# Patient Record
Sex: FEMALE
[De-identification: names, ages, dates, MRNs, and addresses within clinical notes are randomized per-mention and may not be internally consistent; named-entity substitution may affect disease eponyms.]

---

## 2017-07-21 ENCOUNTER — HOSPITAL (OUTPATIENT)
Dept: OTHER | Age: 52
End: 2017-07-21

## 2018-01-31 ENCOUNTER — HOSPITAL (OUTPATIENT)
Dept: OTHER | Age: 53
End: 2018-01-31
Attending: ANESTHESIOLOGY

## 2018-03-07 ENCOUNTER — HOSPITAL (OUTPATIENT)
Dept: OTHER | Age: 53
End: 2018-03-07
Attending: FAMILY MEDICINE

## 2018-04-03 ENCOUNTER — HOSPITAL (OUTPATIENT)
Dept: OTHER | Age: 53
End: 2018-04-03
Attending: EMERGENCY MEDICINE

## 2018-04-03 LAB
ALBUMIN SERPL-MCNC: 3.3 GM/DL (ref 3.6–5.1)
ALBUMIN/GLOB SERPL: 0.9 {RATIO} (ref 1–2.4)
ALP SERPL-CCNC: 119 UNIT/L (ref 45–117)
ALT SERPL-CCNC: 38 UNIT/L
ANALYZER ANC (IANC): NORMAL
ANION GAP SERPL CALC-SCNC: 8 MMOL/L (ref 10–20)
AST SERPL-CCNC: 29 UNIT/L
BASOPHILS # BLD: 0.1 THOUSAND/MCL (ref 0–0.3)
BASOPHILS NFR BLD: 1 %
BILIRUB SERPL-MCNC: 0.2 MG/DL (ref 0.2–1)
BUN SERPL-MCNC: 11 MG/DL (ref 6–20)
BUN/CREAT SERPL: 12 (ref 7–25)
CALCIUM SERPL-MCNC: 8.8 MG/DL (ref 8.4–10.2)
CHLORIDE: 103 MMOL/L (ref 98–107)
CO2 SERPL-SCNC: 33 MMOL/L (ref 21–32)
CREAT SERPL-MCNC: 0.91 MG/DL (ref 0.51–0.95)
DIFFERENTIAL METHOD BLD: NORMAL
EOSINOPHIL # BLD: 0.2 THOUSAND/MCL (ref 0.1–0.5)
EOSINOPHIL NFR BLD: 3 %
ERYTHROCYTE [DISTWIDTH] IN BLOOD: 12.4 % (ref 11–15)
GLOBULIN SER-MCNC: 3.8 GM/DL (ref 2–4)
GLUCOSE SERPL-MCNC: 103 MG/DL (ref 65–99)
HEMATOCRIT: 39.9 % (ref 36–46.5)
HGB BLD-MCNC: 13.7 GM/DL (ref 12–15.5)
LIPASE SERPL-CCNC: 141 UNIT/L (ref 73–393)
LYMPHOCYTES # BLD: 2.6 THOUSAND/MCL (ref 1–4)
LYMPHOCYTES NFR BLD: 40 %
MAGNESIUM SERPL-MCNC: 2.4 MG/DL (ref 1.7–2.4)
MCH RBC QN AUTO: 30.9 PG (ref 26–34)
MCHC RBC AUTO-ENTMCNC: 34.3 GM/DL (ref 32–36.5)
MCV RBC AUTO: 90.1 FL (ref 78–100)
MONOCYTES # BLD: 0.3 THOUSAND/MCL (ref 0.3–0.9)
MONOCYTES NFR BLD: 4 %
NEUTROPHILS # BLD: 3.5 THOUSAND/MCL (ref 1.8–7.7)
NEUTROPHILS NFR BLD: 52 %
NEUTS SEG NFR BLD: NORMAL %
PERCENT NRBC: NORMAL
PLATELET # BLD: 267 THOUSAND/MCL (ref 140–450)
POTASSIUM SERPL-SCNC: 3.1 MMOL/L (ref 3.4–5.1)
PROT SERPL-MCNC: 7.1 GM/DL (ref 6.4–8.2)
RBC # BLD: 4.43 MILLION/MCL (ref 4–5.2)
SODIUM SERPL-SCNC: 141 MMOL/L (ref 135–145)
WBC # BLD: 6.6 THOUSAND/MCL (ref 4.2–11)

## 2018-05-29 PROCEDURE — 81001 URINALYSIS AUTO W/SCOPE: CPT | Performed by: OBSTETRICS & GYNECOLOGY

## 2018-05-30 ENCOUNTER — HOSPITAL (OUTPATIENT)
Dept: OTHER | Age: 53
End: 2018-05-30
Attending: ANESTHESIOLOGY

## 2018-07-25 PROBLEM — E87.6 HYPOKALEMIA: Status: ACTIVE | Noted: 2018-07-25

## 2018-07-25 PROBLEM — N39.41 URGE INCONTINENCE: Status: ACTIVE | Noted: 2018-07-25

## 2018-07-25 PROBLEM — F33.40 RECURRENT MAJOR DEPRESSIVE DISORDER, IN REMISSION (HCC): Status: ACTIVE | Noted: 2018-07-25

## 2018-07-25 PROBLEM — G47.09 OTHER INSOMNIA: Status: ACTIVE | Noted: 2018-07-25

## 2018-07-25 PROBLEM — M79.7 FIBROMYALGIA: Status: ACTIVE | Noted: 2018-07-25

## 2018-08-13 PROBLEM — Z14.8 CARRIER OF GENE MUTATION FOR HIGH RISK OF CANCER: Status: ACTIVE | Noted: 2018-08-13

## 2018-08-20 PROCEDURE — 88305 TISSUE EXAM BY PATHOLOGIST: CPT | Performed by: RADIOLOGY

## 2018-10-01 ENCOUNTER — TELEPHONE (OUTPATIENT)
Dept: SURGERY | Facility: CLINIC | Age: 53
End: 2018-10-01

## 2018-10-01 NOTE — TELEPHONE ENCOUNTER
I spoke with the patient who called with concerns that she has a joint case scheduled on Monday, 10/15 with Dr. Marce Simons, and that she will be leaving the practice in December-  She would very much like to meet with Dr. Paula Villegas to see if he will be able

## 2018-10-05 ENCOUNTER — OFFICE VISIT (OUTPATIENT)
Dept: SURGERY | Facility: CLINIC | Age: 53
End: 2018-10-05
Payer: MEDICARE

## 2018-10-05 VITALS — HEIGHT: 62 IN | WEIGHT: 205.63 LBS | BODY MASS INDEX: 37.84 KG/M2

## 2018-10-05 DIAGNOSIS — Z14.8 CARRIER OF GENE MUTATION FOR HIGH RISK OF CANCER: Primary | ICD-10-CM

## 2018-10-05 PROCEDURE — 99203 OFFICE O/P NEW LOW 30 MIN: CPT | Performed by: SURGERY

## 2018-10-05 NOTE — CONSULTS
New Patient Consultation    This is the first visit for this 47year old female who presents to discuss reconstructive options following bilateral mastectomy. History of Present Illness:    The patient is a 47year old female who presents with a history Oral Tab TK 1 T PO Q 8 H   PRN FOR PAIN Disp:  Rfl: 2   Zolpidem Tartrate 10 MG Oral Tab TK 1 T PO QD HS Disp:  Rfl: 1         Allergies:      Succinylcholine         OTHER (SEE COMMENTS)    Comment:Malignant hypothermia  Anesthetics, Amide      UNKNOWN  C swelling. Breasts: The patient denies +breast masses, pain, change in the breast skin, skin dimpling, nipple discharge, or rash.     Gastrointestinal:  There is no history of difficulty or pain with swallowing, reflux symptoms, nausea, vomiting, dark/bl without palpable masses. The trachea is in the midline. Conjunctiva are clear, non-icteric. Moderate shoulder grooving is noted.     Breasts: General inspection of the breast shows the left breast to be somewhat larger than the right breast.    Right kortney Given the patient's redundant skin envelope, we discussed performing the mastectomy via a stage Wise pattern or full Wise pattern approach dependent on operative findings.   Representative illustrations and photographs this technique were demonstrated to th on flap-based reconstruction (should it be deemed necessary based on the final pathology results) including flap fibrosis, shrinkage, and fat necrosis.       Finally, we discussed the possible need for revisions as well as the process of nipple areolar sharath

## 2018-10-05 NOTE — PROGRESS NOTES
Surgery and wash instructions verbally reviewed with the patient and written instructions were also provided. The patient understands the need to obtain medical clearance for this procedure and plans to see her PCP for this.  Informed consent for the proced

## 2018-10-05 NOTE — PATIENT INSTRUCTIONS
Surgeon: Dr. Briceño Failing      Tel:  [phone]    Fax: [phone]     Surgery/Procedure: Immediate bilateral breast reconstruction with tissue expander placement and acellular dermal matrix, 2.5 hours, general anesthesia       Hospital:  [facility]

## 2018-10-09 ENCOUNTER — TELEPHONE (OUTPATIENT)
Dept: SURGERY | Facility: CLINIC | Age: 53
End: 2018-10-09

## 2018-10-09 DIAGNOSIS — Z14.8 CARRIER OF GENE MUTATION FOR HIGH RISK OF CANCER: Primary | ICD-10-CM

## 2018-10-09 PROBLEM — Z15.01 BARD1 GENE MUTATION POSITIVE: Status: ACTIVE | Noted: 2018-10-09

## 2018-10-09 PROBLEM — R94.31 ABNORMAL EKG: Status: ACTIVE | Noted: 2018-10-09

## 2018-10-09 PROBLEM — R94.31 QT PROLONGATION: Status: ACTIVE | Noted: 2018-10-09

## 2018-10-09 PROBLEM — I10 ESSENTIAL HYPERTENSION: Status: ACTIVE | Noted: 2018-10-09

## 2018-10-09 RX ORDER — OMEPRAZOLE 10 MG/1
15 CAPSULE, DELAYED RELEASE ORAL 2 TIMES DAILY
Status: ON HOLD | COMMUNITY
End: 2018-10-15

## 2018-10-09 NOTE — TELEPHONE ENCOUNTER
I called Susan Braxton at Dr. Merino Minium office to let her know that I added Dr. Edgardo Blakely reconstruction portion to this case on Monday, 10/15-  She confirmed that the case will indeed begin at 200 as originally requested and not 1110 as currently indicated in

## 2018-10-12 ENCOUNTER — TELEPHONE (OUTPATIENT)
Dept: SURGERY | Facility: CLINIC | Age: 53
End: 2018-10-12

## 2018-10-12 ENCOUNTER — ANESTHESIA EVENT (OUTPATIENT)
Dept: SURGERY | Facility: HOSPITAL | Age: 53
End: 2018-10-12
Payer: MEDICARE

## 2018-10-12 NOTE — TELEPHONE ENCOUNTER
I called Dr. Dunia Jolley office and was only able to leave a voicemail for Richar Bryson-  I would like to discuss this patient's joint procedure on Monday of next week and also that the patient is scheduled for a cardiac cath procedure today at 1500-  I

## 2018-10-12 NOTE — TELEPHONE ENCOUNTER
I spoke with Re Rocha at Dr. Danny Gray office regarding this patient having a cardiac cath procedure this afternoon with subsequent breast surgery scheduled for Monday-  She reports that the cardiologist is not currently making any recommendations to change the cuevas

## 2018-10-12 NOTE — PAT NURSING NOTE
Telephoned Dr. Llanes Saliva office and spoke with Kendall Cahcon and informed her that the patient is having a cardiac cath procedure today at 1500. Unsure if results will be available prior to PAT closing. Requested that their office follow up with patient.

## 2018-10-15 ENCOUNTER — ANESTHESIA (OUTPATIENT)
Dept: SURGERY | Facility: HOSPITAL | Age: 53
End: 2018-10-15
Payer: MEDICARE

## 2018-10-15 ENCOUNTER — HOSPITAL ENCOUNTER (OUTPATIENT)
Facility: HOSPITAL | Age: 53
Discharge: HOME | End: 2018-10-16
Attending: SURGERY | Admitting: SURGERY
Payer: MEDICARE

## 2018-10-15 DIAGNOSIS — Z80.3 FAMILY HISTORY OF BREAST CANCER: ICD-10-CM

## 2018-10-15 DIAGNOSIS — Z15.01 BRCA GENE POSITIVE: ICD-10-CM

## 2018-10-15 DIAGNOSIS — Z15.09 BRCA GENE POSITIVE: ICD-10-CM

## 2018-10-15 PROCEDURE — 0HTV0ZZ RESECTION OF BILATERAL BREAST, OPEN APPROACH: ICD-10-PCS | Performed by: SURGERY

## 2018-10-15 PROCEDURE — 80051 ELECTROLYTE PANEL: CPT

## 2018-10-15 PROCEDURE — 88305 TISSUE EXAM BY PATHOLOGIST: CPT | Performed by: SURGERY

## 2018-10-15 PROCEDURE — 88307 TISSUE EXAM BY PATHOLOGIST: CPT | Performed by: SURGERY

## 2018-10-15 PROCEDURE — 0HHV0NZ INSERTION OF TISSUE EXPANDER INTO BILATERAL BREAST, OPEN APPROACH: ICD-10-PCS | Performed by: SURGERY

## 2018-10-15 DEVICE — IMPLANT TISSUE EXP 133MX-15-T: Type: IMPLANTABLE DEVICE | Site: BREAST | Status: NON-FUNCTIONAL

## 2018-10-15 DEVICE — GRAFT ALLODERM CONTOUR LG PERF: Type: IMPLANTABLE DEVICE | Site: BREAST | Status: FUNCTIONAL

## 2018-10-15 RX ORDER — METOCLOPRAMIDE 10 MG/1
10 TABLET ORAL EVERY 6 HOURS PRN
Status: DISCONTINUED | OUTPATIENT
Start: 2018-10-15 | End: 2018-10-16

## 2018-10-15 RX ORDER — OMEPRAZOLE 20 MG/1
20 CAPSULE, DELAYED RELEASE ORAL NIGHTLY
Status: ON HOLD | COMMUNITY
End: 2020-08-30

## 2018-10-15 RX ORDER — MORPHINE SULFATE 4 MG/ML
2 INJECTION, SOLUTION INTRAMUSCULAR; INTRAVENOUS EVERY 5 MIN PRN
Status: DISCONTINUED | OUTPATIENT
Start: 2018-10-15 | End: 2018-10-15 | Stop reason: HOSPADM

## 2018-10-15 RX ORDER — NALOXONE HYDROCHLORIDE 0.4 MG/ML
80 INJECTION, SOLUTION INTRAMUSCULAR; INTRAVENOUS; SUBCUTANEOUS AS NEEDED
Status: DISCONTINUED | OUTPATIENT
Start: 2018-10-15 | End: 2018-10-15 | Stop reason: HOSPADM

## 2018-10-15 RX ORDER — OXYBUTYNIN CHLORIDE 5 MG/1
5 TABLET ORAL 2 TIMES DAILY
Status: DISCONTINUED | OUTPATIENT
Start: 2018-10-15 | End: 2018-10-16

## 2018-10-15 RX ORDER — TRAMADOL HYDROCHLORIDE 50 MG/1
50 TABLET ORAL EVERY 8 HOURS PRN
Status: ON HOLD | COMMUNITY
End: 2018-10-16

## 2018-10-15 RX ORDER — MIDAZOLAM HYDROCHLORIDE 1 MG/ML
1 INJECTION INTRAMUSCULAR; INTRAVENOUS EVERY 5 MIN PRN
Status: DISCONTINUED | OUTPATIENT
Start: 2018-10-15 | End: 2018-10-15 | Stop reason: HOSPADM

## 2018-10-15 RX ORDER — ONDANSETRON 2 MG/ML
4 INJECTION INTRAMUSCULAR; INTRAVENOUS AS NEEDED
Status: DISCONTINUED | OUTPATIENT
Start: 2018-10-15 | End: 2018-10-15 | Stop reason: HOSPADM

## 2018-10-15 RX ORDER — LOSARTAN POTASSIUM AND HYDROCHLOROTHIAZIDE 12.5; 5 MG/1; MG/1
1 TABLET ORAL DAILY
COMMUNITY
End: 2019-04-30

## 2018-10-15 RX ORDER — PANTOPRAZOLE SODIUM 20 MG/1
20 TABLET, DELAYED RELEASE ORAL
Status: DISCONTINUED | OUTPATIENT
Start: 2018-10-16 | End: 2018-10-16

## 2018-10-15 RX ORDER — SODIUM CHLORIDE, SODIUM LACTATE, POTASSIUM CHLORIDE, CALCIUM CHLORIDE 600; 310; 30; 20 MG/100ML; MG/100ML; MG/100ML; MG/100ML
INJECTION, SOLUTION INTRAVENOUS CONTINUOUS
Status: DISCONTINUED | OUTPATIENT
Start: 2018-10-15 | End: 2018-10-16

## 2018-10-15 RX ORDER — HYDROCODONE BITARTRATE AND ACETAMINOPHEN 5; 325 MG/1; MG/1
2 TABLET ORAL AS NEEDED
Status: DISCONTINUED | OUTPATIENT
Start: 2018-10-15 | End: 2018-10-15 | Stop reason: HOSPADM

## 2018-10-15 RX ORDER — DIPHENHYDRAMINE HYDROCHLORIDE 50 MG/ML
12.5 INJECTION INTRAMUSCULAR; INTRAVENOUS AS NEEDED
Status: DISCONTINUED | OUTPATIENT
Start: 2018-10-15 | End: 2018-10-15 | Stop reason: HOSPADM

## 2018-10-15 RX ORDER — POTASSIUM CHLORIDE 750 MG/1
10 TABLET, EXTENDED RELEASE ORAL NIGHTLY
COMMUNITY
End: 2019-10-15

## 2018-10-15 RX ORDER — ESCITALOPRAM OXALATE 10 MG/1
10 TABLET ORAL DAILY
Status: DISCONTINUED | OUTPATIENT
Start: 2018-10-15 | End: 2018-10-16

## 2018-10-15 RX ORDER — ONDANSETRON 2 MG/ML
4 INJECTION INTRAMUSCULAR; INTRAVENOUS EVERY 6 HOURS PRN
Status: DISCONTINUED | OUTPATIENT
Start: 2018-10-15 | End: 2018-10-16

## 2018-10-15 RX ORDER — CEFAZOLIN SODIUM 1 G/3ML
INJECTION, POWDER, FOR SOLUTION INTRAMUSCULAR; INTRAVENOUS
Status: DISCONTINUED | OUTPATIENT
Start: 2018-10-15 | End: 2018-10-15 | Stop reason: HOSPADM

## 2018-10-15 RX ORDER — HYDROCODONE BITARTRATE AND ACETAMINOPHEN 5; 325 MG/1; MG/1
1-2 TABLET ORAL EVERY 6 HOURS PRN
Status: DISCONTINUED | OUTPATIENT
Start: 2018-10-15 | End: 2018-10-16

## 2018-10-15 RX ORDER — CEFAZOLIN SODIUM/WATER 2 G/20 ML
2 SYRINGE (ML) INTRAVENOUS EVERY 8 HOURS
Status: DISCONTINUED | OUTPATIENT
Start: 2018-10-16 | End: 2018-10-16

## 2018-10-15 RX ORDER — SODIUM CHLORIDE, SODIUM LACTATE, POTASSIUM CHLORIDE, CALCIUM CHLORIDE 600; 310; 30; 20 MG/100ML; MG/100ML; MG/100ML; MG/100ML
INJECTION, SOLUTION INTRAVENOUS CONTINUOUS
Status: DISCONTINUED | OUTPATIENT
Start: 2018-10-15 | End: 2018-10-15 | Stop reason: HOSPADM

## 2018-10-15 RX ORDER — IBUPROFEN 600 MG/1
600 TABLET ORAL EVERY 6 HOURS PRN
Status: DISCONTINUED | OUTPATIENT
Start: 2018-10-15 | End: 2018-10-16

## 2018-10-15 RX ORDER — ONDANSETRON 4 MG/1
4 TABLET, ORALLY DISINTEGRATING ORAL EVERY 6 HOURS PRN
Status: DISCONTINUED | OUTPATIENT
Start: 2018-10-15 | End: 2018-10-16

## 2018-10-15 RX ORDER — ENOXAPARIN SODIUM 100 MG/ML
40 INJECTION SUBCUTANEOUS DAILY
Status: DISCONTINUED | OUTPATIENT
Start: 2018-10-16 | End: 2018-10-16

## 2018-10-15 RX ORDER — HYDROCODONE BITARTRATE AND ACETAMINOPHEN 5; 325 MG/1; MG/1
1 TABLET ORAL AS NEEDED
Status: DISCONTINUED | OUTPATIENT
Start: 2018-10-15 | End: 2018-10-15 | Stop reason: HOSPADM

## 2018-10-15 RX ORDER — ZOLPIDEM TARTRATE 10 MG/1
12.5 TABLET ORAL NIGHTLY
COMMUNITY
End: 2021-08-23 | Stop reason: ALTCHOICE

## 2018-10-15 RX ORDER — DOCUSATE SODIUM 100 MG/1
100 CAPSULE, LIQUID FILLED ORAL 2 TIMES DAILY
Status: DISCONTINUED | OUTPATIENT
Start: 2018-10-16 | End: 2018-10-16

## 2018-10-15 RX ORDER — METOCLOPRAMIDE HYDROCHLORIDE 5 MG/ML
10 INJECTION INTRAMUSCULAR; INTRAVENOUS AS NEEDED
Status: DISCONTINUED | OUTPATIENT
Start: 2018-10-15 | End: 2018-10-15 | Stop reason: HOSPADM

## 2018-10-15 RX ORDER — METOCLOPRAMIDE HYDROCHLORIDE 5 MG/ML
10 INJECTION INTRAMUSCULAR; INTRAVENOUS EVERY 6 HOURS PRN
Status: DISCONTINUED | OUTPATIENT
Start: 2018-10-15 | End: 2018-10-16

## 2018-10-15 RX ORDER — GABAPENTIN 300 MG/1
600 CAPSULE ORAL 2 TIMES DAILY
Status: DISCONTINUED | OUTPATIENT
Start: 2018-10-15 | End: 2018-10-16

## 2018-10-15 RX ORDER — CEFAZOLIN SODIUM/WATER 2 G/20 ML
2 SYRINGE (ML) INTRAVENOUS ONCE
Status: COMPLETED | OUTPATIENT
Start: 2018-10-15 | End: 2018-10-15

## 2018-10-15 RX ORDER — DIPHENHYDRAMINE HYDROCHLORIDE 50 MG/ML
25 INJECTION INTRAMUSCULAR; INTRAVENOUS EVERY 4 HOURS PRN
Status: DISCONTINUED | OUTPATIENT
Start: 2018-10-15 | End: 2018-10-16

## 2018-10-15 RX ORDER — ACETAMINOPHEN 500 MG
1000 TABLET ORAL ONCE
Status: DISCONTINUED | OUTPATIENT
Start: 2018-10-15 | End: 2018-10-15 | Stop reason: HOSPADM

## 2018-10-15 NOTE — PROGRESS NOTES
Plan for bilateral skin-sparing mastectomy by Dr. Carmen Marrero and immediate reconstruction with tissue expander and acellular dermal matrix reviewed with patient and family.  The risks of surgery including but not limited to bleeding, infection, scarring, delayed w

## 2018-10-15 NOTE — ANESTHESIA POSTPROCEDURE EVALUATION
[facility]    [facility] Patient Status:  Outpatient in a Bed   Age/Gender 47year old female MRN [id number]   [facility] SURGERY Attending Cam Hough MD   Hosp Day # 0 PCP Dora Fair MD       Anesthesia Post-op Note    Proc

## 2018-10-15 NOTE — BRIEF OP NOTE
Pre-Operative Diagnosis: BRCA gene positive [Z15.01, Z15.09]  Family history of breast cancer [Z80.3]     Post-Operative Diagnosis: BRCA gene positive [Z15.01, Z15.09]Family history of breast cancer [Z80.3]      Procedure Performed:   BILATERAL SIMPLE MAST

## 2018-10-15 NOTE — ANESTHESIA PREPROCEDURE EVALUATION
PRE-OP EVALUATION    Patient Name: Kenzie Pinedo    Pre-op Diagnosis: BRCA gene positive [Z15.01, Z15.09]  Family history of breast cancer [Z80.3]    Procedure(s):  BILATERAL SIMPLE MASTECTOMY WITH NIPPLE SPARING (PATRICIA MARIN)  Immediate bilateral breast reconstr Pulmonary               (-) shortness of breath     (+) sleep apnea       Neuro/Psych  Comment: Fibromyalgia     (+) depression                         Anxiety    • Arthritis    • Back problem    • Depression    • Esophageal reflux Other findings            ASA: 2   Plan: general  NPO status verified and patient meets guidelines. Post-procedure pain management plan discussed with surgeon and patient.       Plan/risks discussed with: patient, mother and sibling            Pt with

## 2018-10-15 NOTE — HISTORICAL OFFICE NOTE
The above referenced H&P in the office on 9/28/2018 was reviewed by Araceli Hernandez MD on 10/15/2018, the patient was examined and no significant changes have occurred in the patient's condition since the H&P was performed.   Risks and benefits were discusse

## 2018-10-16 VITALS
TEMPERATURE: 98 F | HEART RATE: 87 BPM | HEIGHT: 62 IN | BODY MASS INDEX: 36.6 KG/M2 | RESPIRATION RATE: 18 BRPM | OXYGEN SATURATION: 94 % | WEIGHT: 198.88 LBS | DIASTOLIC BLOOD PRESSURE: 54 MMHG | SYSTOLIC BLOOD PRESSURE: 91 MMHG

## 2018-10-16 PROCEDURE — 85025 COMPLETE CBC W/AUTO DIFF WBC: CPT | Performed by: HOSPITALIST

## 2018-10-16 RX ORDER — DOCUSATE SODIUM 100 MG/1
100 CAPSULE, LIQUID FILLED ORAL 2 TIMES DAILY
Qty: 40 CAPSULE | Refills: 0 | Status: SHIPPED | OUTPATIENT
Start: 2018-10-16 | End: 2018-12-21 | Stop reason: ALTCHOICE

## 2018-10-16 RX ORDER — METOCLOPRAMIDE 10 MG/1
10 TABLET ORAL 3 TIMES DAILY PRN
Qty: 20 TABLET | Refills: 1 | Status: SHIPPED | OUTPATIENT
Start: 2018-10-16 | End: 2019-04-30

## 2018-10-16 RX ORDER — CEPHALEXIN 500 MG/1
500 CAPSULE ORAL 4 TIMES DAILY
Qty: 28 CAPSULE | Refills: 2 | Status: SHIPPED | OUTPATIENT
Start: 2018-10-16 | End: 2018-12-21 | Stop reason: ALTCHOICE

## 2018-10-16 RX ORDER — CEFADROXIL 500 MG/1
500 CAPSULE ORAL 2 TIMES DAILY
Qty: 28 CAPSULE | Refills: 0 | Status: SHIPPED | OUTPATIENT
Start: 2018-10-16 | End: 2018-10-30

## 2018-10-16 RX ORDER — HYDROCODONE BITARTRATE AND ACETAMINOPHEN 5; 325 MG/1; MG/1
1-2 TABLET ORAL EVERY 6 HOURS PRN
Qty: 30 TABLET | Refills: 0 | Status: SHIPPED | OUTPATIENT
Start: 2018-10-16 | End: 2018-12-21 | Stop reason: ALTCHOICE

## 2018-10-16 NOTE — PLAN OF CARE
PAIN - ADULT    • Verbalizes/displays adequate comfort level or patient's stated pain goal Progressing        Patient/Family Goals    • Patient/Family Long Term Goal Progressing    • Patient/Family Short Term Goal Progressing            Pt is A&O, slightly

## 2018-10-16 NOTE — OPERATIVE REPORT
[facility]    PATIENT'S NAME: Lazarus Odonnell   ATTENDING PHYSICIAN: Jose Gonzales M.D. OPERATING PHYSICIAN: Jose Gonzales M.D.    PATIENT ACCOUNT#:   [de-identified]    LOCATION:  [address] [place] [address]  MEDICAL RECORD #:   [id number]       YOB: 1965 subcutaneous tissue, and then the flap was created all the way up to the clavicle superiorly, medially to the sternum, laterally to the latissimus dorsi muscle, and inferiorly to the inframammary ridge, and then the breast was removed.   The fatty tissue wi

## 2018-10-16 NOTE — PROGRESS NOTES
[facility]  Progress Note    Wanna Bloch Patient Status:  Outpatient in a Bed    10/18/1965 MRN [id number]   [facility] 3NW-A Attending Va Curran MD   Hosp Day # 0 PCP Giovanny Patel MD     Subjective:    Patient tolerating P

## 2018-10-16 NOTE — OPERATIVE REPORT
[address]    PATIENT'S NAME: Scott Elder   ATTENDING PHYSICIAN: Hines Lefort, M.D. OPERATING PHYSICIAN: Reg Mcguire M.D.    PATIENT ACCOUNT#:   [de-identified]    LOCATION:  [address] [place] [address]  MEDICAL RECORD #:   [id number]       DATE OF BIRTH:  [birth year]/ periareolar incisions. The patient was then taken to the operating room by Dr. Robyn Ramon team where she underwent bilateral skin-sparing mastectomies.   Once bilateral mastectomy was completed, I entered the room and the plastic surgery portion of the procedur Vicryl sutures. The remaining two in the AlloDerm were secured to the pectoralis muscle medially and superiorly. Laterally, it was secured to the serratus fascia.   Two 15-Ghanaian Fahad drains were exited through lateral stab incisions and sutured to the s hence, the expander was accessed and 120 mL of air were evacuated to a total of 480 mL. Biopatch and Tegaderm were placed on the drain sites. Bacitracin, Xeroform, fluff gauze, surgical bra were applied.   The patient was awakened, extubated, taken to the

## 2018-10-16 NOTE — CONSULTS
[facility] Hospitalist Consult Note     CC:  Medical management sp mastectomy    PCP: Tona Deshpande MD      History of Present Illness: Patient is a 47year old female with PMH sig for HTN, depression/anxiety/fibtromylagia, presents sp mastectomy.        SHe (100 mg total) by mouth 2 (two) times daily.        Social History    Tobacco Use      Smoking status: Never Smoker      Smokeless tobacco: Never Used    Alcohol use: No      Alcohol/week: 0.0 oz      Frequency: Never       Fam Hx  Family History   Problem turgor normal. No rashes or lesions.     Neurologic: Normal strength, no focal deficit appreciated     Data Review:    LABS:   Lab Results   Component Value Date    WBC 11.7 10/16/2018    HGB 11.0 10/16/2018    HCT 33.2 10/16/2018    .0 10/16/2018

## 2018-10-19 ENCOUNTER — OFFICE VISIT (OUTPATIENT)
Dept: SURGERY | Facility: CLINIC | Age: 53
End: 2018-10-19
Payer: MEDICARE

## 2018-10-19 VITALS
SYSTOLIC BLOOD PRESSURE: 99 MMHG | TEMPERATURE: 100 F | DIASTOLIC BLOOD PRESSURE: 63 MMHG | OXYGEN SATURATION: 99 % | HEART RATE: 88 BPM

## 2018-10-19 DIAGNOSIS — Z90.13 ABSENCE OF BREAST, ACQUIRED, BILATERAL: Primary | ICD-10-CM

## 2018-10-19 PROCEDURE — 99024 POSTOP FOLLOW-UP VISIT: CPT | Performed by: SURGERY

## 2018-10-19 NOTE — PROGRESS NOTES
Jaquan Matos is a 46year old female who presents today for a follow-up. She denies fever and chills. She denies nausea, vomiting, diarrhea or constipation.        Physical Examination:   10/19/18  1412   BP: 99/63   Pulse: 88   Temp: 99.6 °F (37.6 °C)   T

## 2018-10-26 ENCOUNTER — OFFICE VISIT (OUTPATIENT)
Dept: SURGERY | Facility: CLINIC | Age: 53
End: 2018-10-26
Payer: MEDICARE

## 2018-10-26 DIAGNOSIS — Z90.13 ABSENCE OF BREAST, ACQUIRED, BILATERAL: ICD-10-CM

## 2018-10-26 DIAGNOSIS — Z90.13 ACQUIRED ABSENCE OF BILATERAL BREASTS AND NIPPLES: Primary | ICD-10-CM

## 2018-10-26 PROCEDURE — 99024 POSTOP FOLLOW-UP VISIT: CPT | Performed by: PHYSICIAN ASSISTANT

## 2018-10-26 RX ORDER — HYDROCODONE BITARTRATE AND ACETAMINOPHEN 5; 325 MG/1; MG/1
1-2 TABLET ORAL EVERY 4 HOURS PRN
Qty: 30 TABLET | Refills: 0 | Status: SHIPPED | OUTPATIENT
Start: 2018-10-26 | End: 2019-06-27

## 2018-10-26 NOTE — PROGRESS NOTES
His is a 47year old female that is POD #11 S/P her bilateral mastectomy (Dr. Vita Leon) and immediate reconstruction with tissue expanders, pre-pec placement, with ADM and 480cc intra-op air fill.  She had a genetic predisposition for breast cancer and chose t will see us back next week for drain evaluation and mastectomy skin evaluation. I did educate her on the signs of hematoma and she understands to call immediately if she were to experience these.  I also encouraged her to contact Dr. Lidya Klein office for a pos

## 2018-10-29 ENCOUNTER — SOCIAL WORK SERVICES (OUTPATIENT)
Dept: HEMATOLOGY/ONCOLOGY | Facility: HOSPITAL | Age: 53
End: 2018-10-29

## 2018-10-29 ENCOUNTER — MEDICAL CORRESPONDENCE (OUTPATIENT)
Dept: SURGERY | Facility: CLINIC | Age: 53
End: 2018-10-29

## 2018-10-29 NOTE — PROGRESS NOTES
[facility] for patient's son Isadora Bowman and faxed to [address] [place] [address], [phone]; copy emailed to Sai Izquierdo. She will hold for patient to  at Dr. Linden Tejeda office, [address] [address]30 in [facility] today.

## 2018-10-29 NOTE — PROGRESS NOTES
Evaluation of Drain output, amounts reflect 24 hour recording period:                    10/26      10/27      10/28        10/29    Drain #1    50cc        65cc          60cc        30cc-am

## 2018-11-02 ENCOUNTER — OFFICE VISIT (OUTPATIENT)
Dept: SURGERY | Facility: CLINIC | Age: 53
End: 2018-11-02
Payer: MEDICARE

## 2018-11-02 DIAGNOSIS — Z79.2 PROPHYLACTIC ANTIBIOTIC: ICD-10-CM

## 2018-11-02 DIAGNOSIS — L90.5 SCAR OF BREAST: Primary | ICD-10-CM

## 2018-11-02 DIAGNOSIS — Z90.13 ABSENCE OF BREAST, ACQUIRED, BILATERAL: ICD-10-CM

## 2018-11-02 PROCEDURE — 99024 POSTOP FOLLOW-UP VISIT: CPT | Performed by: SURGERY

## 2018-11-02 PROCEDURE — 88305 TISSUE EXAM BY PATHOLOGIST: CPT | Performed by: SURGERY

## 2018-11-02 RX ORDER — CEPHALEXIN 500 MG/1
500 CAPSULE ORAL 4 TIMES DAILY
Qty: 28 CAPSULE | Refills: 1 | Status: SHIPPED | OUTPATIENT
Start: 2018-11-02 | End: 2018-11-06

## 2018-11-02 NOTE — PROCEDURES
Debridement of left mastectomy skin flap necrosis. Informed consent was obtained to the patient. The risk, benefits, and alternatives were reviewed. The patient expressed understanding wish to proceed. The area was prepped and draped sterilely.   The cuevas

## 2018-11-02 NOTE — PROGRESS NOTES
Yue Cervantes is a 46year old female who presents today for a follow-up. She denies fever and chills and remains on oral antibiotic prophylaxis. Physical Examination:  Breasts: Breast incisions are noted to have areas of healed epidermal lysis.   The le

## 2018-11-02 NOTE — PROGRESS NOTES
A prescription for oral Keflex electronically sent to the patient's preferred pharmacy per Dr. Chavez Brooks-

## 2018-11-06 ENCOUNTER — OFFICE VISIT (OUTPATIENT)
Dept: SURGERY | Facility: CLINIC | Age: 53
End: 2018-11-06
Payer: MEDICARE

## 2018-11-06 DIAGNOSIS — Z90.13 ABSENCE OF BREAST, ACQUIRED, BILATERAL: Primary | ICD-10-CM

## 2018-11-06 PROCEDURE — 99024 POSTOP FOLLOW-UP VISIT: CPT | Performed by: SURGERY

## 2018-11-06 NOTE — PROGRESS NOTES
Maranda Daniels is a 46year old female who presents today for a follow-up. She denies fever and chills. She denies nausea, vomiting, diarrhea or constipation. Physical Examination:  Breasts: Bilateral breast incisions are clean dry and intact.   Drain e

## 2018-11-09 ENCOUNTER — OFFICE VISIT (OUTPATIENT)
Dept: SURGERY | Facility: CLINIC | Age: 53
End: 2018-11-09
Payer: MEDICARE

## 2018-11-09 DIAGNOSIS — Z90.13 ABSENCE OF BREAST, ACQUIRED, BILATERAL: Primary | ICD-10-CM

## 2018-11-09 PROCEDURE — 99024 POSTOP FOLLOW-UP VISIT: CPT | Performed by: SURGERY

## 2018-11-09 NOTE — PROGRESS NOTES
Raya Nava is a 46year old female who presents today for a follow-up. She denies fever and chills. She denies nausea, vomiting, diarrhea or constipation. Physical Examination:  Breasts: Bilateral breast incisions are clean dry and intact.   There is

## 2018-11-13 ENCOUNTER — OFFICE VISIT (OUTPATIENT)
Dept: SURGERY | Facility: CLINIC | Age: 53
End: 2018-11-13
Payer: MEDICARE

## 2018-11-13 DIAGNOSIS — Z90.13 ABSENCE OF BREAST, ACQUIRED, BILATERAL: Primary | ICD-10-CM

## 2018-11-13 PROCEDURE — 99024 POSTOP FOLLOW-UP VISIT: CPT | Performed by: SURGERY

## 2018-11-13 NOTE — PROGRESS NOTES
Alana Calix is a 46year old female who presents today for a follow-up. She denies fever and chills. She denies nausea, vomiting, diarrhea or constipation. Physical Examination:  Breasts: Bilateral breast incisions are clean dry and intact.   A palp

## 2018-11-23 ENCOUNTER — OFFICE VISIT (OUTPATIENT)
Dept: SURGERY | Facility: CLINIC | Age: 53
End: 2018-11-23
Payer: MEDICARE

## 2018-11-23 DIAGNOSIS — Z90.13 ABSENCE OF BREAST, ACQUIRED, BILATERAL: Primary | ICD-10-CM

## 2018-11-23 PROCEDURE — 99024 POSTOP FOLLOW-UP VISIT: CPT | Performed by: PHYSICIAN ASSISTANT

## 2018-11-23 NOTE — PROGRESS NOTES
This is a 47year old female that is 5.5 weeks S/P her bilateral mastectomy (Dr. Yung Chi) and immediate reconstruction with tissue expanders, pre-pec placement, with ADM and 480cc intra-op air fill.  She had an in office left mastectomy skin flap necrosis alessandro whether or not she wants to undergo the long flap based procedure and longer recovery time. She is going to continue to think about her options and will follow up with us in 1-2 weeks for an additional fill.

## 2018-12-11 ENCOUNTER — OFFICE VISIT (OUTPATIENT)
Dept: SURGERY | Facility: CLINIC | Age: 53
End: 2018-12-11
Payer: MEDICARE

## 2018-12-11 DIAGNOSIS — Z14.8 GENETIC CARRIER STATUS: Primary | ICD-10-CM

## 2018-12-11 DIAGNOSIS — Z90.13 ABSENCE OF BREAST, ACQUIRED, BILATERAL: ICD-10-CM

## 2018-12-11 DIAGNOSIS — Z90.10 ACQUIRED ABSENCE OF BREAST, UNSPECIFIED LATERALITY: ICD-10-CM

## 2018-12-11 PROCEDURE — 99024 POSTOP FOLLOW-UP VISIT: CPT | Performed by: SURGERY

## 2018-12-11 NOTE — PROGRESS NOTES
Alana Calix is a 46year old female who presents today for a follow-up. She denies fever and chills. She denies nausea, vomiting, diarrhea or constipation. Physical Examination:  Breasts: Bilateral breast incisions are clean dry and intact.   Procedur

## 2018-12-12 ENCOUNTER — TELEPHONE (OUTPATIENT)
Dept: SURGERY | Facility: CLINIC | Age: 53
End: 2018-12-12

## 2018-12-17 DIAGNOSIS — Z90.10 ABSENCE OF BREAST, UNSPECIFIED LATERALITY: Primary | ICD-10-CM

## 2018-12-21 ENCOUNTER — OFFICE VISIT (OUTPATIENT)
Dept: SURGERY | Facility: CLINIC | Age: 53
End: 2018-12-21
Payer: MEDICARE

## 2018-12-21 VITALS — RESPIRATION RATE: 18 BRPM | SYSTOLIC BLOOD PRESSURE: 135 MMHG | DIASTOLIC BLOOD PRESSURE: 93 MMHG

## 2018-12-21 DIAGNOSIS — Z90.13 ABSENCE OF BREAST, ACQUIRED, BILATERAL: Primary | ICD-10-CM

## 2018-12-21 PROCEDURE — 99024 POSTOP FOLLOW-UP VISIT: CPT | Performed by: PHYSICIAN ASSISTANT

## 2018-12-21 RX ORDER — POTASSIUM CHLORIDE 750 MG/1
TABLET, FILM COATED, EXTENDED RELEASE ORAL
Refills: 2 | COMMUNITY
Start: 2018-11-09 | End: 2019-02-07

## 2018-12-21 NOTE — PROGRESS NOTES
This is a 47year old female that is 7 weeks S/P her bilateral mastectomy (Dr. Nadir Jennings) and immediate reconstruction with tissue expanders, pre-pec placement, with ADM and 480cc intra-op air fill.  She had an in office left mastectomy skin flap necrosis debri

## 2019-01-08 ENCOUNTER — OFFICE VISIT (OUTPATIENT)
Dept: SURGERY | Facility: CLINIC | Age: 54
End: 2019-01-08
Payer: MEDICARE

## 2019-01-08 DIAGNOSIS — Z14.8 CARRIER OF GENE MUTATION FOR HIGH RISK OF CANCER: ICD-10-CM

## 2019-01-08 DIAGNOSIS — Z90.13 ABSENCE OF BREAST, ACQUIRED, BILATERAL: Primary | ICD-10-CM

## 2019-01-08 PROCEDURE — 99024 POSTOP FOLLOW-UP VISIT: CPT | Performed by: SURGERY

## 2019-01-08 NOTE — PROGRESS NOTES
Teddy Garay is a 46year old female who presents today for a follow-up. She denies fever and chills. Patient is decided that she would like to proceed with flap based reconstruction ultimately.       Physical Examination:  Breasts: Bilateral breast incis

## 2019-01-11 ENCOUNTER — HOSPITAL ENCOUNTER (OUTPATIENT)
Dept: CT IMAGING | Facility: HOSPITAL | Age: 54
Discharge: HOME | End: 2019-01-11
Attending: SURGERY
Payer: MEDICARE

## 2019-01-11 DIAGNOSIS — Z14.8 CARRIER OF GENE MUTATION FOR HIGH RISK OF CANCER: ICD-10-CM

## 2019-01-11 DIAGNOSIS — Z90.13 ABSENCE OF BREAST, ACQUIRED, BILATERAL: ICD-10-CM

## 2019-01-11 LAB — CREAT SERPL-MCNC: 0.9 MG/DL (ref 0.55–1.02)

## 2019-01-11 PROCEDURE — 74174 CTA ABD&PLVS W/CONTRAST: CPT | Performed by: SURGERY

## 2019-01-11 PROCEDURE — 82565 ASSAY OF CREATININE: CPT

## 2019-01-15 ENCOUNTER — TELEPHONE (OUTPATIENT)
Dept: SURGERY | Facility: CLINIC | Age: 54
End: 2019-01-15

## 2019-01-15 NOTE — TELEPHONE ENCOUNTER
I spoke with the patient who called in regards to the results of her CTA abdomen and pelvis-   She will await for the results to be released to [email or web address].    I explained that the testing is used for blood vessel mapping and she may not necessarily be notified

## 2019-01-29 ENCOUNTER — OFFICE VISIT (OUTPATIENT)
Dept: SURGERY | Facility: CLINIC | Age: 54
End: 2019-01-29
Payer: MEDICARE

## 2019-01-29 DIAGNOSIS — Z90.13 ABSENCE OF BREAST, ACQUIRED, BILATERAL: Primary | ICD-10-CM

## 2019-01-29 PROCEDURE — 99212 OFFICE O/P EST SF 10 MIN: CPT | Performed by: SURGERY

## 2019-01-29 NOTE — PROGRESS NOTES
Leonarda Maynard is a 46year old female who presents today for a follow-up. Since her last visit, the patient underwent CT angiogram which showed suitable abdominal perforators. Physical Examination:  Breasts: BilThere is no erythema or seroma noted.   A

## 2019-01-31 ENCOUNTER — TELEPHONE (OUTPATIENT)
Dept: SURGERY | Facility: CLINIC | Age: 54
End: 2019-01-31

## 2019-01-31 NOTE — TELEPHONE ENCOUNTER
S/w pt to hold a date for a procedure with Juan Diaz. Patient would like to hold a date in May. Holding 5/20. Patient aware that this date is tentative.

## 2019-02-07 PROBLEM — E55.9 VITAMIN D DEFICIENCY: Status: ACTIVE | Noted: 2019-02-07

## 2019-02-07 PROBLEM — M17.10 OA (OSTEOARTHRITIS) OF KNEE: Status: ACTIVE | Noted: 2019-02-07

## 2019-02-07 PROBLEM — M17.9 OA (OSTEOARTHRITIS) OF KNEE: Status: ACTIVE | Noted: 2019-02-07

## 2019-04-30 ENCOUNTER — OFFICE VISIT (OUTPATIENT)
Dept: SURGERY | Facility: CLINIC | Age: 54
End: 2019-04-30
Payer: MEDICARE

## 2019-04-30 VITALS — HEIGHT: 62 IN | WEIGHT: 200 LBS | BODY MASS INDEX: 36.8 KG/M2

## 2019-04-30 DIAGNOSIS — Z90.13 ABSENCE OF BREAST, ACQUIRED, BILATERAL: Primary | ICD-10-CM

## 2019-04-30 DIAGNOSIS — Z01.818 PRE-OP TESTING: ICD-10-CM

## 2019-04-30 PROCEDURE — 99212 OFFICE O/P EST SF 10 MIN: CPT | Performed by: SURGERY

## 2019-04-30 RX ORDER — POTASSIUM CHLORIDE 750 MG/1
TABLET, FILM COATED, EXTENDED RELEASE ORAL
Refills: 1 | COMMUNITY
Start: 2019-04-21 | End: 2019-05-09

## 2019-04-30 NOTE — PROGRESS NOTES
Kayley Lee is a 46year old female who presents today for a follow-up. She is without new complaints and would like to proceed with delayed flap based reconstruction in the upcoming weeks.       Physical Examination:   04/30/19  0956   Weight: 90.7 kg (20

## 2019-04-30 NOTE — PROGRESS NOTES
Surgeon: Dr. Holden Dent      Tel:  [phone]    Fax: [phone]     Surgery/Procedure: Bilateral breast reconstruction with abdominal free flap     12 hours, General anesthesia        Hospital:  [facility]: [address] [place] [address]

## 2019-04-30 NOTE — PROGRESS NOTES
Pt given pre-op instructions for surgery. Surgical scrub wash and instructions provided. Surgery to be scheduled on 5/20 at [facility]. Pt instructed to see PCP for medical clearance, pt agreed and understood. Pt state she has already received cardiac

## 2019-05-01 ENCOUNTER — TELEPHONE (OUTPATIENT)
Dept: SURGERY | Facility: CLINIC | Age: 54
End: 2019-05-01

## 2019-05-01 NOTE — TELEPHONE ENCOUNTER
Left message informing patient that there is no OR available on 5/20 at [facility]. Offered to schedule surgery at [facility] on 5/20. Will anticipate a call back.

## 2019-05-01 NOTE — TELEPHONE ENCOUNTER
Patient calling back in regards to surgery. She is ok with having the procedure at [facility] on 5/20. Will f/up with OR schedulers and call back to confirm.

## 2019-05-07 ENCOUNTER — TELEPHONE (OUTPATIENT)
Dept: SURGERY | Facility: CLINIC | Age: 54
End: 2019-05-07

## 2019-05-07 DIAGNOSIS — Z90.13 ABSENCE OF BREAST, ACQUIRED, BILATERAL: Primary | ICD-10-CM

## 2019-05-07 NOTE — TELEPHONE ENCOUNTER
Confirming surgery at [facility] on 5/20. Patient aware of location and estimated start time of surgery.

## 2019-05-08 ENCOUNTER — MEDICAL CORRESPONDENCE (OUTPATIENT)
Dept: SURGERY | Facility: CLINIC | Age: 54
End: 2019-05-08

## 2019-05-08 NOTE — PROGRESS NOTES
Faxed EPIC request for medical clearance to Dr. Arch Sacks office, fax confirmation page received. I will anticipate the clearance.        Also spoke to patient and confirmed that she will be going in for her pre op clearance appointment 5/9/19 @ 3pm.    We s

## 2019-05-09 ENCOUNTER — LAB ENCOUNTER (OUTPATIENT)
Dept: LAB | Facility: HOSPITAL | Age: 54
End: 2019-05-09
Attending: SURGERY
Payer: MEDICARE

## 2019-05-09 DIAGNOSIS — Z01.818 PREOP TESTING: ICD-10-CM

## 2019-05-09 PROBLEM — R94.31 QT PROLONGATION: Status: RESOLVED | Noted: 2018-10-09 | Resolved: 2019-05-09

## 2019-05-09 PROBLEM — R94.31 ABNORMAL EKG: Status: RESOLVED | Noted: 2018-10-09 | Resolved: 2019-05-09

## 2019-05-09 PROCEDURE — 86901 BLOOD TYPING SEROLOGIC RH(D): CPT

## 2019-05-09 PROCEDURE — 86850 RBC ANTIBODY SCREEN: CPT

## 2019-05-09 PROCEDURE — 87641 MR-STAPH DNA AMP PROBE: CPT

## 2019-05-09 PROCEDURE — 86900 BLOOD TYPING SEROLOGIC ABO: CPT

## 2019-05-20 ENCOUNTER — ANESTHESIA EVENT (OUTPATIENT)
Dept: SURGERY | Facility: HOSPITAL | Age: 54
DRG: 585 | End: 2019-05-20
Payer: MEDICARE

## 2019-05-20 ENCOUNTER — ANESTHESIA (OUTPATIENT)
Dept: SURGERY | Facility: HOSPITAL | Age: 54
DRG: 585 | End: 2019-05-20
Payer: MEDICARE

## 2019-05-20 ENCOUNTER — HOSPITAL ENCOUNTER (INPATIENT)
Facility: HOSPITAL | Age: 54
LOS: 3 days | Discharge: HOME | DRG: 585 | End: 2019-05-23
Attending: SURGERY | Admitting: SURGERY
Payer: MEDICARE

## 2019-05-20 DIAGNOSIS — Z01.818 PREOP TESTING: Primary | ICD-10-CM

## 2019-05-20 DIAGNOSIS — Z90.13 ABSENCE OF BREAST, ACQUIRED, BILATERAL: ICD-10-CM

## 2019-05-20 PROCEDURE — 88300 SURGICAL PATH GROSS: CPT | Performed by: SURGERY

## 2019-05-20 PROCEDURE — 0HPT0NZ REMOVAL OF TISSUE EXPANDER FROM RIGHT BREAST, OPEN APPROACH: ICD-10-PCS | Performed by: SURGERY

## 2019-05-20 PROCEDURE — 0HPU0NZ REMOVAL OF TISSUE EXPANDER FROM LEFT BREAST, OPEN APPROACH: ICD-10-PCS | Performed by: SURGERY

## 2019-05-20 PROCEDURE — 88307 TISSUE EXAM BY PATHOLOGIST: CPT | Performed by: SURGERY

## 2019-05-20 PROCEDURE — C9113 INJ PANTOPRAZOLE SODIUM, VIA: HCPCS | Performed by: PHYSICIAN ASSISTANT

## 2019-05-20 PROCEDURE — 0HRV077 REPLACEMENT OF BILATERAL BREAST USING DEEP INFERIOR EPIGASTRIC ARTERY PERFORATOR FLAP, OPEN APPROACH: ICD-10-PCS | Performed by: SURGERY

## 2019-05-20 PROCEDURE — 88305 TISSUE EXAM BY PATHOLOGIST: CPT | Performed by: SURGERY

## 2019-05-20 PROCEDURE — 0HNV0ZZ RELEASE BILATERAL BREAST, OPEN APPROACH: ICD-10-PCS | Performed by: SURGERY

## 2019-05-20 DEVICE — IMPLANTABLE DEVICE: Type: IMPLANTABLE DEVICE | Site: BREAST | Status: FUNCTIONAL

## 2019-05-20 DEVICE — 2.5MM RED COUPLER: Type: IMPLANTABLE DEVICE | Site: BREAST | Status: FUNCTIONAL

## 2019-05-20 RX ORDER — ESCITALOPRAM OXALATE 10 MG/1
40 TABLET ORAL NIGHTLY
Status: DISCONTINUED | OUTPATIENT
Start: 2019-05-20 | End: 2019-05-23

## 2019-05-20 RX ORDER — HYDROCODONE BITARTRATE AND ACETAMINOPHEN 5; 325 MG/1; MG/1
2 TABLET ORAL AS NEEDED
Status: DISCONTINUED | OUTPATIENT
Start: 2019-05-20 | End: 2019-05-21 | Stop reason: HOSPADM

## 2019-05-20 RX ORDER — ASPIRIN 81 MG/1
81 TABLET, CHEWABLE ORAL DAILY
Status: DISCONTINUED | OUTPATIENT
Start: 2019-05-21 | End: 2019-05-23

## 2019-05-20 RX ORDER — ASPIRIN 300 MG
SUPPOSITORY, RECTAL RECTAL AS NEEDED
Status: DISCONTINUED | OUTPATIENT
Start: 2019-05-20 | End: 2019-05-20 | Stop reason: HOSPADM

## 2019-05-20 RX ORDER — ONDANSETRON 4 MG/1
4 TABLET, ORALLY DISINTEGRATING ORAL EVERY 6 HOURS PRN
Status: DISCONTINUED | OUTPATIENT
Start: 2019-05-20 | End: 2019-05-23

## 2019-05-20 RX ORDER — ONDANSETRON 2 MG/ML
INJECTION INTRAMUSCULAR; INTRAVENOUS AS NEEDED
Status: DISCONTINUED | OUTPATIENT
Start: 2019-05-20 | End: 2019-05-20

## 2019-05-20 RX ORDER — MORPHINE SULFATE 4 MG/ML
4 INJECTION, SOLUTION INTRAMUSCULAR; INTRAVENOUS EVERY 10 MIN PRN
Status: DISCONTINUED | OUTPATIENT
Start: 2019-05-20 | End: 2019-05-21 | Stop reason: HOSPADM

## 2019-05-20 RX ORDER — METOCLOPRAMIDE 10 MG/1
10 TABLET ORAL EVERY 6 HOURS PRN
Status: DISCONTINUED | OUTPATIENT
Start: 2019-05-20 | End: 2019-05-23

## 2019-05-20 RX ORDER — CEFAZOLIN SODIUM/WATER 2 G/20 ML
2 SYRINGE (ML) INTRAVENOUS EVERY 8 HOURS
Status: DISCONTINUED | OUTPATIENT
Start: 2019-05-20 | End: 2019-05-20

## 2019-05-20 RX ORDER — ACETAMINOPHEN 325 MG/1
650 TABLET ORAL EVERY 6 HOURS
Status: DISCONTINUED | OUTPATIENT
Start: 2019-05-21 | End: 2019-05-23

## 2019-05-20 RX ORDER — PANTOPRAZOLE SODIUM 40 MG/1
40 TABLET, DELAYED RELEASE ORAL
Status: DISCONTINUED | OUTPATIENT
Start: 2019-05-21 | End: 2019-05-20

## 2019-05-20 RX ORDER — DIPHENHYDRAMINE HCL 25 MG
25 CAPSULE ORAL EVERY 4 HOURS PRN
Status: DISCONTINUED | OUTPATIENT
Start: 2019-05-20 | End: 2019-05-23

## 2019-05-20 RX ORDER — OXYCODONE HYDROCHLORIDE 5 MG/1
10 TABLET ORAL EVERY 4 HOURS PRN
Status: DISCONTINUED | OUTPATIENT
Start: 2019-05-20 | End: 2019-05-21

## 2019-05-20 RX ORDER — ACETAMINOPHEN 10 MG/ML
650 INJECTION, SOLUTION INTRAVENOUS ONCE
Status: DISCONTINUED | OUTPATIENT
Start: 2019-05-20 | End: 2019-05-20

## 2019-05-20 RX ORDER — PROCHLORPERAZINE EDISYLATE 5 MG/ML
5 INJECTION INTRAMUSCULAR; INTRAVENOUS ONCE AS NEEDED
Status: ACTIVE | OUTPATIENT
Start: 2019-05-20 | End: 2019-05-20

## 2019-05-20 RX ORDER — HYDROMORPHONE HYDROCHLORIDE 1 MG/ML
0.5 INJECTION, SOLUTION INTRAMUSCULAR; INTRAVENOUS; SUBCUTANEOUS EVERY 2 HOUR PRN
Status: DISCONTINUED | OUTPATIENT
Start: 2019-05-20 | End: 2019-05-23

## 2019-05-20 RX ORDER — OXYCODONE HYDROCHLORIDE 5 MG/1
5 TABLET ORAL EVERY 4 HOURS PRN
Status: DISCONTINUED | OUTPATIENT
Start: 2019-05-20 | End: 2019-05-21

## 2019-05-20 RX ORDER — GABAPENTIN 600 MG/1
600 TABLET ORAL
Status: DISCONTINUED | OUTPATIENT
Start: 2019-05-20 | End: 2019-05-23

## 2019-05-20 RX ORDER — HYDROMORPHONE HYDROCHLORIDE 1 MG/ML
1 INJECTION, SOLUTION INTRAMUSCULAR; INTRAVENOUS; SUBCUTANEOUS EVERY 2 HOUR PRN
Status: DISCONTINUED | OUTPATIENT
Start: 2019-05-20 | End: 2019-05-23

## 2019-05-20 RX ORDER — KETOROLAC TROMETHAMINE 30 MG/ML
30 INJECTION, SOLUTION INTRAMUSCULAR; INTRAVENOUS EVERY 6 HOURS PRN
Status: DISPENSED | OUTPATIENT
Start: 2019-05-20 | End: 2019-05-22

## 2019-05-20 RX ORDER — METOCLOPRAMIDE HYDROCHLORIDE 5 MG/ML
10 INJECTION INTRAMUSCULAR; INTRAVENOUS EVERY 6 HOURS PRN
Status: DISCONTINUED | OUTPATIENT
Start: 2019-05-20 | End: 2019-05-23

## 2019-05-20 RX ORDER — DOCUSATE SODIUM 100 MG/1
100 CAPSULE, LIQUID FILLED ORAL 2 TIMES DAILY
Status: DISCONTINUED | OUTPATIENT
Start: 2019-05-21 | End: 2019-05-23

## 2019-05-20 RX ORDER — HYDRALAZINE HYDROCHLORIDE 20 MG/ML
INJECTION INTRAMUSCULAR; INTRAVENOUS AS NEEDED
Status: DISCONTINUED | OUTPATIENT
Start: 2019-05-20 | End: 2019-05-20 | Stop reason: SURG

## 2019-05-20 RX ORDER — SODIUM CHLORIDE 9 MG/ML
INJECTION, SOLUTION INTRAVENOUS CONTINUOUS PRN
Status: DISCONTINUED | OUTPATIENT
Start: 2019-05-20 | End: 2019-05-20 | Stop reason: SURG

## 2019-05-20 RX ORDER — CEFAZOLIN SODIUM/WATER 2 G/20 ML
2 SYRINGE (ML) INTRAVENOUS EVERY 8 HOURS
Status: COMPLETED | OUTPATIENT
Start: 2019-05-20 | End: 2019-05-21

## 2019-05-20 RX ORDER — NALOXONE HYDROCHLORIDE 0.4 MG/ML
80 INJECTION, SOLUTION INTRAMUSCULAR; INTRAVENOUS; SUBCUTANEOUS AS NEEDED
Status: ACTIVE | OUTPATIENT
Start: 2019-05-20 | End: 2019-05-20

## 2019-05-20 RX ORDER — OXYCODONE HYDROCHLORIDE 5 MG/1
15 TABLET ORAL EVERY 4 HOURS PRN
Status: DISCONTINUED | OUTPATIENT
Start: 2019-05-20 | End: 2019-05-21

## 2019-05-20 RX ORDER — DEXAMETHASONE SODIUM PHOSPHATE 4 MG/ML
8 VIAL (ML) INJECTION AS NEEDED
Status: DISCONTINUED | OUTPATIENT
Start: 2019-05-20 | End: 2019-05-23

## 2019-05-20 RX ORDER — GABAPENTIN 300 MG/1
600 CAPSULE ORAL 2 TIMES DAILY
Status: DISCONTINUED | OUTPATIENT
Start: 2019-05-20 | End: 2019-05-20

## 2019-05-20 RX ORDER — HALOPERIDOL 5 MG/ML
0.25 INJECTION INTRAMUSCULAR ONCE AS NEEDED
Status: ACTIVE | OUTPATIENT
Start: 2019-05-20 | End: 2019-05-20

## 2019-05-20 RX ORDER — HEPARIN SODIUM 5000 [USP'U]/ML
5000 INJECTION, SOLUTION INTRAVENOUS; SUBCUTANEOUS ONCE
Status: DISCONTINUED | OUTPATIENT
Start: 2019-05-20 | End: 2019-05-20 | Stop reason: HOSPADM

## 2019-05-20 RX ORDER — ACETAMINOPHEN 500 MG
1000 TABLET ORAL ONCE
Status: DISCONTINUED | OUTPATIENT
Start: 2019-05-20 | End: 2019-05-20 | Stop reason: HOSPADM

## 2019-05-20 RX ORDER — MORPHINE SULFATE 2 MG/ML
2 INJECTION, SOLUTION INTRAMUSCULAR; INTRAVENOUS EVERY 10 MIN PRN
Status: DISCONTINUED | OUTPATIENT
Start: 2019-05-20 | End: 2019-05-21 | Stop reason: HOSPADM

## 2019-05-20 RX ORDER — MORPHINE SULFATE 10 MG/ML
6 INJECTION, SOLUTION INTRAMUSCULAR; INTRAVENOUS EVERY 10 MIN PRN
Status: DISCONTINUED | OUTPATIENT
Start: 2019-05-20 | End: 2019-05-21 | Stop reason: HOSPADM

## 2019-05-20 RX ORDER — HYDROMORPHONE HYDROCHLORIDE 1 MG/ML
0.2 INJECTION, SOLUTION INTRAMUSCULAR; INTRAVENOUS; SUBCUTANEOUS EVERY 5 MIN PRN
Status: DISCONTINUED | OUTPATIENT
Start: 2019-05-20 | End: 2019-05-21 | Stop reason: HOSPADM

## 2019-05-20 RX ORDER — HYDROMORPHONE HYDROCHLORIDE 1 MG/ML
0.4 INJECTION, SOLUTION INTRAMUSCULAR; INTRAVENOUS; SUBCUTANEOUS EVERY 5 MIN PRN
Status: DISCONTINUED | OUTPATIENT
Start: 2019-05-20 | End: 2019-05-21 | Stop reason: HOSPADM

## 2019-05-20 RX ORDER — MORPHINE SULFATE 4 MG/ML
4 INJECTION, SOLUTION INTRAMUSCULAR; INTRAVENOUS EVERY 2 HOUR PRN
Status: DISCONTINUED | OUTPATIENT
Start: 2019-05-20 | End: 2019-05-23

## 2019-05-20 RX ORDER — ONDANSETRON 2 MG/ML
4 INJECTION INTRAMUSCULAR; INTRAVENOUS ONCE AS NEEDED
Status: ACTIVE | OUTPATIENT
Start: 2019-05-20 | End: 2019-05-20

## 2019-05-20 RX ORDER — ACETAMINOPHEN 325 MG/1
650 TABLET ORAL EVERY 6 HOURS
Status: DISCONTINUED | OUTPATIENT
Start: 2019-05-20 | End: 2019-05-20

## 2019-05-20 RX ORDER — HYDROMORPHONE HYDROCHLORIDE 1 MG/ML
0.6 INJECTION, SOLUTION INTRAMUSCULAR; INTRAVENOUS; SUBCUTANEOUS EVERY 5 MIN PRN
Status: DISCONTINUED | OUTPATIENT
Start: 2019-05-20 | End: 2019-05-21 | Stop reason: HOSPADM

## 2019-05-20 RX ORDER — ONDANSETRON 2 MG/ML
INJECTION INTRAMUSCULAR; INTRAVENOUS AS NEEDED
Status: DISCONTINUED | OUTPATIENT
Start: 2019-05-20 | End: 2019-05-20 | Stop reason: SURG

## 2019-05-20 RX ORDER — DIPHENHYDRAMINE HYDROCHLORIDE 50 MG/ML
12.5 INJECTION INTRAMUSCULAR; INTRAVENOUS EVERY 4 HOURS PRN
Status: DISCONTINUED | OUTPATIENT
Start: 2019-05-20 | End: 2019-05-23

## 2019-05-20 RX ORDER — DEXAMETHASONE SODIUM PHOSPHATE 4 MG/ML
VIAL (ML) INJECTION AS NEEDED
Status: DISCONTINUED | OUTPATIENT
Start: 2019-05-20 | End: 2019-05-20 | Stop reason: SURG

## 2019-05-20 RX ORDER — SODIUM CHLORIDE, SODIUM LACTATE, POTASSIUM CHLORIDE, CALCIUM CHLORIDE 600; 310; 30; 20 MG/100ML; MG/100ML; MG/100ML; MG/100ML
INJECTION, SOLUTION INTRAVENOUS CONTINUOUS
Status: DISCONTINUED | OUTPATIENT
Start: 2019-05-20 | End: 2019-05-21 | Stop reason: HOSPADM

## 2019-05-20 RX ORDER — ACETAMINOPHEN 10 MG/ML
650 INJECTION, SOLUTION INTRAVENOUS ONCE
Status: COMPLETED | OUTPATIENT
Start: 2019-05-20 | End: 2019-05-20

## 2019-05-20 RX ORDER — HEPARIN SODIUM 5000 [USP'U]/ML
INJECTION, SOLUTION INTRAVENOUS; SUBCUTANEOUS AS NEEDED
Status: DISCONTINUED | OUTPATIENT
Start: 2019-05-20 | End: 2019-05-20 | Stop reason: HOSPADM

## 2019-05-20 RX ORDER — FAMOTIDINE 20 MG/1
20 TABLET ORAL ONCE
Status: COMPLETED | OUTPATIENT
Start: 2019-05-20 | End: 2019-05-20

## 2019-05-20 RX ORDER — HYDROCODONE BITARTRATE AND ACETAMINOPHEN 5; 325 MG/1; MG/1
1 TABLET ORAL AS NEEDED
Status: DISCONTINUED | OUTPATIENT
Start: 2019-05-20 | End: 2019-05-21 | Stop reason: HOSPADM

## 2019-05-20 RX ORDER — OXYBUTYNIN CHLORIDE 5 MG/1
5 TABLET ORAL 2 TIMES DAILY
Status: DISCONTINUED | OUTPATIENT
Start: 2019-05-20 | End: 2019-05-20

## 2019-05-20 RX ORDER — ENOXAPARIN SODIUM 100 MG/ML
40 INJECTION SUBCUTANEOUS DAILY
Status: DISCONTINUED | OUTPATIENT
Start: 2019-05-21 | End: 2019-05-23

## 2019-05-20 RX ORDER — ONDANSETRON 2 MG/ML
4 INJECTION INTRAMUSCULAR; INTRAVENOUS EVERY 6 HOURS PRN
Status: DISCONTINUED | OUTPATIENT
Start: 2019-05-20 | End: 2019-05-23

## 2019-05-20 RX ORDER — LOSARTAN POTASSIUM 25 MG/1
25 TABLET ORAL NIGHTLY
Status: DISCONTINUED | OUTPATIENT
Start: 2019-05-20 | End: 2019-05-20

## 2019-05-20 RX ORDER — CEFAZOLIN SODIUM/WATER 2 G/20 ML
2 SYRINGE (ML) INTRAVENOUS ONCE
Status: COMPLETED | OUTPATIENT
Start: 2019-05-20 | End: 2019-05-20

## 2019-05-20 RX ORDER — ROCURONIUM BROMIDE 10 MG/ML
INJECTION, SOLUTION INTRAVENOUS AS NEEDED
Status: DISCONTINUED | OUTPATIENT
Start: 2019-05-20 | End: 2019-05-20 | Stop reason: SURG

## 2019-05-20 RX ORDER — HYDROMORPHONE HYDROCHLORIDE 1 MG/ML
1.2 INJECTION, SOLUTION INTRAMUSCULAR; INTRAVENOUS; SUBCUTANEOUS EVERY 2 HOUR PRN
Status: DISCONTINUED | OUTPATIENT
Start: 2019-05-20 | End: 2019-05-23

## 2019-05-20 RX ORDER — SODIUM CHLORIDE, SODIUM LACTATE, POTASSIUM CHLORIDE, CALCIUM CHLORIDE 600; 310; 30; 20 MG/100ML; MG/100ML; MG/100ML; MG/100ML
INJECTION, SOLUTION INTRAVENOUS CONTINUOUS
Status: DISCONTINUED | OUTPATIENT
Start: 2019-05-20 | End: 2019-05-23

## 2019-05-20 RX ADMIN — DEXAMETHASONE SODIUM PHOSPHATE 4 MG: 4 MG/ML VIAL (ML) INJECTION at 07:52:00

## 2019-05-20 RX ADMIN — SODIUM CHLORIDE: 9 INJECTION, SOLUTION INTRAVENOUS at 16:47:00

## 2019-05-20 RX ADMIN — CEFAZOLIN SODIUM/WATER 2 G: 2 G/20 ML SYRINGE (ML) INTRAVENOUS at 11:46:00

## 2019-05-20 RX ADMIN — HYDRALAZINE HYDROCHLORIDE 5 MG: 20 INJECTION INTRAMUSCULAR; INTRAVENOUS at 08:38:00

## 2019-05-20 RX ADMIN — CEFAZOLIN SODIUM/WATER 2 G: 2 G/20 ML SYRINGE (ML) INTRAVENOUS at 15:35:00

## 2019-05-20 RX ADMIN — ROCURONIUM BROMIDE 20 MG: 10 INJECTION, SOLUTION INTRAVENOUS at 12:12:00

## 2019-05-20 RX ADMIN — ROCURONIUM BROMIDE 20 MG: 10 INJECTION, SOLUTION INTRAVENOUS at 13:55:00

## 2019-05-20 RX ADMIN — ONDANSETRON 4 MG: 2 INJECTION INTRAMUSCULAR; INTRAVENOUS at 16:23:00

## 2019-05-20 RX ADMIN — CEFAZOLIN SODIUM/WATER 2 G: 2 G/20 ML SYRINGE (ML) INTRAVENOUS at 07:49:00

## 2019-05-20 RX ADMIN — SODIUM CHLORIDE: 9 INJECTION, SOLUTION INTRAVENOUS at 10:22:00

## 2019-05-20 RX ADMIN — ROCURONIUM BROMIDE 10 MG: 10 INJECTION, SOLUTION INTRAVENOUS at 09:03:00

## 2019-05-20 RX ADMIN — SODIUM CHLORIDE, SODIUM LACTATE, POTASSIUM CHLORIDE, CALCIUM CHLORIDE: 600; 310; 30; 20 INJECTION, SOLUTION INTRAVENOUS at 13:23:00

## 2019-05-20 RX ADMIN — ROCURONIUM BROMIDE 20 MG: 10 INJECTION, SOLUTION INTRAVENOUS at 09:50:00

## 2019-05-20 RX ADMIN — SODIUM CHLORIDE, SODIUM LACTATE, POTASSIUM CHLORIDE, CALCIUM CHLORIDE: 600; 310; 30; 20 INJECTION, SOLUTION INTRAVENOUS at 15:41:00

## 2019-05-20 RX ADMIN — ROCURONIUM BROMIDE 50 MG: 10 INJECTION, SOLUTION INTRAVENOUS at 07:36:00

## 2019-05-20 RX ADMIN — SODIUM CHLORIDE, SODIUM LACTATE, POTASSIUM CHLORIDE, CALCIUM CHLORIDE: 600; 310; 30; 20 INJECTION, SOLUTION INTRAVENOUS at 07:30:00

## 2019-05-20 RX ADMIN — ROCURONIUM BROMIDE 10 MG: 10 INJECTION, SOLUTION INTRAVENOUS at 10:58:00

## 2019-05-20 RX ADMIN — SODIUM CHLORIDE: 9 INJECTION, SOLUTION INTRAVENOUS at 07:38:00

## 2019-05-20 NOTE — ANESTHESIA PROCEDURE NOTES
Peripheral IV  Date/Time: 5/20/2019 7:38 AM  Inserted by: Rai Smith CRNA    Placement  Needle size: 18 G  Laterality: left  Location: wrist  Local anesthetic: none  Site prep: alcohol  Technique: anatomical landmarks  Attempts: 1

## 2019-05-20 NOTE — BRIEF OP NOTE
Pre-Operative Diagnosis: Absence of breast, acquired, bilateral [Z90.13]     Post-Operative Diagnosis: Absence of breast, acquired, bilateral [Z90.13]      Procedure Performed:   Procedure(s):  Bilateral breast reconstruction with abdominal free flap    Tello

## 2019-05-20 NOTE — ANESTHESIA PREPROCEDURE EVALUATION
Anesthesia PreOp Note    HPI:     Alana Calix is a 46year old female who presents for preoperative consultation requested by: Jaimie Hernandez MD    Date of Surgery: 5/20/2019    Procedure(s):   MICRO FLAP  Indication: Absence of breast, acquired, bilateral ARTHROSCOPY, ANKLE, SURGICAL; W/ANKLE ARTHRODESIS     • BREAST RECONSTRUCTION Bilateral 10/15/2018    Breast Reconstruction with Bilateral Tissue Expander Placement   • BREAST RECONSTRUCTION/ IMMEDIATE/EXPANDER Bilateral 10/15/2018    Performed by Bharati Mitchell, Rfl: 0 More than a month at Unknown time       Current Facility-Administered Medications Ordered in Epic:  lactated ringers infusion  Intravenous Continuous Kong Eden MD Last Rate: 20 mL/hr at 05/20/19 0626   acetaminophen (TYLENOL EXTRA STRENGTH) tab scars   • Ovarian Cancer Neg      Social History    Socioeconomic History      Marital status:       Spouse name: Not on file      Number of children: 1      Years of education: Not on file      Highest education level: Not on file    Occupational reviewed.      Lab Results   Component Value Date     05/09/2019    K 3.52 05/09/2019    CL 95 (L) 05/09/2019    CO2 32.5 (H) 05/09/2019    BUN 8.0 05/09/2019    CREATSERUM 0.86 05/09/2019    GLU 95 05/09/2019    CA 8.8 05/09/2019          Vital Signs planned.   LENNY VILLEGAS  5/20/2019 7:03 AM

## 2019-05-20 NOTE — H&P
No change in Dr. Brittney Miles H&P from 5/9. We reviewed the plan for bilateral breast reconstruction with abdominal free flap reviewed.  The risks of surgery including but not limited to bleeding, infection, scarring, seroma, asymmetry, partial/total flap loss,

## 2019-05-20 NOTE — H&P
[facility] Hospitalist H&P     CC: breast reconstruction     PCP: Junior Faulkner MD    Admission Date:     ASSESSMENT / PLAN:   Ms. 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 Disp: 30 tablet Rfl: 0   Zolpidem Tartrate 10 MG Oral Tab Take 10 mg by mouth nightly as needed for Sleep.  Disp:  Rfl: non-tender, non-distended, +BS  Breast: bilateral mastectomy, abdominal JUAN drain in place  MSK: moving extremities  Neuro: unable to assess as pt is not currently following commands  Psych: Affect- normal  SKIN: warm, dry  EXT: no edema    Diagnostic Data:

## 2019-05-20 NOTE — ANESTHESIA POSTPROCEDURE EVALUATION
Patient: Francis Pinto    Procedure Summary     Date:  05/20/19 Room / Location:  [facility] OR 06 / [facility] OR    Anesthesia Start:  0730 Anesthesia Stop:      Procedure:  MICRO FLAP (Bilateral Breast) Diagnosis:       Absence of breast, acquired, bilateral

## 2019-05-20 NOTE — ANESTHESIA PROCEDURE NOTES
Airway  Date/Time: 5/20/2019 7:49 AM  Urgency: elective    Airway not difficult    General Information and Staff    Patient location during procedure: OR  Anesthesiologist: Antony Bradford MD  Resident/CRNA: Antonio Manuel CRNA  Performed: CRNA     I

## 2019-05-21 PROCEDURE — C9113 INJ PANTOPRAZOLE SODIUM, VIA: HCPCS | Performed by: PHYSICIAN ASSISTANT

## 2019-05-21 PROCEDURE — 85025 COMPLETE CBC W/AUTO DIFF WBC: CPT | Performed by: INTERNAL MEDICINE

## 2019-05-21 PROCEDURE — 80048 BASIC METABOLIC PNL TOTAL CA: CPT | Performed by: INTERNAL MEDICINE

## 2019-05-21 RX ORDER — SODIUM CHLORIDE, SODIUM LACTATE, POTASSIUM CHLORIDE, CALCIUM CHLORIDE 600; 310; 30; 20 MG/100ML; MG/100ML; MG/100ML; MG/100ML
INJECTION, SOLUTION INTRAVENOUS ONCE
Status: COMPLETED | OUTPATIENT
Start: 2019-05-21 | End: 2019-05-21

## 2019-05-21 RX ORDER — HYDROCODONE BITARTRATE AND ACETAMINOPHEN 5; 325 MG/1; MG/1
1-2 TABLET ORAL EVERY 6 HOURS PRN
Status: DISCONTINUED | OUTPATIENT
Start: 2019-05-21 | End: 2019-05-23

## 2019-05-21 RX ORDER — POTASSIUM CHLORIDE 20 MEQ/1
40 TABLET, EXTENDED RELEASE ORAL EVERY 4 HOURS
Status: COMPLETED | OUTPATIENT
Start: 2019-05-21 | End: 2019-05-21

## 2019-05-21 RX ORDER — MELATONIN
325
Status: DISCONTINUED | OUTPATIENT
Start: 2019-05-21 | End: 2019-05-23

## 2019-05-21 NOTE — PROGRESS NOTES
Pt transferred to room 203 from OR after bilat breast flap. VSS, bilat breast flap w/good pulse ox and signal, strong doppler, abd and umbilical incisions c/d/i, 4 JUAN's w/bloody drainage to bulb suction, mitchell intact.      Pt confused and constantly yellin

## 2019-05-21 NOTE — OPERATIVE REPORT
[facility]    PATIENT'S NAME: Rajat Gonzalesdeb AHUJA   ATTENDING PHYSICIAN: Reg Charles MD   OPERATING PHYSICIAN: Reg Charles MD   PATIENT ACCOUNT#:   [id number]    LOCATION:  [address] [place] [address] RECORD #:   [id number]       DATE OF BIRTH:  [birth year] the operating room, properly identified, and placed in a supine position. Sequential compression devices were placed on bilateral lower extremities. Intravenous antibiotic prophylaxis was administered.   The patient then underwent successful induction of inferior incision was created with a scalpel. The subcutaneous tissues were divided with electrocautery. The superficial inferior epigastric vein was identified, dissected for several centimeters, clipped, and divided.   The umbilicus was then incised wit arterial anastomosis was performed with interrupted 8-0 Prolene suture. Venous and arterial control were released. The anastomoses were noted to be hemostatic and the flap was noted to have normal perfusion.   The flap was then placed in the chest wall po extubated, and taken to the intensive care unit in stable condition. There were no apparent complications. All needle, sponge, and instrument counts were correct at the end of the procedure. Dictated By Shree Cornell MD  d: 05/20/2019 17:08:23  t:

## 2019-05-21 NOTE — PROGRESS NOTES
[facility] Hospitalist Progress Note     Reason for Admission: post op breast reconstruction  PCP: Maggy Araujo MD     Assessment/Plan:     Active Problems:    Preop testing    Ms. Aki Medina is a 46year old female with PMH of anxiety, depression, HTN, GERD, fib oz (90.5 kg)  04/30/19 0956 : 200 lb (90.7 kg)      Exam   GEN: NAD, drowsy  HEENT: EOMI, PERRLA  Neck: Supple, no JVD  Pulm: CTAB, no crackles or wheezes  CV: RRR, no murmurs, 2+ peripheral pulses  ABD: Soft, non-tender, non-distended, +BS  Breast: bilate

## 2019-05-21 NOTE — PROGRESS NOTES
Pain controlled with Toradol.  Received fluid Bolus for oliguria  AVSS  Hgb 9.9  Sleeping->arousable, answers questions  Flaps warm, soft, +doppler, nl cap refill  Abdominal incisions c/d/i  Umbilicus viable  Drain effluent serosanguinous  A/P:  POD #1  Fla

## 2019-05-21 NOTE — PLAN OF CARE
Pt continues to have AMS with improvement post-op, Mds are aware. Pt now follows commands and is now answering questions when asked. Per family, pt has exhibited similar behavior post-op during other various surgery recoveries.  Breast flaps & abd incision opioid side effects  - Notify MD/LIP if interventions unsuccessful or patient reports new pain  - Anticipate increased pain with activity and pre-medicate as appropriate  Outcome: Progressing     Problem: RISK FOR INFECTION - ADULT  Goal: Absence of fever/ hemorrhage  - Monitor temperature, glucose, and sodium.  Initiate appropriate interventions as ordered  Outcome: Progressing     Problem: Impaired Functional Mobility  Goal: Achieve highest/safest level of mobility/gait  Description  Interventions:  - Asses

## 2019-05-21 NOTE — OPERATIVE REPORT
[facility]    PATIENT'S NAME: Marlin AHUJA   ATTENDING PHYSICIAN: Reg Cornell MD   OPERATING PHYSICIAN: Keshia Smith MD   PATIENT ACCOUNT#:   [id number]    LOCATION:  [address] [place] [address] RECORD #:   [id number]       DATE OF BIRTH:  [birth year] area were prepped and draped in the usual sterile fashion.   The procedure was then started with a 2-team approach, where Dr. Karissa Ewing was assisting me during the flap  dissection, as well as the microsurgical anastomosis, and I assisted him during flap was disconnected with medium clips, flushed with heparinized saline, and then transferred to the chest.  The internal mammary artery and vein were clamped and then clipped distally with the medium clip, flushed with heparinized saline, and then the ve

## 2019-05-22 PROCEDURE — 84132 ASSAY OF SERUM POTASSIUM: CPT | Performed by: INTERNAL MEDICINE

## 2019-05-22 PROCEDURE — C9113 INJ PANTOPRAZOLE SODIUM, VIA: HCPCS | Performed by: PHYSICIAN ASSISTANT

## 2019-05-22 RX ORDER — ZOLPIDEM TARTRATE 5 MG/1
10 TABLET ORAL ONCE
Status: COMPLETED | OUTPATIENT
Start: 2019-05-22 | End: 2019-05-22

## 2019-05-22 NOTE — PROGRESS NOTES
Pain controlled.  OOB to chair  AVSS  Awake and alert  Flaps warm, soft, +doppler, nl cap refill  Abdominal incisions c/d/i  Umbilicus viable  Drain effluent serosanguinous  A/P:  POD #2  Flaps well-perfused  Continue monitoring  Ambulated  Possible d/c Fol

## 2019-05-22 NOTE — PLAN OF CARE
Pt in general is progressing well in the post-op period. Reports pain control and declined PRN pain meds. Motivated to be up in the chair and to ambulate to restroom. Roberts d/c'd today. Making adequate urine.      Problem: PAIN - ADULT  Goal: Verbalizes/dis toileting schedule  Outcome: Progressing    Bed in lowest/locked position, call light within reach and pt uses appropriately when she needs assistance to restroom. Verbalized understanding of safety plan. Does not attempt to get up without assistance.

## 2019-05-22 NOTE — PROGRESS NOTES
[facility] Hospitalist Progress Note     Reason for Admission: post op breast reconstruction  PCP: Wanda Fuentes MD     Assessment/Plan:     Active Problems:    Preop testing    Ms. Eli Quiroz is a 46year old female with PMH of anxiety, depression, HTN, GERD, fib 5/22/2019 1125  Gross per 24 hour   Intake 4493 ml   Output 1108 ml   Net 3385 ml       Last 3 Weights  05/20/19 0600 : 202 lb (91.6 kg)  05/09/19 1422 : 200 lb (90.7 kg)  05/09/19 1509 : 199 lb 8 oz (90.5 kg)  04/30/19 0956 : 200 lb (90.7 kg)      Exam

## 2019-05-22 NOTE — PLAN OF CARE
Pt is Aox4 - drowsy, VSS. Bilateral breast flaps are perfusing well, good cap refill, strong pulses. Better UO post bolus. Pt did well transferring from chair to bed. Pain managed with PRNs as needed. Will continue to monitor.      Problem: Patient Centered frequently for physical needs  - Identify cognitive and physical deficits and behaviors that affect risk of falls.   - [employer] fall precautions as indicated by assessment.  - Educate pt/family on patient safety including physical limitations  - Instruct p Cognition  Goal: Patient will exhibit improved attention, thought processing and/or memory  Description  Interventions:  - Minimize distractions in the room when full attention is required  - Allow additional time for processing after asking questions or p

## 2019-05-23 VITALS
RESPIRATION RATE: 14 BRPM | DIASTOLIC BLOOD PRESSURE: 88 MMHG | OXYGEN SATURATION: 94 % | BODY MASS INDEX: 39.26 KG/M2 | WEIGHT: 213.31 LBS | HEART RATE: 94 BPM | SYSTOLIC BLOOD PRESSURE: 123 MMHG | HEIGHT: 62 IN | TEMPERATURE: 99 F

## 2019-05-23 PROCEDURE — 97530 THERAPEUTIC ACTIVITIES: CPT

## 2019-05-23 PROCEDURE — 80048 BASIC METABOLIC PNL TOTAL CA: CPT | Performed by: INTERNAL MEDICINE

## 2019-05-23 PROCEDURE — 97161 PT EVAL LOW COMPLEX 20 MIN: CPT

## 2019-05-23 PROCEDURE — 97166 OT EVAL MOD COMPLEX 45 MIN: CPT

## 2019-05-23 PROCEDURE — 85027 COMPLETE CBC AUTOMATED: CPT | Performed by: INTERNAL MEDICINE

## 2019-05-23 PROCEDURE — 97535 SELF CARE MNGMENT TRAINING: CPT

## 2019-05-23 RX ORDER — MELATONIN
325
Qty: 30 TABLET | Refills: 0 | Status: SHIPPED | COMMUNITY
Start: 2019-05-24

## 2019-05-23 RX ORDER — GABAPENTIN 600 MG/1
1200 TABLET ORAL
Status: DISCONTINUED | OUTPATIENT
Start: 2019-05-23 | End: 2019-05-23

## 2019-05-23 RX ORDER — ENOXAPARIN SODIUM 100 MG/ML
40 INJECTION SUBCUTANEOUS DAILY
Qty: 14 SYRINGE | Refills: 0 | Status: SHIPPED | OUTPATIENT
Start: 2019-05-23 | End: 2019-06-27

## 2019-05-23 RX ORDER — GABAPENTIN 600 MG/1
600 TABLET ORAL
Status: DISCONTINUED | OUTPATIENT
Start: 2019-05-23 | End: 2019-05-23

## 2019-05-23 RX ORDER — DOCUSATE SODIUM 100 MG/1
100 CAPSULE, LIQUID FILLED ORAL 2 TIMES DAILY
Qty: 40 CAPSULE | Refills: 0 | Status: SHIPPED | OUTPATIENT
Start: 2019-05-23 | End: 2019-06-27

## 2019-05-23 RX ORDER — HYDROCODONE BITARTRATE AND ACETAMINOPHEN 5; 325 MG/1; MG/1
1-2 TABLET ORAL EVERY 4 HOURS PRN
Qty: 40 TABLET | Refills: 0 | Status: SHIPPED | OUTPATIENT
Start: 2019-05-23 | End: 2019-06-27

## 2019-05-23 RX ORDER — ONDANSETRON 4 MG/1
4 TABLET, FILM COATED ORAL EVERY 8 HOURS PRN
Qty: 20 TABLET | Refills: 0 | Status: SHIPPED | OUTPATIENT
Start: 2019-05-23 | End: 2019-06-27

## 2019-05-23 RX ORDER — ASPIRIN 81 MG/1
81 TABLET ORAL DAILY
Qty: 14 TABLET | Refills: 0 | Status: ON HOLD | OUTPATIENT
Start: 2019-05-23 | End: 2019-11-14

## 2019-05-23 NOTE — PLAN OF CARE
Problem: Patient Centered Care  Goal: Patient preferences are identified and integrated in the patient's plan of care  Description  Interventions:  - What would you like us to know as we care for you? Per family pt has hx of AMS post-op.    - Provide time physical limitations  - Instruct pt to call for assistance with activity based on assessment  - Modify environment to reduce risk of injury  - Provide assistive devices as appropriate  - Consider OT/PT consult to assist with strengthening/mobility  - Encou

## 2019-05-23 NOTE — CM/SW NOTE
Pt anticipating discharge today. POD #3-bilateral mastectomy now with reconstruction,doing well.     Pt lives with best friend in a home with son, able to do  ADLs has a caregiver 3x/week and cleaning lady, no oxygen needs, ambulates on own, no h/o HHC or

## 2019-05-23 NOTE — PROGRESS NOTES
PRS   POD # 3 s/p BL TIMUR free flap  Pain controlled.  OOB to chair  AVSS  Awake and alert  Flaps warm, soft, +doppler, nl cap refill vioptix 55 and 65%  Abdominal incisions c/d/i  Umbilicus viable  Drain effluent serosanguinous  A/P:  POD #3  Flaps well-

## 2019-05-23 NOTE — PHYSICAL THERAPY NOTE
PHYSICAL THERAPY EVALUATION - INPATIENT     Room Number: 203/203-A  Evaluation Date: 5/23/2019  Type of Evaluation: Initial   Physician Order: PT Eval and Treat    Presenting Problem: bilateral breast reconstruction with abdominal free flap  Reason for not feel she needs HHPT at this time as she has a 24 hour caregiver arranged. DISCHARGE RECOMMENDATIONS  PT Discharge Recommendations: Home;24 hour care/supervision    PLAN  PT Treatment Plan: Bed mobility; Endurance; Energy conservation;Patient educatio HISTORY  10/2018    bilateral simple mastectomies - Dr. Serafin Sol   • [address] [facility]      C3-T1 FUSIONS       HOME SITUATION  Type of Home: House   Home Layout: Two level  Stairs to Enter : 1     Stairs to Bedroom: 12  Railing: Yes    L and from a bed to a chair (including a wheelchair)?: A Little   -   Need to walk in hospital room?: A Little   -   Climbing 3-5 steps with a railing?: A Little     AM-PAC Score:  Raw Score: 18   Approx Degree of Impairment: 46.58%   Standardized Score (AM-

## 2019-05-23 NOTE — PLAN OF CARE
Apparently with confusion immediately post op yesterday and inability to follow commands - did not exhibit any neurological issues this shift.     Problem: NEUROLOGICAL - ADULT  Goal: Achieves stable or improved neurological status  Description  INTERVLAWRENCEO

## 2019-05-23 NOTE — OCCUPATIONAL THERAPY NOTE
OCCUPATIONAL THERAPY EVALUATION - INPATIENT     Room Number: 255/634-E  Evaluation Date: 5/23/2019  Type of Evaluation: Initial  Presenting Problem: (breast reconstruction)    Physician Order: IP Consult to Occupational Therapy  Reason for Therapy: ADL/I RECOMMENDATIONS  OT Discharge Recommendations: Home;24 hour care/supervision  OT Device Recommendations: None    PLAN    Patient has been evaluated and presents with no skilled Occupational Therapy needs  at this time.   Patient will be discharged from [facility] toilet  Shower/Tub and Equipment: Tub-shower combo  Other Equipment: (rollator)    Occupation/Status: (currently in [employer];)  Drives: Yes  Patient Regularly Uses: None    Stairs in Home: 1 stair to enter;2 story home Pt will remain on main level  Use of Ass supervision  Dynamic Standing: supervision/cga w/o ad    FUNCTIONAL ADL ASSESSMENT  Grooming: mod i  Feeding: min a for set up  Bathing: mod malik  Toileting: max a  Upper Extremity Dressing: min a  Lower Extremity Dressing: max a    Education Provided: Antonette valdivia

## 2019-05-23 NOTE — DISCHARGE SUMMARY
[facility] Hospitalist Discharge Summary   Patient ID:  Kenzie Pinedo [id number]  34 year old 10/18/1965    Admit date: 5/20/2019  Discharge date: 5/23/2019  Risk of Readmission Lace+ Score: 18  59-90 High Risk  29-58 Medium Risk  0-28   Low Risk    Primary Care Ph ferrous sulfate 325 (65 FE) MG Tbec  Take 1 tablet (325 mg total) by mouth daily with breakfast.  Start taking on:  5/24/2019     Ondansetron HCl 4 mg tablet  Commonly known as:  ZOFRAN  Take 1 tablet (4 mg total) by mouth every 8 (eight) hours as needed. · Enoxaparin Sodium 40 MG/0.4ML Soln  · HYDROcodone-acetaminophen 5-325 MG Tabs  · Ondansetron HCl 4 mg tablet     Information about where to get these medications is not yet available    Ask your nurse or doctor about these medications  · ferrous sulfate

## 2019-05-27 ENCOUNTER — APPOINTMENT (OUTPATIENT)
Dept: ULTRASOUND IMAGING | Facility: HOSPITAL | Age: 54
End: 2019-05-27
Attending: EMERGENCY MEDICINE
Payer: MEDICARE

## 2019-05-27 ENCOUNTER — HOSPITAL ENCOUNTER (EMERGENCY)
Facility: HOSPITAL | Age: 54
Discharge: HOME | End: 2019-05-27
Attending: EMERGENCY MEDICINE
Payer: MEDICARE

## 2019-05-27 VITALS
TEMPERATURE: 98 F | HEART RATE: 78 BPM | SYSTOLIC BLOOD PRESSURE: 134 MMHG | DIASTOLIC BLOOD PRESSURE: 82 MMHG | OXYGEN SATURATION: 96 % | RESPIRATION RATE: 16 BRPM

## 2019-05-27 DIAGNOSIS — R60.9 EDEMA, UNSPECIFIED TYPE: Primary | ICD-10-CM

## 2019-05-27 PROCEDURE — 93971 EXTREMITY STUDY: CPT | Performed by: EMERGENCY MEDICINE

## 2019-05-27 PROCEDURE — 99284 EMERGENCY DEPT VISIT MOD MDM: CPT

## 2019-05-27 NOTE — ED PROVIDER NOTES
Patient Seen in: [facility] AND [facility] Emergency Department    History   Patient presents with:  Swelling Edema (cardiovascular, metabolic)    Stated Complaint:     HPI    48-year-old female who is relatively healthy who recently she got a hospital for bilat SURGERY PROCEDURE UNLISTED      C3-T1 FUSIONS           Social History    Tobacco Use      Smoking status: Never Smoker      Smokeless tobacco: Never Used    Alcohol use: No      Alcohol/week: 0.0 oz      Frequency: Never    Drug use: No      Review of Sys TECHNIQUE: Color duplex Doppler venous ultrasound of the left lower extremity was performed in the usual manner. FINDINGS: The femoral and popliteal veins appear normal.  Normal flow was demonstrated with color and pulsed Doppler.   Visualized portions of

## 2019-05-27 NOTE — ED INITIAL ASSESSMENT (HPI)
Pt here for swelling to left foot which began yesterday. Denies CP or SOB.  Pt is 1 week post op laura flap surgery

## 2019-05-28 DIAGNOSIS — R11.0 NAUSEA: Primary | ICD-10-CM

## 2019-05-28 RX ORDER — METOCLOPRAMIDE 10 MG/1
10 TABLET ORAL EVERY 8 HOURS PRN
Qty: 20 TABLET | Refills: 0 | Status: SHIPPED | OUTPATIENT
Start: 2019-05-28 | End: 2019-12-13 | Stop reason: ALTCHOICE

## 2019-05-31 ENCOUNTER — OFFICE VISIT (OUTPATIENT)
Dept: SURGERY | Facility: CLINIC | Age: 54
End: 2019-05-31
Payer: MEDICARE

## 2019-05-31 DIAGNOSIS — Z90.13 ABSENCE OF BREAST, ACQUIRED, BILATERAL: Primary | ICD-10-CM

## 2019-05-31 PROCEDURE — 99024 POSTOP FOLLOW-UP VISIT: CPT | Performed by: PHYSICIAN ASSISTANT

## 2019-05-31 NOTE — PROGRESS NOTES
This is a 53-year-old female that is 11 days status post her bilateral breast reconstruction with TIMUR flap. She is here today for wound check, flap check, and drain evaluation. She denies any fevers or signs of infection.   He has been compliant with no serosanguinous drainage following removal.

## 2019-06-05 ENCOUNTER — SOCIAL WORK SERVICES (OUTPATIENT)
Dept: HEMATOLOGY/ONCOLOGY | Facility: HOSPITAL | Age: 54
End: 2019-06-05

## 2019-06-05 NOTE — PROGRESS NOTES
SUZIE completed patients son [place] paperwork and faxed to [employer] and disability team at fax # [phone]. SUZIE also put the paperwork in the locked drawer at [facility] in [facility]. Patient will  on Friday.

## 2019-06-07 ENCOUNTER — SOCIAL WORK SERVICES (OUTPATIENT)
Dept: HEMATOLOGY/ONCOLOGY | Facility: HOSPITAL | Age: 54
End: 2019-06-07

## 2019-06-07 ENCOUNTER — OFFICE VISIT (OUTPATIENT)
Dept: SURGERY | Facility: CLINIC | Age: 54
End: 2019-06-07
Payer: MEDICARE

## 2019-06-07 DIAGNOSIS — Z90.13 ABSENCE OF BREAST, ACQUIRED, BILATERAL: Primary | ICD-10-CM

## 2019-06-07 PROCEDURE — 99024 POSTOP FOLLOW-UP VISIT: CPT | Performed by: SURGERY

## 2019-06-07 NOTE — PROGRESS NOTES
Maranda Daniels is a 46year old female who presents today for a follow-up. She denies fever and chills. She denies nausea, vomiting, diarrhea or constipation. Physical Examination:  Breasts: Lateral breast flaps are warm and soft.   Incisions are clean

## 2019-06-07 NOTE — PROGRESS NOTES
Sw made revisions to [facility] paperwork for patients son, Bishop Brown at request of patient. Patient was provided with a copy and revisions were faxed to [phone].

## 2019-06-14 ENCOUNTER — TELEPHONE (OUTPATIENT)
Dept: SURGERY | Facility: CLINIC | Age: 54
End: 2019-06-14

## 2019-06-14 ENCOUNTER — OFFICE VISIT (OUTPATIENT)
Dept: SURGERY | Facility: CLINIC | Age: 54
End: 2019-06-14
Payer: MEDICARE

## 2019-06-14 DIAGNOSIS — Z90.13 ABSENCE OF BREAST, ACQUIRED, BILATERAL: Primary | ICD-10-CM

## 2019-06-14 PROCEDURE — 99024 POSTOP FOLLOW-UP VISIT: CPT | Performed by: PHYSICIAN ASSISTANT

## 2019-06-14 NOTE — PROGRESS NOTES
This is a 70-year-old female that is 3.5 weeks status post her bilateral breast reconstruction with TIMUR flap. She is here today for wound check, flap check, and drain evaluation. She denies any fevers or signs of infection.   She has been compliant with movement she will try an OTC enema. Her abdominal pain is somewhat relieved already after taking the stool softener. I explained that if this worsened despite a bowel movement, she should present to the ER. We will see her back in 3 weeks.  Currently she fe

## 2019-06-14 NOTE — TELEPHONE ENCOUNTER
Pt called on 6/13 afternoon c/o upper abdominal discomfort since am, reflux, constipation x 4 days, no fevers, on abdominal erythema. Pt counseled that likely related to constipation.  Recommended dulcolax, OTC enema and told to present to ED if no improvem

## 2019-06-19 ENCOUNTER — TELEPHONE (OUTPATIENT)
Dept: SURGERY | Facility: CLINIC | Age: 54
End: 2019-06-19

## 2019-07-05 PROCEDURE — 36415 COLL VENOUS BLD VENIPUNCTURE: CPT | Performed by: INTERNAL MEDICINE

## 2019-07-05 PROCEDURE — 84080 ASSAY ALKALINE PHOSPHATASES: CPT | Performed by: INTERNAL MEDICINE

## 2019-07-05 PROCEDURE — 84075 ASSAY ALKALINE PHOSPHATASE: CPT | Performed by: INTERNAL MEDICINE

## 2019-07-09 ENCOUNTER — OFFICE VISIT (OUTPATIENT)
Dept: SURGERY | Facility: CLINIC | Age: 54
End: 2019-07-09
Payer: MEDICARE

## 2019-07-09 DIAGNOSIS — Z90.13 ABSENCE OF BREAST, ACQUIRED, BILATERAL: ICD-10-CM

## 2019-07-09 DIAGNOSIS — S30.1XXA ABDOMINAL WALL SEROMA, INITIAL ENCOUNTER: Primary | ICD-10-CM

## 2019-07-09 PROCEDURE — 83516 IMMUNOASSAY NONANTIBODY: CPT | Performed by: INTERNAL MEDICINE

## 2019-07-09 PROCEDURE — 82390 ASSAY OF CERULOPLASMIN: CPT | Performed by: INTERNAL MEDICINE

## 2019-07-09 PROCEDURE — 99024 POSTOP FOLLOW-UP VISIT: CPT | Performed by: SURGERY

## 2019-07-09 PROCEDURE — 86706 HEP B SURFACE ANTIBODY: CPT | Performed by: INTERNAL MEDICINE

## 2019-07-09 PROCEDURE — 82103 ALPHA-1-ANTITRYPSIN TOTAL: CPT | Performed by: INTERNAL MEDICINE

## 2019-07-09 PROCEDURE — 86803 HEPATITIS C AB TEST: CPT | Performed by: INTERNAL MEDICINE

## 2019-07-09 NOTE — PROGRESS NOTES
Joyce Adan is a 46year old female who presents today for a follow-up. She reports that her bowel movements have normalized after she began a regimen of laxatives as per her PCP.   The patient also underwent an abdominal CT scan which showed a abdominal

## 2019-07-12 ENCOUNTER — HOSPITAL ENCOUNTER (OUTPATIENT)
Dept: ULTRASOUND IMAGING | Facility: HOSPITAL | Age: 54
Discharge: HOME | End: 2019-07-12
Attending: SURGERY
Payer: MEDICARE

## 2019-07-12 ENCOUNTER — APPOINTMENT (OUTPATIENT)
Dept: LAB | Facility: HOSPITAL | Age: 54
End: 2019-07-12
Attending: SURGERY
Payer: MEDICARE

## 2019-07-12 VITALS
TEMPERATURE: 97 F | RESPIRATION RATE: 14 BRPM | HEART RATE: 67 BPM | OXYGEN SATURATION: 95 % | SYSTOLIC BLOOD PRESSURE: 110 MMHG | DIASTOLIC BLOOD PRESSURE: 78 MMHG

## 2019-07-12 DIAGNOSIS — S30.1XXA ABDOMINAL WALL SEROMA, INITIAL ENCOUNTER: Primary | ICD-10-CM

## 2019-07-12 DIAGNOSIS — S30.1XXA ABDOMINAL WALL SEROMA, INITIAL ENCOUNTER: ICD-10-CM

## 2019-07-12 LAB
INR BLD: 0.87 (ref 0.9–1.1)
PSA SERPL DL<=0.01 NG/ML-MCNC: 12.2 SECONDS (ref 12.5–14.7)

## 2019-07-12 PROCEDURE — 36415 COLL VENOUS BLD VENIPUNCTURE: CPT

## 2019-07-12 PROCEDURE — 85610 PROTHROMBIN TIME: CPT

## 2019-07-12 PROCEDURE — 10030 IMG GID FLU COLL DRG SFT TIS: CPT | Performed by: SURGERY

## 2019-07-12 NOTE — PROCEDURES
[facility]  Procedure Note    Barb Prado Patient Status:  Outpatient    10/18/1965 MRN [id number]   Location [address] [place] [address] Attending Ever Florez MD   Hosp Day # 0 PCP Golden Alvarado MD     Procedure: us guided drainage abdominal

## 2019-07-15 ENCOUNTER — OFFICE VISIT (OUTPATIENT)
Dept: SURGERY | Facility: CLINIC | Age: 54
End: 2019-07-15
Payer: MEDICARE

## 2019-07-15 DIAGNOSIS — Z90.13 ABSENCE OF BREAST, ACQUIRED, BILATERAL: Primary | ICD-10-CM

## 2019-07-15 PROCEDURE — 99024 POSTOP FOLLOW-UP VISIT: CPT | Performed by: PHYSICIAN ASSISTANT

## 2019-07-22 ENCOUNTER — OFFICE VISIT (OUTPATIENT)
Dept: SURGERY | Facility: CLINIC | Age: 54
End: 2019-07-22
Payer: MEDICARE

## 2019-07-22 VITALS — TEMPERATURE: 99 F | RESPIRATION RATE: 16 BRPM

## 2019-07-22 DIAGNOSIS — Z90.13 ABSENCE OF BREAST, ACQUIRED, BILATERAL: Primary | ICD-10-CM

## 2019-07-22 PROCEDURE — 99024 POSTOP FOLLOW-UP VISIT: CPT | Performed by: PHYSICIAN ASSISTANT

## 2019-07-22 NOTE — PROGRESS NOTES
This is a 58-year-old female that is 9 weeks status post her bilateral breast reconstruction with TIMUR flap.  She is here today for wound check, flap check, and drain evaluation.  She denies any fevers or signs of infection.  She has been compliant with no

## 2019-07-25 ENCOUNTER — APPOINTMENT (OUTPATIENT)
Dept: CT IMAGING | Facility: HOSPITAL | Age: 54
End: 2019-07-25
Attending: EMERGENCY MEDICINE
Payer: MEDICARE

## 2019-07-25 ENCOUNTER — TELEPHONE (OUTPATIENT)
Dept: SURGERY | Facility: CLINIC | Age: 54
End: 2019-07-25

## 2019-07-25 ENCOUNTER — HOSPITAL ENCOUNTER (EMERGENCY)
Facility: HOSPITAL | Age: 54
Discharge: HOME | End: 2019-07-26
Attending: EMERGENCY MEDICINE
Payer: MEDICARE

## 2019-07-25 DIAGNOSIS — S30.1XXA ABDOMINAL WALL SEROMA, INITIAL ENCOUNTER: ICD-10-CM

## 2019-07-25 DIAGNOSIS — R10.9 ABDOMINAL PAIN OF UNKNOWN ETIOLOGY: Primary | ICD-10-CM

## 2019-07-25 LAB
ALBUMIN SERPL-MCNC: 3 G/DL (ref 3.4–5)
ALP LIVER SERPL-CCNC: 162 U/L (ref 41–108)
ALT SERPL-CCNC: 38 U/L (ref 13–56)
ANION GAP SERPL CALC-SCNC: 6 MMOL/L (ref 0–18)
APTT PPP: 28.3 SECONDS (ref 23.2–35.3)
AST SERPL-CCNC: 38 U/L (ref 15–37)
BASOPHILS # BLD AUTO: 0.06 X10(3) UL (ref 0–0.2)
BASOPHILS NFR BLD AUTO: 0.7 %
BILIRUB DIRECT SERPL-MCNC: <0.1 MG/DL (ref 0–0.2)
BILIRUB SERPL-MCNC: 0.2 MG/DL (ref 0.1–2)
BILIRUB UR QL: NEGATIVE
BUN BLD-MCNC: 10 MG/DL (ref 7–18)
BUN/CREAT SERPL: 12.7 (ref 10–20)
CALCIUM BLD-MCNC: 8.6 MG/DL (ref 8.5–10.1)
CHLORIDE SERPL-SCNC: 101 MMOL/L (ref 98–112)
CLARITY UR: CLEAR
CO2 SERPL-SCNC: 32 MMOL/L (ref 21–32)
COLOR UR: YELLOW
CREAT BLD-MCNC: 0.79 MG/DL (ref 0.55–1.02)
DEPRECATED RDW RBC AUTO: 45.5 FL (ref 35.1–46.3)
EOSINOPHIL # BLD AUTO: 0.21 X10(3) UL (ref 0–0.7)
EOSINOPHIL NFR BLD AUTO: 2.5 %
ERYTHROCYTE [DISTWIDTH] IN BLOOD BY AUTOMATED COUNT: 13.9 % (ref 11–15)
GLUCOSE BLD-MCNC: 103 MG/DL (ref 70–99)
GLUCOSE UR-MCNC: NEGATIVE MG/DL
HCT VFR BLD AUTO: 36 % (ref 35–48)
HGB BLD-MCNC: 11.6 G/DL (ref 12–16)
IMM GRANULOCYTES # BLD AUTO: 0.03 X10(3) UL (ref 0–1)
IMM GRANULOCYTES NFR BLD: 0.4 %
INR BLD: 0.94 (ref 0.9–1.2)
KETONES UR-MCNC: NEGATIVE MG/DL
LIPASE SERPL-CCNC: 142 U/L (ref 73–393)
LYMPHOCYTES # BLD AUTO: 2.2 X10(3) UL (ref 1–4)
LYMPHOCYTES NFR BLD AUTO: 26.7 %
M PROTEIN MFR SERPL ELPH: 6.9 G/DL (ref 6.4–8.2)
MCH RBC QN AUTO: 28.9 PG (ref 26–34)
MCHC RBC AUTO-ENTMCNC: 32.2 G/DL (ref 31–37)
MCV RBC AUTO: 89.8 FL (ref 80–100)
MONOCYTES # BLD AUTO: 0.56 X10(3) UL (ref 0.1–1)
MONOCYTES NFR BLD AUTO: 6.8 %
NEUTROPHILS # BLD AUTO: 5.19 X10 (3) UL (ref 1.5–7.7)
NEUTROPHILS # BLD AUTO: 5.19 X10(3) UL (ref 1.5–7.7)
NEUTROPHILS NFR BLD AUTO: 62.9 %
NITRITE UR QL STRIP.AUTO: NEGATIVE
OSMOLALITY SERPL CALC.SUM OF ELEC: 287 MOSM/KG (ref 275–295)
PH UR: 6 [PH] (ref 5–8)
PLATELET # BLD AUTO: 292 10(3)UL (ref 150–450)
POTASSIUM SERPL-SCNC: 3.2 MMOL/L (ref 3.5–5.1)
PROT UR-MCNC: NEGATIVE MG/DL
PROTHROMBIN TIME: 12.4 SECONDS (ref 11.8–14.5)
RBC # BLD AUTO: 4.01 X10(6)UL (ref 3.8–5.3)
RBC #/AREA URNS AUTO: 8 /HPF
SODIUM SERPL-SCNC: 139 MMOL/L (ref 136–145)
SP GR UR STRIP: >1.04 (ref 1–1.03)
UROBILINOGEN UR STRIP-ACNC: <2
VIT C UR-MCNC: NEGATIVE MG/DL
WBC # BLD AUTO: 8.3 X10(3) UL (ref 4–11)
WBC #/AREA URNS AUTO: 9 /HPF

## 2019-07-25 PROCEDURE — 74177 CT ABD & PELVIS W/CONTRAST: CPT | Performed by: EMERGENCY MEDICINE

## 2019-07-25 PROCEDURE — 80048 BASIC METABOLIC PNL TOTAL CA: CPT | Performed by: EMERGENCY MEDICINE

## 2019-07-25 PROCEDURE — 85025 COMPLETE CBC W/AUTO DIFF WBC: CPT | Performed by: EMERGENCY MEDICINE

## 2019-07-25 PROCEDURE — 87086 URINE CULTURE/COLONY COUNT: CPT | Performed by: EMERGENCY MEDICINE

## 2019-07-25 PROCEDURE — 85730 THROMBOPLASTIN TIME PARTIAL: CPT | Performed by: EMERGENCY MEDICINE

## 2019-07-25 PROCEDURE — 99284 EMERGENCY DEPT VISIT MOD MDM: CPT

## 2019-07-25 PROCEDURE — 83690 ASSAY OF LIPASE: CPT | Performed by: EMERGENCY MEDICINE

## 2019-07-25 PROCEDURE — 80076 HEPATIC FUNCTION PANEL: CPT | Performed by: EMERGENCY MEDICINE

## 2019-07-25 PROCEDURE — 81001 URINALYSIS AUTO W/SCOPE: CPT | Performed by: EMERGENCY MEDICINE

## 2019-07-25 PROCEDURE — 36415 COLL VENOUS BLD VENIPUNCTURE: CPT

## 2019-07-25 PROCEDURE — 85610 PROTHROMBIN TIME: CPT | Performed by: EMERGENCY MEDICINE

## 2019-07-25 NOTE — TELEPHONE ENCOUNTER
Pt notified per Dr. Hancock List, based on her symptoms she will need to be evaluated in the ED. Pt agreed and will come to [facility] AND [facility] ED for evaluation.

## 2019-07-26 ENCOUNTER — HOSPITAL ENCOUNTER (OUTPATIENT)
Dept: INTERVENTIONAL RADIOLOGY/VASCULAR | Facility: HOSPITAL | Age: 54
Discharge: HOME | End: 2019-07-26
Attending: RADIOLOGY | Admitting: RADIOLOGY
Payer: MEDICARE

## 2019-07-26 VITALS
DIASTOLIC BLOOD PRESSURE: 92 MMHG | TEMPERATURE: 98 F | RESPIRATION RATE: 20 BRPM | BODY MASS INDEX: 35 KG/M2 | OXYGEN SATURATION: 97 % | HEART RATE: 70 BPM | WEIGHT: 195 LBS | SYSTOLIC BLOOD PRESSURE: 156 MMHG

## 2019-07-26 VITALS — DIASTOLIC BLOOD PRESSURE: 88 MMHG | SYSTOLIC BLOOD PRESSURE: 136 MMHG

## 2019-07-26 DIAGNOSIS — S30.1XXD ABDOMINAL WALL SEROMA, SUBSEQUENT ENCOUNTER: Primary | ICD-10-CM

## 2019-07-26 DIAGNOSIS — S30.1XXD ABDOMINAL WALL SEROMA, SUBSEQUENT ENCOUNTER: ICD-10-CM

## 2019-07-26 PROCEDURE — 0H97X0Z DRAINAGE OF ABDOMEN SKIN WITH DRAINAGE DEVICE, EXTERNAL APPROACH: ICD-10-PCS | Performed by: RADIOLOGY

## 2019-07-26 PROCEDURE — 10030 IMG GID FLU COLL DRG SFT TIS: CPT

## 2019-07-26 RX ORDER — LIDOCAINE HYDROCHLORIDE 20 MG/ML
INJECTION, SOLUTION EPIDURAL; INFILTRATION; INTRACAUDAL; PERINEURAL
Status: COMPLETED
Start: 2019-07-26 | End: 2019-07-26

## 2019-07-26 RX ORDER — SODIUM CHLORIDE 9 MG/ML
INJECTION, SOLUTION INTRAVENOUS
Status: COMPLETED
Start: 2019-07-26 | End: 2019-07-26

## 2019-07-26 NOTE — INTERVAL H&P NOTE
The above referenced H&P was reviewed by Jeff Sherwood MD on 7/26/2019, the patient was examined and no significant changes have occurred in the patient's condition since the H&P was performed.   Risks, benefits, alternative treatments and consequences

## 2019-07-26 NOTE — PATIENT INSTRUCTIONS
After ER evaluation last night, patient does have an abdominal wall seroma. Order for U/S guided drain placement STAT placed today.  Patient notified and told to call [phone] to schedule

## 2019-07-26 NOTE — ED PROVIDER NOTES
Patient Seen in: [facility] AND [facility] Emergency Department    History   Patient presents with:  Abdomen/Flank Pain (GI/)    Stated Complaint: swollen    HPI    Patient presents the emergency department complaining of increasing abdominal fullness and swel OTHER SURGICAL HISTORY  10/2018    bilateral simple mastectomies - Dr. Jones Manrique   • SPINE SURGERY PROCEDURE UNLISTED      C3-T1 FUSIONS           Social History    Tobacco Use      Smoking status: Never Smoker      Smokeless tobacco: Never Used    [employer] the following components:    AST 38 (*)     Alkaline Phosphatase 162 (*)     Albumin 3.0 (*)     All other components within normal limits   URINALYSIS WITH CULTURE REFLEX - Abnormal; Notable for the following components:    Spec Gravity >1.040 (*)     Blo 7/26/2019 at 9:01             Result Date: 7/26/2019  CONCLUSION:  1. There is a large ventral abdominal wall fluid collection demonstrating peripheral enhancement. This may represent postoperative seroma, with or without superimposed infection.   2. Cholel

## 2019-07-26 NOTE — ED INITIAL ASSESSMENT (HPI)
Pt with hx of abdominal wall seroma presents to ER with abdominal distention s/p abdominal wall drain removal on Monday 7/22. +diarrhea and abdominal discomfort.  Was told to come into ED by MD.

## 2019-07-26 NOTE — ED NOTES
Patient c/o abd distention since drain removal 7.22.19, no redness or drainage to removal site which appears well healed.  Denies n/v/d or constipation

## 2019-07-26 NOTE — ED PROVIDER NOTES
Pt endorsed to me by Dr. Neal Donald. CT with large seroma. Discussed with Dr. Christiano Aly - recommending discharge with f/u as outpt for drainage. Discussed with patient - she is agreeable with discharge and close followup. Pt to be discharged home.       CT AP

## 2019-07-26 NOTE — PROCEDURES
[facility] HOSP - [facility]  Procedure Note    Bay Judd Patient Status:  Outpatient in a Bed    10/18/1965 MRN [id number]   Location [facility] Attending Dandre Portillo MD   Hosp Day # 0 PCP Jo Ann Goldstein MD

## 2019-08-01 ENCOUNTER — HOSPITAL ENCOUNTER (OUTPATIENT)
Dept: INTERVENTIONAL RADIOLOGY/VASCULAR | Facility: HOSPITAL | Age: 54
Discharge: HOME | End: 2019-08-01
Attending: RADIOLOGY | Admitting: RADIOLOGY
Payer: MEDICARE

## 2019-08-01 VITALS
HEART RATE: 64 BPM | WEIGHT: 195 LBS | RESPIRATION RATE: 15 BRPM | OXYGEN SATURATION: 96 % | DIASTOLIC BLOOD PRESSURE: 91 MMHG | BODY MASS INDEX: 34.55 KG/M2 | SYSTOLIC BLOOD PRESSURE: 117 MMHG | HEIGHT: 63 IN

## 2019-08-01 PROCEDURE — 49185 SCLEROTX FLUID COLLECTION: CPT

## 2019-08-01 PROCEDURE — 0W9F3ZZ DRAINAGE OF ABDOMINAL WALL, PERCUTANEOUS APPROACH: ICD-10-PCS | Performed by: RADIOLOGY

## 2019-08-01 PROCEDURE — 10030 IMG GID FLU COLL DRG SFT TIS: CPT

## 2019-08-14 ENCOUNTER — HOSPITAL ENCOUNTER (OUTPATIENT)
Dept: INTERVENTIONAL RADIOLOGY/VASCULAR | Facility: HOSPITAL | Age: 54
Discharge: HOME | End: 2019-08-14
Attending: RADIOLOGY | Admitting: RADIOLOGY
Payer: MEDICARE

## 2019-08-14 VITALS
DIASTOLIC BLOOD PRESSURE: 94 MMHG | HEART RATE: 74 BPM | BODY MASS INDEX: 36.8 KG/M2 | WEIGHT: 200 LBS | RESPIRATION RATE: 21 BRPM | HEIGHT: 62 IN | SYSTOLIC BLOOD PRESSURE: 124 MMHG | OXYGEN SATURATION: 94 %

## 2019-08-14 PROCEDURE — 49423 EXCHANGE DRAINAGE CATHETER: CPT

## 2019-08-14 PROCEDURE — 49185 SCLEROTX FLUID COLLECTION: CPT

## 2019-08-14 PROCEDURE — 75984 XRAY CONTROL CATHETER CHANGE: CPT

## 2019-08-14 RX ORDER — SODIUM CHLORIDE 9 MG/ML
INJECTION, SOLUTION INTRAVENOUS
Status: COMPLETED
Start: 2019-08-14 | End: 2019-08-14

## 2019-08-14 RX ORDER — LIDOCAINE HYDROCHLORIDE 20 MG/ML
INJECTION, SOLUTION EPIDURAL; INFILTRATION; INTRACAUDAL; PERINEURAL
Status: COMPLETED
Start: 2019-08-14 | End: 2019-08-14

## 2019-08-14 NOTE — PROGRESS NOTES
Pt's vss while in recovery, no sedation given. Betadine aspirated prior to discharge and tube connected to the bulb suction.

## 2019-08-14 NOTE — H&P
[address] [facility] Patient Status:  Outpatient    10/18/1965 MRN [id number]   Location [facility] Attending Kevin Joseph MD   Hosp Day # 0 PCP Mignon Green MD     Admitting Smoking status: Never Smoker      Smokeless tobacco: Never Used    Alcohol use: No      Alcohol/week: 0.0 standard drinks      Frequency: Never      Drinks per session: 1 or 2      Binge frequency: Never       Family History:  Family History   Problem Rela cavity.     Rosalee Britton MD  8/14/2019  11:44 AM

## 2019-08-16 ENCOUNTER — HOSPITAL ENCOUNTER (OUTPATIENT)
Dept: INTERVENTIONAL RADIOLOGY/VASCULAR | Facility: HOSPITAL | Age: 54
Discharge: HOME | End: 2019-08-16
Attending: RADIOLOGY | Admitting: RADIOLOGY
Payer: MEDICARE

## 2019-08-16 VITALS
OXYGEN SATURATION: 98 % | RESPIRATION RATE: 14 BRPM | DIASTOLIC BLOOD PRESSURE: 84 MMHG | SYSTOLIC BLOOD PRESSURE: 146 MMHG | HEART RATE: 79 BPM

## 2019-08-16 DIAGNOSIS — S30.1XXD ABDOMINAL WALL SEROMA, SUBSEQUENT ENCOUNTER: ICD-10-CM

## 2019-08-16 PROCEDURE — 87075 CULTR BACTERIA EXCEPT BLOOD: CPT | Performed by: RADIOLOGY

## 2019-08-16 PROCEDURE — 87205 SMEAR GRAM STAIN: CPT | Performed by: RADIOLOGY

## 2019-08-16 PROCEDURE — 49424 ASSESS CYST CONTRAST INJECT: CPT

## 2019-08-16 PROCEDURE — 76080 X-RAY EXAM OF FISTULA: CPT

## 2019-08-16 PROCEDURE — BW11YZZ FLUOROSCOPY OF ABDOMEN AND PELVIS USING OTHER CONTRAST: ICD-10-PCS | Performed by: RADIOLOGY

## 2019-08-16 PROCEDURE — 87070 CULTURE OTHR SPECIMN AEROBIC: CPT | Performed by: RADIOLOGY

## 2019-08-16 RX ORDER — CEPHALEXIN 500 MG/1
500 CAPSULE ORAL 4 TIMES DAILY
Qty: 28 CAPSULE | Refills: 0 | Status: SHIPPED | OUTPATIENT
Start: 2019-08-16 | End: 2019-08-23

## 2019-08-16 RX ORDER — CETIRIZINE HYDROCHLORIDE 10 MG/1
10 TABLET ORAL DAILY
COMMUNITY

## 2019-08-16 RX ORDER — ACETAMINOPHEN 500 MG
1000 TABLET ORAL EVERY 6 HOURS PRN
Status: ON HOLD | COMMUNITY
End: 2019-11-14

## 2019-08-16 NOTE — PROCEDURES
[facility] HOSP - [facility]  Procedure Note    Yue Helen Patient Status:  Outpatient in a Bed    10/18/1965 MRN [id number]   Location [facility] Attending Etta Lindsey MD   Hosp Day # 0 PCP Sbai Mckinney MD

## 2019-08-20 ENCOUNTER — OFFICE VISIT (OUTPATIENT)
Dept: SURGERY | Facility: CLINIC | Age: 54
End: 2019-08-20
Payer: MEDICARE

## 2019-08-20 DIAGNOSIS — Z90.13 ABSENCE OF BREAST, ACQUIRED, BILATERAL: Primary | ICD-10-CM

## 2019-08-20 PROCEDURE — 99024 POSTOP FOLLOW-UP VISIT: CPT | Performed by: SURGERY

## 2019-08-20 NOTE — PROGRESS NOTES
Felton Hurd is a 46year old female who presents today for a follow-up. She was seen by Dr. Luis Enrique Wilhelm on 8/16 and started on oral Keflex as she was having complaints of abdominal fullness.   Cultures were taken from the aspirate and they have shown no grow

## 2019-08-21 VITALS — WEIGHT: 173.06 LBS | BODY MASS INDEX: 32 KG/M2

## 2019-08-22 ENCOUNTER — HOSPITAL ENCOUNTER (OUTPATIENT)
Dept: INTERVENTIONAL RADIOLOGY/VASCULAR | Facility: HOSPITAL | Age: 54
Discharge: HOME | End: 2019-08-22
Attending: RADIOLOGY | Admitting: RADIOLOGY
Payer: MEDICARE

## 2019-08-22 PROCEDURE — 3E00XTZ INTRODUCTION OF DESTRUCTIVE AGENT INTO SKIN AND MUCOUS MEMBRANES, EXTERNAL APPROACH: ICD-10-PCS | Performed by: RADIOLOGY

## 2019-08-22 PROCEDURE — 49185 SCLEROTX FLUID COLLECTION: CPT

## 2019-08-30 ENCOUNTER — HOSPITAL ENCOUNTER (OUTPATIENT)
Dept: INTERVENTIONAL RADIOLOGY/VASCULAR | Facility: HOSPITAL | Age: 54
Discharge: HOME | End: 2019-08-30
Attending: RADIOLOGY | Admitting: RADIOLOGY
Payer: MEDICARE

## 2019-08-30 VITALS
SYSTOLIC BLOOD PRESSURE: 122 MMHG | OXYGEN SATURATION: 96 % | DIASTOLIC BLOOD PRESSURE: 83 MMHG | RESPIRATION RATE: 20 BRPM | HEART RATE: 80 BPM

## 2019-08-30 PROCEDURE — 49424 ASSESS CYST CONTRAST INJECT: CPT

## 2019-08-30 PROCEDURE — 49422 REMOVE TUNNELED IP CATH: CPT

## 2019-08-30 PROCEDURE — BW40ZZZ ULTRASONOGRAPHY OF ABDOMEN: ICD-10-PCS | Performed by: RADIOLOGY

## 2019-08-30 PROCEDURE — 0WPFX0Z REMOVAL OF DRAINAGE DEVICE FROM ABDOMINAL WALL, EXTERNAL APPROACH: ICD-10-PCS | Performed by: RADIOLOGY

## 2019-08-30 PROCEDURE — 76080 X-RAY EXAM OF FISTULA: CPT

## 2019-08-30 NOTE — PROGRESS NOTES
Patient awake and alert, drain removed , no sedation given, bandaid in place, instructions given, states understanding.

## 2019-08-30 NOTE — PROGRESS NOTES
Drain check at bedside with ultrasound. Drain removed by Dr. Harlan Lay. Bandage applied. Report given to [facility]. VS stable.

## 2019-08-30 NOTE — PROCEDURES
[facility] HOSP - [facility]  Procedure Note    Josephjosephine Barrios Patient Status:  Outpatient in a Bed    10/18/1965 MRN [id number]   Location [facility] Attending Alana Vega MD   Hosp Day # 0 PCP Karime Walters MD     Proced

## 2019-08-30 NOTE — H&P
[address] [facility] Patient Status:  Outpatient in a Bed    10/18/1965 MRN [id number]   Location [facility] Attending Belen Harris MD   Hosp Day # 0 PCP Samuel Gentile MD     A tobacco: Never Used    Alcohol use: No      Alcohol/week: 0.0 standard drinks      Frequency: Never      Drinks per session: 1 or 2      Binge frequency: Never       Family History:  Family History   Problem Relation Age of Onset   • Heart Disease Father

## 2019-09-03 ENCOUNTER — OFFICE VISIT (OUTPATIENT)
Dept: SURGERY | Facility: CLINIC | Age: 54
End: 2019-09-03
Payer: MEDICARE

## 2019-09-03 DIAGNOSIS — Z90.13 ABSENCE OF BREAST, ACQUIRED, BILATERAL: Primary | ICD-10-CM

## 2019-09-03 PROCEDURE — 99212 OFFICE O/P EST SF 10 MIN: CPT | Performed by: SURGERY

## 2019-09-03 NOTE — PROGRESS NOTES
Surgery and wash instructions verbally reviewed with the patient and written instructions were also provided. The patient understands the need to obtain medical clearance for this procedure and plans to see Dr. Anel Cooley for this.      Informed consent for the

## 2019-09-03 NOTE — PATIENT INSTRUCTIONS
Surgeon: Dr. Eric Batista      Tel:  [phone]    Fax: [phone]     Surgery/Procedure: Revision of bilateral reconstructed breasts with autologous fat-grafting, 3 hours, general anesthesia        Hospital:  [facility]: [place] [address] Nirav Zainab

## 2019-09-03 NOTE — PROGRESS NOTES
Benson Christine is a 46year old female who presents today for a follow-up. She denies fever and chills. She denies nausea, vomiting, diarrhea or constipation. She underwent abdominal drain removal on Friday and denies any recurrent abdominal symptoms.   She co

## 2019-09-18 DIAGNOSIS — Z90.10 ACQUIRED ABSENCE OF BREAST, UNSPECIFIED LATERALITY: Primary | ICD-10-CM

## 2019-10-14 ENCOUNTER — HOSPITAL ENCOUNTER (EMERGENCY)
Facility: HOSPITAL | Age: 54
Discharge: HOME | End: 2019-10-14
Payer: MEDICARE

## 2019-10-14 ENCOUNTER — APPOINTMENT (OUTPATIENT)
Dept: GENERAL RADIOLOGY | Facility: HOSPITAL | Age: 54
End: 2019-10-14
Payer: MEDICARE

## 2019-10-14 VITALS
OXYGEN SATURATION: 98 % | SYSTOLIC BLOOD PRESSURE: 129 MMHG | RESPIRATION RATE: 19 BRPM | WEIGHT: 200 LBS | BODY MASS INDEX: 36.8 KG/M2 | HEART RATE: 75 BPM | TEMPERATURE: 98 F | HEIGHT: 62 IN | DIASTOLIC BLOOD PRESSURE: 78 MMHG

## 2019-10-14 DIAGNOSIS — M25.562 ACUTE PAIN OF LEFT KNEE: Primary | ICD-10-CM

## 2019-10-14 PROCEDURE — 99283 EMERGENCY DEPT VISIT LOW MDM: CPT | Performed by: EMERGENCY MEDICINE

## 2019-10-14 PROCEDURE — 73562 X-RAY EXAM OF KNEE 3: CPT

## 2019-10-14 PROCEDURE — 96372 THER/PROPH/DIAG INJ SC/IM: CPT | Performed by: EMERGENCY MEDICINE

## 2019-10-14 RX ORDER — KETOROLAC TROMETHAMINE 10 MG/1
10 TABLET, FILM COATED ORAL EVERY 6 HOURS PRN
Qty: 20 TABLET | Refills: 0 | Status: SHIPPED | OUTPATIENT
Start: 2019-10-14 | End: 2019-10-21

## 2019-10-14 RX ORDER — KETOROLAC TROMETHAMINE 30 MG/ML
60 INJECTION, SOLUTION INTRAMUSCULAR; INTRAVENOUS ONCE
Status: COMPLETED | OUTPATIENT
Start: 2019-10-14 | End: 2019-10-14

## 2019-10-14 NOTE — ED PROVIDER NOTES
Patient Seen in: [facility] Emergency Department      History   Patient presents with:  Pain (neurologic)    Stated Complaint: left knee pain, no injury    HPI    Patient presents with left knee pain.   The patient states that she has bad knees and n Performed by Leona Hall MD at [facility] MAIN OR   • BREAST SIMPLE MASTECTOMY Bilateral 10/15/2018    Performed by Mylene Mar MD at [address]   • ENDOMETRIAL ABLATION     • FRACTURE SURGERY Left     ELBOW   • HYSTERECTOMY  2009    Partial    • LAPAROSCOP COMPARISON:  None. INDICATIONS:  PATIENT STATED HISTORY: (As transcribed by Technologist)  Left knee pain today, unable to bear weight or straighten left leg. No injury. Feels painful pulling on anterior knee cap.      FINDINGS:  BONES:  Frontal projection Pain.  Qty: 20 tablet Refills: 0    Famotidine-Ca Carb-Mag Hydrox -165 MG Oral Chew Tab  Chew 1 tablet by mouth 2 (two) times daily.   Qty: 50 tablet Refills: 0    LOSARTAN POTASSIUM-HCTZ 50-12.5 MG Oral Tab  TAKE 1 TABLET BY MOUTH EVERY DAY  Qty: 90

## 2019-10-14 NOTE — ED NOTES
Patient asking Salvador Grewalu is this all that's going to happen your just going to give me a pain shot and I follow up with ortho? \" RN informed patient that she will check with the doctor to see what the plan was.

## 2019-10-14 NOTE — ED INITIAL ASSESSMENT (HPI)
C/o L knee pain that she needs a replacement for. This am woke up with increased pain and unable to bear weight. Pain radiates downward to the anterior aspect of the lower leg.

## 2019-11-14 ENCOUNTER — ANESTHESIA (OUTPATIENT)
Dept: SURGERY | Facility: HOSPITAL | Age: 54
End: 2019-11-14
Payer: MEDICARE

## 2019-11-14 ENCOUNTER — HOSPITAL ENCOUNTER (OUTPATIENT)
Facility: HOSPITAL | Age: 54
Setting detail: HOSPITAL OUTPATIENT SURGERY
Discharge: HOME | End: 2019-11-14
Attending: SURGERY | Admitting: SURGERY
Payer: MEDICARE

## 2019-11-14 ENCOUNTER — ANESTHESIA EVENT (OUTPATIENT)
Dept: SURGERY | Facility: HOSPITAL | Age: 54
End: 2019-11-14
Payer: MEDICARE

## 2019-11-14 VITALS
WEIGHT: 201.38 LBS | OXYGEN SATURATION: 91 % | DIASTOLIC BLOOD PRESSURE: 79 MMHG | HEART RATE: 84 BPM | SYSTOLIC BLOOD PRESSURE: 125 MMHG | BODY MASS INDEX: 37.06 KG/M2 | TEMPERATURE: 98 F | HEIGHT: 62 IN | RESPIRATION RATE: 14 BRPM

## 2019-11-14 DIAGNOSIS — Z90.10 ACQUIRED ABSENCE OF BREAST, UNSPECIFIED LATERALITY: ICD-10-CM

## 2019-11-14 PROCEDURE — 0JD73ZZ EXTRACTION OF BACK SUBCUTANEOUS TISSUE AND FASCIA, PERCUTANEOUS APPROACH: ICD-10-PCS | Performed by: SURGERY

## 2019-11-14 PROCEDURE — 0JQ80ZZ REPAIR ABDOMEN SUBCUTANEOUS TISSUE AND FASCIA, OPEN APPROACH: ICD-10-PCS | Performed by: SURGERY

## 2019-11-14 PROCEDURE — 88305 TISSUE EXAM BY PATHOLOGIST: CPT | Performed by: SURGERY

## 2019-11-14 PROCEDURE — 0HUV37Z SUPPLEMENT BILATERAL BREAST WITH AUTOLOGOUS TISSUE SUBSTITUTE, PERCUTANEOUS APPROACH: ICD-10-PCS | Performed by: SURGERY

## 2019-11-14 PROCEDURE — 0HB7XZZ EXCISION OF ABDOMEN SKIN, EXTERNAL APPROACH: ICD-10-PCS | Performed by: SURGERY

## 2019-11-14 PROCEDURE — 0HBV0ZZ EXCISION OF BILATERAL BREAST, OPEN APPROACH: ICD-10-PCS | Performed by: SURGERY

## 2019-11-14 RX ORDER — HYDROCODONE BITARTRATE AND ACETAMINOPHEN 5; 325 MG/1; MG/1
2 TABLET ORAL AS NEEDED
Status: DISCONTINUED | OUTPATIENT
Start: 2019-11-14 | End: 2019-11-14

## 2019-11-14 RX ORDER — MORPHINE SULFATE 2 MG/ML
2 INJECTION, SOLUTION INTRAMUSCULAR; INTRAVENOUS EVERY 10 MIN PRN
Status: DISCONTINUED | OUTPATIENT
Start: 2019-11-14 | End: 2019-11-14

## 2019-11-14 RX ORDER — LIDOCAINE HYDROCHLORIDE 10 MG/ML
INJECTION, SOLUTION EPIDURAL; INFILTRATION; INTRACAUDAL; PERINEURAL AS NEEDED
Status: DISCONTINUED | OUTPATIENT
Start: 2019-11-14 | End: 2019-11-14 | Stop reason: SURG

## 2019-11-14 RX ORDER — HALOPERIDOL 5 MG/ML
0.25 INJECTION INTRAMUSCULAR ONCE AS NEEDED
Status: DISCONTINUED | OUTPATIENT
Start: 2019-11-14 | End: 2019-11-14

## 2019-11-14 RX ORDER — MORPHINE SULFATE 10 MG/ML
6 INJECTION, SOLUTION INTRAMUSCULAR; INTRAVENOUS EVERY 10 MIN PRN
Status: DISCONTINUED | OUTPATIENT
Start: 2019-11-14 | End: 2019-11-14

## 2019-11-14 RX ORDER — MIDAZOLAM HYDROCHLORIDE 1 MG/ML
INJECTION INTRAMUSCULAR; INTRAVENOUS AS NEEDED
Status: DISCONTINUED | OUTPATIENT
Start: 2019-11-14 | End: 2019-11-14 | Stop reason: SURG

## 2019-11-14 RX ORDER — HYDROMORPHONE HYDROCHLORIDE 1 MG/ML
0.6 INJECTION, SOLUTION INTRAMUSCULAR; INTRAVENOUS; SUBCUTANEOUS EVERY 5 MIN PRN
Status: DISCONTINUED | OUTPATIENT
Start: 2019-11-14 | End: 2019-11-14

## 2019-11-14 RX ORDER — HYDROCODONE BITARTRATE AND ACETAMINOPHEN 5; 325 MG/1; MG/1
1 TABLET ORAL AS NEEDED
Status: DISCONTINUED | OUTPATIENT
Start: 2019-11-14 | End: 2019-11-14

## 2019-11-14 RX ORDER — ONDANSETRON 2 MG/ML
4 INJECTION INTRAMUSCULAR; INTRAVENOUS ONCE AS NEEDED
Status: DISCONTINUED | OUTPATIENT
Start: 2019-11-14 | End: 2019-11-14

## 2019-11-14 RX ORDER — ACETAMINOPHEN 500 MG
1000 TABLET ORAL ONCE
Status: COMPLETED | OUTPATIENT
Start: 2019-11-14 | End: 2019-11-14

## 2019-11-14 RX ORDER — FAMOTIDINE 20 MG/1
20 TABLET ORAL ONCE
Status: DISCONTINUED | OUTPATIENT
Start: 2019-11-14 | End: 2019-11-14 | Stop reason: HOSPADM

## 2019-11-14 RX ORDER — LIDOCAINE HYDROCHLORIDE AND EPINEPHRINE 10; 10 MG/ML; UG/ML
INJECTION, SOLUTION INFILTRATION; PERINEURAL AS NEEDED
Status: DISCONTINUED | OUTPATIENT
Start: 2019-11-14 | End: 2019-11-14 | Stop reason: HOSPADM

## 2019-11-14 RX ORDER — SODIUM CHLORIDE, SODIUM LACTATE, POTASSIUM CHLORIDE, CALCIUM CHLORIDE 600; 310; 30; 20 MG/100ML; MG/100ML; MG/100ML; MG/100ML
INJECTION, SOLUTION INTRAVENOUS CONTINUOUS
Status: DISCONTINUED | OUTPATIENT
Start: 2019-11-14 | End: 2019-11-14

## 2019-11-14 RX ORDER — MORPHINE SULFATE 4 MG/ML
4 INJECTION, SOLUTION INTRAMUSCULAR; INTRAVENOUS EVERY 10 MIN PRN
Status: DISCONTINUED | OUTPATIENT
Start: 2019-11-14 | End: 2019-11-14

## 2019-11-14 RX ORDER — NEOSTIGMINE METHYLSULFATE 0.5 MG/ML
INJECTION INTRAVENOUS AS NEEDED
Status: DISCONTINUED | OUTPATIENT
Start: 2019-11-14 | End: 2019-11-14 | Stop reason: SURG

## 2019-11-14 RX ORDER — SCOLOPAMINE TRANSDERMAL SYSTEM 1 MG/1
1 PATCH, EXTENDED RELEASE TRANSDERMAL
Status: DISCONTINUED | OUTPATIENT
Start: 2019-11-14 | End: 2019-11-17 | Stop reason: HOSPADM

## 2019-11-14 RX ORDER — HYDROMORPHONE HYDROCHLORIDE 1 MG/ML
INJECTION, SOLUTION INTRAMUSCULAR; INTRAVENOUS; SUBCUTANEOUS AS NEEDED
Status: DISCONTINUED | OUTPATIENT
Start: 2019-11-14 | End: 2019-11-14 | Stop reason: SURG

## 2019-11-14 RX ORDER — GLYCOPYRROLATE 0.2 MG/ML
INJECTION INTRAMUSCULAR; INTRAVENOUS AS NEEDED
Status: DISCONTINUED | OUTPATIENT
Start: 2019-11-14 | End: 2019-11-14 | Stop reason: SURG

## 2019-11-14 RX ORDER — ONDANSETRON 4 MG/1
4 TABLET, ORALLY DISINTEGRATING ORAL EVERY 8 HOURS PRN
Qty: 10 TABLET | Refills: 0 | Status: SHIPPED | OUTPATIENT
Start: 2019-11-14 | End: 2019-12-13 | Stop reason: ALTCHOICE

## 2019-11-14 RX ORDER — HYDROCODONE BITARTRATE AND ACETAMINOPHEN 5; 325 MG/1; MG/1
1-2 TABLET ORAL EVERY 4 HOURS PRN
Qty: 25 TABLET | Refills: 0 | Status: SHIPPED | OUTPATIENT
Start: 2019-11-14 | End: 2020-07-16 | Stop reason: ALTCHOICE

## 2019-11-14 RX ORDER — PROCHLORPERAZINE EDISYLATE 5 MG/ML
5 INJECTION INTRAMUSCULAR; INTRAVENOUS ONCE AS NEEDED
Status: DISCONTINUED | OUTPATIENT
Start: 2019-11-14 | End: 2019-11-14

## 2019-11-14 RX ORDER — NALOXONE HYDROCHLORIDE 0.4 MG/ML
80 INJECTION, SOLUTION INTRAMUSCULAR; INTRAVENOUS; SUBCUTANEOUS AS NEEDED
Status: DISCONTINUED | OUTPATIENT
Start: 2019-11-14 | End: 2019-11-14

## 2019-11-14 RX ORDER — ROCURONIUM BROMIDE 10 MG/ML
INJECTION, SOLUTION INTRAVENOUS AS NEEDED
Status: DISCONTINUED | OUTPATIENT
Start: 2019-11-14 | End: 2019-11-14 | Stop reason: SURG

## 2019-11-14 RX ORDER — ONDANSETRON 2 MG/ML
INJECTION INTRAMUSCULAR; INTRAVENOUS AS NEEDED
Status: DISCONTINUED | OUTPATIENT
Start: 2019-11-14 | End: 2019-11-14 | Stop reason: SURG

## 2019-11-14 RX ORDER — HYDROMORPHONE HYDROCHLORIDE 1 MG/ML
0.4 INJECTION, SOLUTION INTRAMUSCULAR; INTRAVENOUS; SUBCUTANEOUS EVERY 5 MIN PRN
Status: DISCONTINUED | OUTPATIENT
Start: 2019-11-14 | End: 2019-11-14

## 2019-11-14 RX ORDER — DOCUSATE SODIUM 100 MG/1
100 CAPSULE, LIQUID FILLED ORAL 2 TIMES DAILY
Qty: 30 CAPSULE | Refills: 1 | Status: SHIPPED | OUTPATIENT
Start: 2019-11-14 | End: 2019-12-13 | Stop reason: ALTCHOICE

## 2019-11-14 RX ORDER — CEFAZOLIN SODIUM/WATER 2 G/20 ML
2 SYRINGE (ML) INTRAVENOUS ONCE
Status: COMPLETED | OUTPATIENT
Start: 2019-11-14 | End: 2019-11-14

## 2019-11-14 RX ORDER — DEXAMETHASONE SODIUM PHOSPHATE 4 MG/ML
VIAL (ML) INJECTION AS NEEDED
Status: DISCONTINUED | OUTPATIENT
Start: 2019-11-14 | End: 2019-11-14 | Stop reason: SURG

## 2019-11-14 RX ORDER — HYDROMORPHONE HYDROCHLORIDE 1 MG/ML
0.2 INJECTION, SOLUTION INTRAMUSCULAR; INTRAVENOUS; SUBCUTANEOUS EVERY 5 MIN PRN
Status: DISCONTINUED | OUTPATIENT
Start: 2019-11-14 | End: 2019-11-14

## 2019-11-14 RX ADMIN — GLYCOPYRROLATE 0.4 MG: 0.2 INJECTION INTRAMUSCULAR; INTRAVENOUS at 10:36:00

## 2019-11-14 RX ADMIN — ONDANSETRON 4 MG: 2 INJECTION INTRAMUSCULAR; INTRAVENOUS at 10:23:00

## 2019-11-14 RX ADMIN — ROCURONIUM BROMIDE 50 MG: 10 INJECTION, SOLUTION INTRAVENOUS at 07:45:00

## 2019-11-14 RX ADMIN — DEXAMETHASONE SODIUM PHOSPHATE 8 MG: 4 MG/ML VIAL (ML) INJECTION at 07:54:00

## 2019-11-14 RX ADMIN — SODIUM CHLORIDE, SODIUM LACTATE, POTASSIUM CHLORIDE, CALCIUM CHLORIDE: 600; 310; 30; 20 INJECTION, SOLUTION INTRAVENOUS at 10:46:00

## 2019-11-14 RX ADMIN — HYDROMORPHONE HYDROCHLORIDE 0.5 MG: 1 INJECTION, SOLUTION INTRAMUSCULAR; INTRAVENOUS; SUBCUTANEOUS at 07:45:00

## 2019-11-14 RX ADMIN — CEFAZOLIN SODIUM/WATER 2 G: 2 G/20 ML SYRINGE (ML) INTRAVENOUS at 07:57:00

## 2019-11-14 RX ADMIN — MIDAZOLAM HYDROCHLORIDE 2 MG: 1 INJECTION INTRAMUSCULAR; INTRAVENOUS at 07:30:00

## 2019-11-14 RX ADMIN — LIDOCAINE HYDROCHLORIDE 50 MG: 10 INJECTION, SOLUTION EPIDURAL; INFILTRATION; INTRACAUDAL; PERINEURAL at 07:44:00

## 2019-11-14 RX ADMIN — NEOSTIGMINE METHYLSULFATE 3 MG: 0.5 INJECTION INTRAVENOUS at 10:36:00

## 2019-11-14 NOTE — ANESTHESIA PROCEDURE NOTES
Airway  Date/Time: 11/14/2019 7:49 AM  Urgency: Elective    Airway not difficult    General Information and Staff    Patient location during procedure: OR  Anesthesiologist: Princess Graves DO  Performed: CRNA     Indications and Patient Condition  Indicati

## 2019-11-14 NOTE — ANESTHESIA PROCEDURE NOTES
Peripheral IV  Date/Time: 11/14/2019 7:38 AM  Inserted by: Sherice Zamorano DO    Placement  Needle size: 18 G  Laterality: right  Location: hand  Site prep: alcohol  Technique: anatomical landmarks  Attempts: 1

## 2019-11-14 NOTE — BRIEF OP NOTE
Pre-Operative Diagnosis: Acquired absence of breast, unspecified laterality [Z90.10]     Post-Operative Diagnosis: Acquired absence of breast, unspecified laterality [Z90.10]      Procedure Performed:   Procedure(s):  Revision of bilateral reconstructed br

## 2019-11-14 NOTE — ANESTHESIA POSTPROCEDURE EVALUATION
Patient: Nereida Contreras    Procedure Summary     Date:  11/14/19 Room / Location:  [facility] OR 01 / [facility] OR    Anesthesia Start:  7999 Anesthesia Stop:  9430    Procedures:       BREAST RECONSTRUCTION SECOND STAGE/ REVISION (Bilateral Breast)      FAT GRA

## 2019-11-14 NOTE — H&P
No change in H&P from 10/18.  We reviewed the plan for skin envelope reduction via a lateral extension of the patient's breast scar, concomitant fat grafting the upper pole of bilateral breasts, and revision of a small dog-ear at the left side of the abdomi

## 2019-11-14 NOTE — ANESTHESIA PREPROCEDURE EVALUATION
Anesthesia PreOp Note    HPI:     Nereida Contreras is a 53year old female who presents for preoperative consultation requested by: Henny Mcintyre MD    Date of Surgery: 11/14/2019    Procedure(s):  BREAST RECONSTRUCTION SECOND STAGE/ REVISION  FAT GRAFTING  In • Urge incontinence of urine    • Urine incontinence    • Visual impairment     readers       Past Surgical History:   Procedure Laterality Date   • ARTHROSCOPY OF JOINT UNLISTED Right     KNEE   • ARTHROSCOPY, ANKLE, SURGICAL; W/ANKLE ARTHRODESIS     • BR Metoclopramide HCl 10 MG Oral Tab, Take 1 tablet (10 mg total) by mouth every 8 (eight) hours as needed. , Disp: 20 tablet, Rfl: 0, Past Month at Unknown time  aspirin 81 MG Oral Tab EC, Take 1 tablet (81 mg total) by mouth daily.  (Patient taking differentl • Cancer Sister         breast    • Seizure Disorder Sister    • Heart Disease Brother    • Hypertension Brother    • Seizure Disorder Brother    • Heart Attack Brother 52   • Other (Other) Sister         radial scars   • Ovarian Cancer Neg      Social His Sleep Concern: Not Asked        Stress Concern: Not Asked        Weight Concern: Not Asked        Special Diet: Not Asked        Back Care: Not Asked        Exercise: Yes        Bike Helmet: Not Asked        Seat Belt: Yes        Self-Exams: Not Aske I have informed Hermila Lust and/or legal guardian or family member of the nature of the anesthetic plan, benefits, risks including possible dental damage if relevant, major complications, and any alternative forms of anesthetic management.    All of the pat

## 2019-11-15 NOTE — OPERATIVE REPORT
[facility]    PATIENT'S NAME: Nathalia AHUJA   ATTENDING PHYSICIAN: Reg Sarah MD   OPERATING PHYSICIAN: Reg Sarah MD   PATIENT ACCOUNT#:   [id number]    LOCATION:  [facility]  MEDICAL RECORD #:   [id number]       DATE OF BIR intubation. The arms were placed abducted on padded arm boards and loosely secured with Kerlix. The chest was prepped and draped sterilely. The margins of the skin paddles of both breasts were infiltrated with 1% lidocaine with epinephrine.   The lateral deepithelialized. The skin resections laterally were then performed with a scalpel and electrocautery, and the skin was sent for permanent pathologic analysis of the right and left breast skin, respectively.   The patient was then replaced in the upright p

## 2019-11-25 ENCOUNTER — OFFICE VISIT (OUTPATIENT)
Dept: SURGERY | Facility: CLINIC | Age: 54
End: 2019-11-25
Payer: MEDICARE

## 2019-11-25 DIAGNOSIS — Z90.13 ABSENCE OF BREAST, ACQUIRED, BILATERAL: Primary | ICD-10-CM

## 2019-11-25 DIAGNOSIS — R10.9 ABDOMINAL PAIN OF UNKNOWN ETIOLOGY: ICD-10-CM

## 2019-11-25 PROCEDURE — 99024 POSTOP FOLLOW-UP VISIT: CPT | Performed by: PHYSICIAN ASSISTANT

## 2019-11-25 NOTE — PROGRESS NOTES
This is a 58-year-old female that is 11 days status post revision of bilateral reconstructed breast, autologous fat grafting from the flanks and abdomen and revision of abdominal scar.   She previously underwent bilateral mastectomy and flap based reconstru

## 2019-12-06 ENCOUNTER — OFFICE VISIT (OUTPATIENT)
Dept: SURGERY | Facility: CLINIC | Age: 54
End: 2019-12-06
Payer: MEDICARE

## 2019-12-06 DIAGNOSIS — Z90.13 ABSENCE OF BREAST, ACQUIRED, BILATERAL: Primary | ICD-10-CM

## 2019-12-06 PROCEDURE — 99024 POSTOP FOLLOW-UP VISIT: CPT | Performed by: SURGERY

## 2019-12-06 NOTE — PROGRESS NOTES
Bay Judd is a 53year old female who presents today for a follow-up. She is without new complaints and is pleased with the result. Physical Examination:  Breasts: Bilateral breast incisions are well-healed. Good shape and symmetry is noted.   Abd

## 2020-01-17 ENCOUNTER — OFFICE VISIT (OUTPATIENT)
Dept: SURGERY | Facility: CLINIC | Age: 55
End: 2020-01-17
Payer: MEDICARE

## 2020-01-17 DIAGNOSIS — Z90.13 ABSENCE OF BREAST, ACQUIRED, BILATERAL: Primary | ICD-10-CM

## 2020-01-17 PROCEDURE — 99024 POSTOP FOLLOW-UP VISIT: CPT | Performed by: SURGERY

## 2020-01-17 RX ORDER — AMOXICILLIN 500 MG/1
500 CAPSULE ORAL 3 TIMES DAILY
COMMUNITY
End: 2020-02-13 | Stop reason: ALTCHOICE

## 2020-01-17 RX ORDER — ACETAMINOPHEN 325 MG/1
325 TABLET ORAL EVERY 6 HOURS PRN
Status: ON HOLD | COMMUNITY
End: 2020-08-03

## 2020-01-17 NOTE — PROGRESS NOTES
S/P Revision of bilateral reconstructed breasts. 2.       Autologous fat grafting, bilateral reconstructed breasts. 3.       A revision of abdominal scar totaling 9 cm  States she has one area on right breast that is irritated when showering.   On day 2 o

## 2020-01-17 NOTE — PROGRESS NOTES
Raya Nava is a 53year old female who presents today for a follow-up. He complains of flattening the upper portion of bilateral breast.      Physical Examination:  Breasts: Bilateral breast show good symmetry.   Hollowing is noted in the upper poles of b

## 2020-01-17 NOTE — PATIENT INSTRUCTIONS
Surgeon:         Dr. Mack Lopez                                        Tel:        [phone]                                  Fax:        [phone]    Surgery/Procedure:                    Autologous fat grafting to the bilateral reconstructed kortney

## 2020-02-11 ENCOUNTER — TELEPHONE (OUTPATIENT)
Dept: SURGERY | Facility: CLINIC | Age: 55
End: 2020-02-11

## 2020-02-13 DIAGNOSIS — Z90.13 ABSENCE OF BREAST, ACQUIRED, BILATERAL: Primary | ICD-10-CM

## 2020-03-05 ENCOUNTER — TELEPHONE (OUTPATIENT)
Dept: SURGERY | Facility: CLINIC | Age: 55
End: 2020-03-05

## 2020-03-10 ENCOUNTER — TELEPHONE (OUTPATIENT)
Dept: SURGERY | Facility: CLINIC | Age: 55
End: 2020-03-10

## 2020-03-10 NOTE — TELEPHONE ENCOUNTER
Received patients medical clearance via fax from [facility]. Sent for scanning and faxed to PAT. Noted that patient has history of malignant hyperthermia.

## 2020-03-16 ENCOUNTER — TELEPHONE (OUTPATIENT)
Dept: SURGERY | Facility: CLINIC | Age: 55
End: 2020-03-16

## 2020-03-16 NOTE — TELEPHONE ENCOUNTER
Received voicemail from the patient to ensure her surgery is still scheduled for 4/1/2020. Tried to call patient back, no voicemail.

## 2020-03-19 ENCOUNTER — TELEPHONE (OUTPATIENT)
Dept: SURGERY | Facility: CLINIC | Age: 55
End: 2020-03-19

## 2020-03-19 DIAGNOSIS — Z90.13 ABSENCE OF BREAST, ACQUIRED, BILATERAL: Primary | ICD-10-CM

## 2020-03-19 NOTE — TELEPHONE ENCOUNTER
Spoke to patient to cancel surgery that she had scheduled with Dr. Sarita Suarez on 4/1 /2020. I let her know that due to the hospital restrictions as a response to COVID-19 all elective cases will be cancelled.  Also informed her that right now we can not resched

## 2020-04-13 ENCOUNTER — LABORATORY ENCOUNTER (OUTPATIENT)
Dept: LAB | Facility: HOSPITAL | Age: 55
End: 2020-04-13
Attending: ORTHOPAEDIC SURGERY
Payer: MEDICARE

## 2020-04-13 DIAGNOSIS — M25.562 LEFT KNEE PAIN, UNSPECIFIED CHRONICITY: ICD-10-CM

## 2020-04-13 DIAGNOSIS — M17.0 PRIMARY OSTEOARTHRITIS OF BOTH KNEES: ICD-10-CM

## 2020-04-13 PROCEDURE — 85730 THROMBOPLASTIN TIME PARTIAL: CPT

## 2020-04-13 PROCEDURE — 81001 URINALYSIS AUTO W/SCOPE: CPT

## 2020-04-13 PROCEDURE — 85610 PROTHROMBIN TIME: CPT

## 2020-04-13 PROCEDURE — 86901 BLOOD TYPING SEROLOGIC RH(D): CPT

## 2020-04-13 PROCEDURE — 36415 COLL VENOUS BLD VENIPUNCTURE: CPT

## 2020-04-13 PROCEDURE — 86900 BLOOD TYPING SEROLOGIC ABO: CPT

## 2020-04-13 PROCEDURE — 87081 CULTURE SCREEN ONLY: CPT

## 2020-04-13 PROCEDURE — 86850 RBC ANTIBODY SCREEN: CPT

## 2020-04-13 PROCEDURE — 87086 URINE CULTURE/COLONY COUNT: CPT

## 2020-06-16 ENCOUNTER — TELEPHONE (OUTPATIENT)
Dept: SURGERY | Facility: CLINIC | Age: 55
End: 2020-06-16

## 2020-06-16 NOTE — TELEPHONE ENCOUNTER
Patient was called to schedule her for surgery. Patient was given date of 7/29/2020, however patient is having surgery on 7/3/2020 of her knee. Patient would like to wait prior to scheduling.  Patient would like to recover from her knee surgery first. Camille

## 2020-07-13 PROBLEM — R74.8 HIGH ALKALINE PHOSPHATASE: Status: ACTIVE | Noted: 2020-07-13

## 2020-07-13 PROBLEM — R74.01 TRANSAMINITIS: Status: ACTIVE | Noted: 2020-07-13

## 2020-07-18 ENCOUNTER — LABORATORY ENCOUNTER (OUTPATIENT)
Dept: LAB | Facility: HOSPITAL | Age: 55
End: 2020-07-18
Attending: INTERNAL MEDICINE
Payer: MEDICARE

## 2020-07-18 ENCOUNTER — APPOINTMENT (OUTPATIENT)
Dept: LAB | Facility: HOSPITAL | Age: 55
End: 2020-07-18
Payer: MEDICARE

## 2020-07-18 DIAGNOSIS — M17.12 PRIMARY OSTEOARTHRITIS OF LEFT KNEE: ICD-10-CM

## 2020-07-18 DIAGNOSIS — R74.8 ELEVATED LIVER ENZYMES: ICD-10-CM

## 2020-07-18 LAB
ALBUMIN SERPL-MCNC: 3.1 G/DL (ref 3.4–5)
ALBUMIN/GLOB SERPL: 0.9 {RATIO} (ref 1–2)
ALP LIVER SERPL-CCNC: 136 U/L (ref 41–108)
ALT SERPL-CCNC: 32 U/L (ref 13–56)
ANION GAP SERPL CALC-SCNC: 3 MMOL/L (ref 0–18)
ANTIBODY SCREEN: NEGATIVE
AST SERPL-CCNC: 23 U/L (ref 15–37)
BILIRUB SERPL-MCNC: 0.3 MG/DL (ref 0.1–2)
BUN BLD-MCNC: 6 MG/DL (ref 7–18)
BUN/CREAT SERPL: 7.3 (ref 10–20)
CALCIUM BLD-MCNC: 9 MG/DL (ref 8.5–10.1)
CHLORIDE SERPL-SCNC: 102 MMOL/L (ref 98–112)
CO2 SERPL-SCNC: 31 MMOL/L (ref 21–32)
CREAT BLD-MCNC: 0.82 MG/DL (ref 0.55–1.02)
GLOBULIN PLAS-MCNC: 3.5 G/DL (ref 2.8–4.4)
GLUCOSE BLD-MCNC: 97 MG/DL (ref 70–99)
M PROTEIN MFR SERPL ELPH: 6.6 G/DL (ref 6.4–8.2)
OSMOLALITY SERPL CALC.SUM OF ELEC: 280 MOSM/KG (ref 275–295)
PATIENT FASTING Y/N/NP: YES
POTASSIUM SERPL-SCNC: 3.5 MMOL/L (ref 3.5–5.1)
RH BLOOD TYPE: POSITIVE
SODIUM SERPL-SCNC: 136 MMOL/L (ref 136–145)

## 2020-07-18 PROCEDURE — 87081 CULTURE SCREEN ONLY: CPT

## 2020-07-18 PROCEDURE — 36415 COLL VENOUS BLD VENIPUNCTURE: CPT

## 2020-07-18 PROCEDURE — 86901 BLOOD TYPING SEROLOGIC RH(D): CPT

## 2020-07-18 PROCEDURE — 93010 ELECTROCARDIOGRAM REPORT: CPT | Performed by: INTERNAL MEDICINE

## 2020-07-18 PROCEDURE — 86850 RBC ANTIBODY SCREEN: CPT

## 2020-07-18 PROCEDURE — 80053 COMPREHEN METABOLIC PANEL: CPT

## 2020-07-18 PROCEDURE — 86900 BLOOD TYPING SEROLOGIC ABO: CPT

## 2020-07-18 PROCEDURE — 93005 ELECTROCARDIOGRAM TRACING: CPT

## 2020-07-19 LAB
ATRIAL RATE: 69 BPM
P AXIS: 44 DEGREES
P-R INTERVAL: 174 MS
Q-T INTERVAL: 420 MS
QRS DURATION: 72 MS
QTC CALCULATION (BEZET): 450 MS
R AXIS: 10 DEGREES
T AXIS: 12 DEGREES
VENTRICULAR RATE: 69 BPM

## 2020-07-31 ENCOUNTER — LAB ENCOUNTER (OUTPATIENT)
Dept: LAB | Facility: HOSPITAL | Age: 55
End: 2020-07-31
Attending: ORTHOPAEDIC SURGERY
Payer: MEDICARE

## 2020-07-31 DIAGNOSIS — M17.12 PRIMARY OSTEOARTHRITIS OF LEFT KNEE: ICD-10-CM

## 2020-08-01 LAB — SARS-COV-2 RNA RESP QL NAA+PROBE: NOT DETECTED

## 2020-08-03 ENCOUNTER — APPOINTMENT (OUTPATIENT)
Dept: GENERAL RADIOLOGY | Facility: HOSPITAL | Age: 55
End: 2020-08-03
Attending: ORTHOPAEDIC SURGERY
Payer: MEDICARE

## 2020-08-03 ENCOUNTER — ANESTHESIA (OUTPATIENT)
Dept: SURGERY | Facility: HOSPITAL | Age: 55
End: 2020-08-03
Payer: MEDICARE

## 2020-08-03 ENCOUNTER — ANESTHESIA EVENT (OUTPATIENT)
Dept: SURGERY | Facility: HOSPITAL | Age: 55
End: 2020-08-03
Payer: MEDICARE

## 2020-08-03 ENCOUNTER — HOSPITAL ENCOUNTER (OUTPATIENT)
Facility: HOSPITAL | Age: 55
Discharge: HOME HEALTH | End: 2020-08-04
Attending: ORTHOPAEDIC SURGERY | Admitting: ORTHOPAEDIC SURGERY
Payer: MEDICARE

## 2020-08-03 DIAGNOSIS — M17.12 PRIMARY OSTEOARTHRITIS OF LEFT KNEE: Primary | ICD-10-CM

## 2020-08-03 LAB
DEPRECATED RDW RBC AUTO: 45.4 FL (ref 35.1–46.3)
ERYTHROCYTE [DISTWIDTH] IN BLOOD BY AUTOMATED COUNT: 12.8 % (ref 11–15)
HCT VFR BLD AUTO: 36.3 % (ref 35–48)
HGB BLD-MCNC: 11.4 G/DL (ref 12–16)
MCH RBC QN AUTO: 30.2 PG (ref 26–34)
MCHC RBC AUTO-ENTMCNC: 31.4 G/DL (ref 31–37)
MCV RBC AUTO: 96 FL (ref 80–100)
PLATELET # BLD AUTO: 247 10(3)UL (ref 150–450)
RBC # BLD AUTO: 3.78 X10(6)UL (ref 3.8–5.3)
WBC # BLD AUTO: 7.9 X10(3) UL (ref 4–11)

## 2020-08-03 PROCEDURE — 76942 ECHO GUIDE FOR BIOPSY: CPT | Performed by: ANESTHESIOLOGY

## 2020-08-03 PROCEDURE — 88311 DECALCIFY TISSUE: CPT | Performed by: ORTHOPAEDIC SURGERY

## 2020-08-03 PROCEDURE — 0SRD0J9 REPLACEMENT OF LEFT KNEE JOINT WITH SYNTHETIC SUBSTITUTE, CEMENTED, OPEN APPROACH: ICD-10-PCS | Performed by: ORTHOPAEDIC SURGERY

## 2020-08-03 PROCEDURE — 73560 X-RAY EXAM OF KNEE 1 OR 2: CPT | Performed by: ORTHOPAEDIC SURGERY

## 2020-08-03 PROCEDURE — 97161 PT EVAL LOW COMPLEX 20 MIN: CPT

## 2020-08-03 PROCEDURE — 88305 TISSUE EXAM BY PATHOLOGIST: CPT | Performed by: ORTHOPAEDIC SURGERY

## 2020-08-03 PROCEDURE — 97116 GAIT TRAINING THERAPY: CPT

## 2020-08-03 PROCEDURE — 85027 COMPLETE CBC AUTOMATED: CPT | Performed by: ORTHOPAEDIC SURGERY

## 2020-08-03 DEVICE — PSN FEM CR CMT CCR NRW SZ7 L: Type: IMPLANTABLE DEVICE | Site: KNEE | Status: FUNCTIONAL

## 2020-08-03 DEVICE — PSN TIB STM 5 DEG SZ E L: Type: IMPLANTABLE DEVICE | Site: KNEE | Status: FUNCTIONAL

## 2020-08-03 DEVICE — REFOBACIN BC R 1X40 US: Type: IMPLANTABLE DEVICE | Site: KNEE | Status: FUNCTIONAL

## 2020-08-03 DEVICE — PERSONA CM FM/CM TB/VE: Type: IMPLANTABLE DEVICE | Status: FUNCTIONAL

## 2020-08-03 DEVICE — PSN STR HYB ST 14X+30 M: Type: IMPLANTABLE DEVICE | Site: KNEE | Status: FUNCTIONAL

## 2020-08-03 DEVICE — PSN ALL POLY PAT PLY 32MM: Type: IMPLANTABLE DEVICE | Site: KNEE | Status: FUNCTIONAL

## 2020-08-03 RX ORDER — KETAMINE HYDROCHLORIDE 50 MG/ML
INJECTION, SOLUTION, CONCENTRATE INTRAMUSCULAR; INTRAVENOUS AS NEEDED
Status: DISCONTINUED | OUTPATIENT
Start: 2020-08-03 | End: 2020-08-03 | Stop reason: SURG

## 2020-08-03 RX ORDER — CLONIDINE 100 UG/ML
INJECTION, SOLUTION EPIDURAL AS NEEDED
Status: DISCONTINUED | OUTPATIENT
Start: 2020-08-03 | End: 2020-08-03 | Stop reason: SURG

## 2020-08-03 RX ORDER — ACETAMINOPHEN 325 MG/1
650 TABLET ORAL ONCE
Status: COMPLETED | OUTPATIENT
Start: 2020-08-03 | End: 2020-08-03

## 2020-08-03 RX ORDER — SODIUM CHLORIDE, SODIUM LACTATE, POTASSIUM CHLORIDE, CALCIUM CHLORIDE 600; 310; 30; 20 MG/100ML; MG/100ML; MG/100ML; MG/100ML
INJECTION, SOLUTION INTRAVENOUS CONTINUOUS
Status: DISCONTINUED | OUTPATIENT
Start: 2020-08-03 | End: 2020-08-03

## 2020-08-03 RX ORDER — POLYETHYLENE GLYCOL 3350 17 G/17G
17 POWDER, FOR SOLUTION ORAL DAILY PRN
Status: DISCONTINUED | OUTPATIENT
Start: 2020-08-03 | End: 2020-08-04

## 2020-08-03 RX ORDER — HYDROMORPHONE HYDROCHLORIDE 1 MG/ML
0.8 INJECTION, SOLUTION INTRAMUSCULAR; INTRAVENOUS; SUBCUTANEOUS EVERY 2 HOUR PRN
Status: DISCONTINUED | OUTPATIENT
Start: 2020-08-03 | End: 2020-08-04

## 2020-08-03 RX ORDER — CEFAZOLIN SODIUM/WATER 2 G/20 ML
SYRINGE (ML) INTRAVENOUS
Status: DISPENSED
Start: 2020-08-03 | End: 2020-08-03

## 2020-08-03 RX ORDER — DIPHENHYDRAMINE HYDROCHLORIDE 50 MG/ML
12.5 INJECTION INTRAMUSCULAR; INTRAVENOUS EVERY 4 HOURS PRN
Status: DISCONTINUED | OUTPATIENT
Start: 2020-08-03 | End: 2020-08-04

## 2020-08-03 RX ORDER — GLYCOPYRROLATE 0.2 MG/ML
INJECTION, SOLUTION INTRAMUSCULAR; INTRAVENOUS AS NEEDED
Status: DISCONTINUED | OUTPATIENT
Start: 2020-08-03 | End: 2020-08-03 | Stop reason: SURG

## 2020-08-03 RX ORDER — CEFAZOLIN SODIUM/WATER 2 G/20 ML
2 SYRINGE (ML) INTRAVENOUS ONCE
Status: COMPLETED | OUTPATIENT
Start: 2020-08-03 | End: 2020-08-03

## 2020-08-03 RX ORDER — ACETAMINOPHEN 500 MG
1000 TABLET ORAL ONCE
Status: DISCONTINUED | OUTPATIENT
Start: 2020-08-03 | End: 2020-08-03

## 2020-08-03 RX ORDER — DIPHENHYDRAMINE HYDROCHLORIDE 50 MG/ML
25 INJECTION INTRAMUSCULAR; INTRAVENOUS ONCE AS NEEDED
Status: ACTIVE | OUTPATIENT
Start: 2020-08-03 | End: 2020-08-03

## 2020-08-03 RX ORDER — OXYCODONE HYDROCHLORIDE 5 MG/1
5 TABLET ORAL EVERY 4 HOURS PRN
Status: DISCONTINUED | OUTPATIENT
Start: 2020-08-03 | End: 2020-08-04

## 2020-08-03 RX ORDER — TIZANIDINE 4 MG/1
4 TABLET ORAL 3 TIMES DAILY PRN
Status: DISCONTINUED | OUTPATIENT
Start: 2020-08-03 | End: 2020-08-04

## 2020-08-03 RX ORDER — OXYCODONE HYDROCHLORIDE 10 MG/1
10 TABLET ORAL EVERY 4 HOURS PRN
Status: DISCONTINUED | OUTPATIENT
Start: 2020-08-03 | End: 2020-08-04

## 2020-08-03 RX ORDER — DEXAMETHASONE SODIUM PHOSPHATE 10 MG/ML
INJECTION, SOLUTION INTRAMUSCULAR; INTRAVENOUS AS NEEDED
Status: DISCONTINUED | OUTPATIENT
Start: 2020-08-03 | End: 2020-08-03 | Stop reason: SURG

## 2020-08-03 RX ORDER — ONDANSETRON 2 MG/ML
INJECTION INTRAMUSCULAR; INTRAVENOUS AS NEEDED
Status: DISCONTINUED | OUTPATIENT
Start: 2020-08-03 | End: 2020-08-03 | Stop reason: SURG

## 2020-08-03 RX ORDER — LOSARTAN POTASSIUM 50 MG/1
50 TABLET ORAL DAILY
Status: DISCONTINUED | OUTPATIENT
Start: 2020-08-03 | End: 2020-08-04

## 2020-08-03 RX ORDER — GABAPENTIN 300 MG/1
300 CAPSULE ORAL EVERY 24 HOURS
Status: DISCONTINUED | OUTPATIENT
Start: 2020-08-03 | End: 2020-08-04

## 2020-08-03 RX ORDER — KETOROLAC TROMETHAMINE 30 MG/ML
INJECTION, SOLUTION INTRAMUSCULAR; INTRAVENOUS AS NEEDED
Status: DISCONTINUED | OUTPATIENT
Start: 2020-08-03 | End: 2020-08-03 | Stop reason: SURG

## 2020-08-03 RX ORDER — ACETAMINOPHEN 500 MG
1000 TABLET ORAL ONCE
Status: ON HOLD | COMMUNITY
End: 2020-08-04

## 2020-08-03 RX ORDER — HYDROCHLOROTHIAZIDE 25 MG/1
12.5 TABLET ORAL DAILY
Status: DISCONTINUED | OUTPATIENT
Start: 2020-08-03 | End: 2020-08-04

## 2020-08-03 RX ORDER — ZOLPIDEM TARTRATE 5 MG/1
5 TABLET ORAL NIGHTLY PRN
Status: DISCONTINUED | OUTPATIENT
Start: 2020-08-03 | End: 2020-08-04

## 2020-08-03 RX ORDER — PANTOPRAZOLE SODIUM 20 MG/1
20 TABLET, DELAYED RELEASE ORAL
Status: DISCONTINUED | OUTPATIENT
Start: 2020-08-03 | End: 2020-08-04

## 2020-08-03 RX ORDER — BUPRENORPHINE HYDROCHLORIDE 0.32 MG/ML
INJECTION INTRAMUSCULAR; INTRAVENOUS AS NEEDED
Status: DISCONTINUED | OUTPATIENT
Start: 2020-08-03 | End: 2020-08-03 | Stop reason: SURG

## 2020-08-03 RX ORDER — ACETAMINOPHEN 500 MG
1000 TABLET ORAL 4 TIMES DAILY
Status: DISCONTINUED | OUTPATIENT
Start: 2020-08-03 | End: 2020-08-04

## 2020-08-03 RX ORDER — CETIRIZINE HYDROCHLORIDE 10 MG/1
10 TABLET ORAL NIGHTLY
Status: DISCONTINUED | OUTPATIENT
Start: 2020-08-03 | End: 2020-08-04

## 2020-08-03 RX ORDER — OXYCODONE HYDROCHLORIDE 15 MG/1
15 TABLET ORAL EVERY 4 HOURS PRN
Status: DISCONTINUED | OUTPATIENT
Start: 2020-08-03 | End: 2020-08-04

## 2020-08-03 RX ORDER — BISACODYL 10 MG
10 SUPPOSITORY, RECTAL RECTAL
Status: DISCONTINUED | OUTPATIENT
Start: 2020-08-03 | End: 2020-08-04

## 2020-08-03 RX ORDER — MELATONIN
325
Status: DISCONTINUED | OUTPATIENT
Start: 2020-08-03 | End: 2020-08-04

## 2020-08-03 RX ORDER — KETOROLAC TROMETHAMINE 30 MG/ML
30 INJECTION, SOLUTION INTRAMUSCULAR; INTRAVENOUS EVERY 6 HOURS
Status: COMPLETED | OUTPATIENT
Start: 2020-08-03 | End: 2020-08-04

## 2020-08-03 RX ORDER — ASPIRIN 325 MG
325 TABLET ORAL 2 TIMES DAILY
Status: DISCONTINUED | OUTPATIENT
Start: 2020-08-03 | End: 2020-08-04

## 2020-08-03 RX ORDER — DOCUSATE SODIUM 100 MG/1
100 CAPSULE, LIQUID FILLED ORAL 2 TIMES DAILY
Status: DISCONTINUED | OUTPATIENT
Start: 2020-08-03 | End: 2020-08-04

## 2020-08-03 RX ORDER — DIPHENHYDRAMINE HCL 25 MG
25 CAPSULE ORAL EVERY 4 HOURS PRN
Status: DISCONTINUED | OUTPATIENT
Start: 2020-08-03 | End: 2020-08-04

## 2020-08-03 RX ORDER — SENNOSIDES 8.6 MG
17.2 TABLET ORAL NIGHTLY
Status: DISCONTINUED | OUTPATIENT
Start: 2020-08-03 | End: 2020-08-04

## 2020-08-03 RX ORDER — NALOXONE HYDROCHLORIDE 0.4 MG/ML
80 INJECTION, SOLUTION INTRAMUSCULAR; INTRAVENOUS; SUBCUTANEOUS AS NEEDED
Status: DISCONTINUED | OUTPATIENT
Start: 2020-08-03 | End: 2020-08-03 | Stop reason: HOSPADM

## 2020-08-03 RX ORDER — HYDROMORPHONE HYDROCHLORIDE 1 MG/ML
0.2 INJECTION, SOLUTION INTRAMUSCULAR; INTRAVENOUS; SUBCUTANEOUS EVERY 2 HOUR PRN
Status: DISCONTINUED | OUTPATIENT
Start: 2020-08-03 | End: 2020-08-04

## 2020-08-03 RX ORDER — GABAPENTIN 300 MG/1
600 CAPSULE ORAL
Status: DISCONTINUED | OUTPATIENT
Start: 2020-08-03 | End: 2020-08-04

## 2020-08-03 RX ORDER — PROCHLORPERAZINE EDISYLATE 5 MG/ML
10 INJECTION INTRAMUSCULAR; INTRAVENOUS EVERY 6 HOURS PRN
Status: DISCONTINUED | OUTPATIENT
Start: 2020-08-03 | End: 2020-08-04

## 2020-08-03 RX ORDER — OXYBUTYNIN CHLORIDE 5 MG/1
5 TABLET ORAL 2 TIMES DAILY
Status: DISCONTINUED | OUTPATIENT
Start: 2020-08-03 | End: 2020-08-04

## 2020-08-03 RX ORDER — POTASSIUM CHLORIDE 1500 MG/1
1 TABLET, FILM COATED, EXTENDED RELEASE ORAL DAILY
COMMUNITY
Start: 2020-07-14 | End: 2020-10-12

## 2020-08-03 RX ORDER — LABETALOL HYDROCHLORIDE 5 MG/ML
INJECTION, SOLUTION INTRAVENOUS AS NEEDED
Status: DISCONTINUED | OUTPATIENT
Start: 2020-08-03 | End: 2020-08-03 | Stop reason: SURG

## 2020-08-03 RX ORDER — SODIUM PHOSPHATE, DIBASIC AND SODIUM PHOSPHATE, MONOBASIC 7; 19 G/133ML; G/133ML
1 ENEMA RECTAL ONCE AS NEEDED
Status: DISCONTINUED | OUTPATIENT
Start: 2020-08-03 | End: 2020-08-04

## 2020-08-03 RX ORDER — HYDROMORPHONE HYDROCHLORIDE 1 MG/ML
0.4 INJECTION, SOLUTION INTRAMUSCULAR; INTRAVENOUS; SUBCUTANEOUS EVERY 5 MIN PRN
Status: DISCONTINUED | OUTPATIENT
Start: 2020-08-03 | End: 2020-08-03 | Stop reason: HOSPADM

## 2020-08-03 RX ORDER — CETIRIZINE HYDROCHLORIDE 10 MG/1
10 TABLET ORAL DAILY
Status: DISCONTINUED | OUTPATIENT
Start: 2020-08-03 | End: 2020-08-03

## 2020-08-03 RX ORDER — METOCLOPRAMIDE HYDROCHLORIDE 5 MG/ML
10 INJECTION INTRAMUSCULAR; INTRAVENOUS EVERY 6 HOURS PRN
Status: DISCONTINUED | OUTPATIENT
Start: 2020-08-03 | End: 2020-08-04

## 2020-08-03 RX ORDER — SODIUM CHLORIDE 9 MG/ML
INJECTION, SOLUTION INTRAVENOUS CONTINUOUS
Status: DISCONTINUED | OUTPATIENT
Start: 2020-08-03 | End: 2020-08-04

## 2020-08-03 RX ORDER — CETIRIZINE HYDROCHLORIDE 10 MG/1
10 TABLET ORAL DAILY
Status: DISCONTINUED | OUTPATIENT
Start: 2020-08-04 | End: 2020-08-03

## 2020-08-03 RX ORDER — MIDAZOLAM HYDROCHLORIDE 1 MG/ML
INJECTION INTRAMUSCULAR; INTRAVENOUS AS NEEDED
Status: DISCONTINUED | OUTPATIENT
Start: 2020-08-03 | End: 2020-08-03 | Stop reason: SURG

## 2020-08-03 RX ORDER — DEXAMETHASONE SODIUM PHOSPHATE 10 MG/ML
8 INJECTION, SOLUTION INTRAMUSCULAR; INTRAVENOUS ONCE
Status: COMPLETED | OUTPATIENT
Start: 2020-08-04 | End: 2020-08-04

## 2020-08-03 RX ORDER — HYDROMORPHONE HYDROCHLORIDE 1 MG/ML
INJECTION, SOLUTION INTRAMUSCULAR; INTRAVENOUS; SUBCUTANEOUS
Status: COMPLETED
Start: 2020-08-03 | End: 2020-08-03

## 2020-08-03 RX ORDER — ACETAMINOPHEN 325 MG/1
TABLET ORAL
Status: COMPLETED
Start: 2020-08-03 | End: 2020-08-03

## 2020-08-03 RX ORDER — HYDROMORPHONE HYDROCHLORIDE 1 MG/ML
0.4 INJECTION, SOLUTION INTRAMUSCULAR; INTRAVENOUS; SUBCUTANEOUS EVERY 2 HOUR PRN
Status: DISCONTINUED | OUTPATIENT
Start: 2020-08-03 | End: 2020-08-04

## 2020-08-03 RX ORDER — ONDANSETRON 2 MG/ML
4 INJECTION INTRAMUSCULAR; INTRAVENOUS EVERY 4 HOURS PRN
Status: DISCONTINUED | OUTPATIENT
Start: 2020-08-03 | End: 2020-08-04

## 2020-08-03 RX ORDER — DEXAMETHASONE SODIUM PHOSPHATE 4 MG/ML
VIAL (ML) INJECTION AS NEEDED
Status: DISCONTINUED | OUTPATIENT
Start: 2020-08-03 | End: 2020-08-03 | Stop reason: SURG

## 2020-08-03 RX ORDER — SCOLOPAMINE TRANSDERMAL SYSTEM 1 MG/1
1 PATCH, EXTENDED RELEASE TRANSDERMAL ONCE
Status: DISCONTINUED | OUTPATIENT
Start: 2020-08-03 | End: 2020-08-04

## 2020-08-03 RX ORDER — CEFAZOLIN SODIUM/WATER 2 G/20 ML
2 SYRINGE (ML) INTRAVENOUS EVERY 8 HOURS
Status: COMPLETED | OUTPATIENT
Start: 2020-08-03 | End: 2020-08-03

## 2020-08-03 RX ADMIN — BUPRENORPHINE HYDROCHLORIDE 150 MCG: 0.32 INJECTION INTRAMUSCULAR; INTRAVENOUS at 07:14:00

## 2020-08-03 RX ADMIN — KETOROLAC TROMETHAMINE 30 MG: 30 INJECTION, SOLUTION INTRAMUSCULAR; INTRAVENOUS at 08:00:00

## 2020-08-03 RX ADMIN — DEXAMETHASONE SODIUM PHOSPHATE 8 MG: 4 MG/ML VIAL (ML) INJECTION at 07:24:00

## 2020-08-03 RX ADMIN — SODIUM CHLORIDE, SODIUM LACTATE, POTASSIUM CHLORIDE, CALCIUM CHLORIDE: 600; 310; 30; 20 INJECTION, SOLUTION INTRAVENOUS at 08:49:00

## 2020-08-03 RX ADMIN — ONDANSETRON 4 MG: 2 INJECTION INTRAMUSCULAR; INTRAVENOUS at 07:24:00

## 2020-08-03 RX ADMIN — MIDAZOLAM HYDROCHLORIDE 2 MG: 1 INJECTION INTRAMUSCULAR; INTRAVENOUS at 07:07:00

## 2020-08-03 RX ADMIN — KETAMINE HYDROCHLORIDE 35 MG: 50 INJECTION, SOLUTION, CONCENTRATE INTRAMUSCULAR; INTRAVENOUS at 07:26:00

## 2020-08-03 RX ADMIN — LABETALOL HYDROCHLORIDE 10 MG: 5 INJECTION, SOLUTION INTRAVENOUS at 08:31:00

## 2020-08-03 RX ADMIN — GLYCOPYRROLATE 0.2 MG: 0.2 INJECTION, SOLUTION INTRAMUSCULAR; INTRAVENOUS at 07:07:00

## 2020-08-03 RX ADMIN — DEXAMETHASONE SODIUM PHOSPHATE 2 MG: 10 INJECTION, SOLUTION INTRAMUSCULAR; INTRAVENOUS at 07:12:00

## 2020-08-03 RX ADMIN — CEFAZOLIN SODIUM/WATER 2 G: 2 G/20 ML SYRINGE (ML) INTRAVENOUS at 07:24:00

## 2020-08-03 RX ADMIN — DEXAMETHASONE SODIUM PHOSPHATE 2 MG: 10 INJECTION, SOLUTION INTRAMUSCULAR; INTRAVENOUS at 07:14:00

## 2020-08-03 RX ADMIN — KETAMINE HYDROCHLORIDE 15 MG: 50 INJECTION, SOLUTION, CONCENTRATE INTRAMUSCULAR; INTRAVENOUS at 07:07:00

## 2020-08-03 RX ADMIN — MIDAZOLAM HYDROCHLORIDE 2 MG: 1 INJECTION INTRAMUSCULAR; INTRAVENOUS at 08:28:00

## 2020-08-03 RX ADMIN — CLONIDINE 80 MCG: 100 INJECTION, SOLUTION EPIDURAL at 07:14:00

## 2020-08-03 NOTE — OPERATIVE REPORT
[facility]    PATIENT'S NAME: Barry Caal   ATTENDING PHYSICIAN: Murtaza Ireen M.D. OPERATING PHYSICIAN: Murtaza Irene M.D.    PATIENT ACCOUNT#:   [de-identified]    LOCATION:  PACU [facility] PACU 3 EDWP 10  MEDICAL RECORD #:   [id number]       DATE OF SAMM posterior, and chamfer cuts were made. A small superficial portion of the lateral cortex was cut. Then, the PCL was cut. Then, the tibial cutting guide was placed and pinned into place, and the tibial cut was then made.   A spacer block was used and this postoperatively at home. She stated that she would use her own medication. Sharyle Najjar, the physician assistant, was necessary to perform the case. He was essential in all parts of the procedure.     Dictated By Jen Marques M.D.  d: 08/03/2020 09

## 2020-08-03 NOTE — PHYSICAL THERAPY NOTE
PHYSICAL THERAPY KNEE EVALUATION - INPATIENT     Room Number: 351/351-A  Evaluation Date: 8/3/2020  Type of Evaluation: Initial  Physician Order: PT Eval and Treat    Presenting Problem: s/p L TKA 8/3/20  Reason for Therapy: Mobility Dysfunction and Discha MAIN OR   • OTHER Right     KNEE ARTHROSCOPY   • OTHER SURGICAL HISTORY  10/2018    bilateral simple mastectomies - Dr. Jones Manrique   • SPINE SURGERY PROCEDURE UNLISTED      C3-T1 FUSIONS       HOME SITU[place]  [place]   Home Layout: Two [facility] O2 WALK                  AM-PAC '6-Clicks' INPATIENT SHORT FORM - BASIC MOBILITY  How much difficulty does the patient currently have. ..  -   Turning over in bed (including adjusting bedclothes, sheets and blankets)?: A Little   -   Sitting down on and awareness, fall precautions, and activity level and pacing. Instructed pt to always call nursing staff for assistance. Pt in understanding and RN notified of session.      Exercise/Education Provided:  Bed mobility  Body mechanics  Energy conservation  Func education; Family education;Range of motion;Strengthening;Stair training;Transfer training;Balance training  Rehab Potential : Good  Frequency (Obs): Daily  Number of Visits to Meet Established Goals: 5      CURRENT GOALS   Goal #1    Patient is able to [facility]

## 2020-08-03 NOTE — ADDENDUM NOTE
Addendum  created 08/03/20 1011 by Mackenzie Pierre MD    Clinical Note Signed, Intraprocedure Blocks edited, Intraprocedure Meds edited

## 2020-08-03 NOTE — ANESTHESIA PROCEDURE NOTES
Adductor canal + IPACK  Performed by: Shane Martinez MD  Authorized by: Shane Martinez MD       General Information and Staff    Start Time:  8/3/2020 7:11 AM  End Time:  8/3/2020 7:14 AM  Anesthesiologist:  Shane Martinez MD  Performed by:   Anesthesiologist spread to the popliteal artery or peroneal/tibial nerves. 5ml at medial, 10ml at midpoint, and 5ml at lateral portion of the posterior femoral surface injected.      ACB 20ml Bupivacaine 0.25% + 2mg Preservative-free Dexamethasone + 150mcg Buprenorphine, +

## 2020-08-03 NOTE — BRIEF OP NOTE
Pre-Operative Diagnosis: Primary osteoarthritis of left knee [M17.12]     Post-Operative Diagnosis: Primary osteoarthritis of left knee [M17.12]      Procedure Performed:   Procedure(s):  LEFT TOTAL KNEE ARTHROPLASTY    Surgeon(s) and Role:     * Wero

## 2020-08-03 NOTE — ANESTHESIA POSTPROCEDURE EVALUATION
[facility]    Kenzie Pinedo Patient Status:  Outpatient in a Bed   Age/Gender 53year old female MRN [id number]   [facility] SURGERY Attending Ede Conteh MD   Hosp Day # 0 PCP Marcela Shah MD       Anesthesia Post-op Note    Pr

## 2020-08-03 NOTE — PHYSICAL THERAPY NOTE
PT orders received, RN approved PT session, Attempted to see pt for PT however, pt continues to have numbness in the distal medial aspect of leg, unable to perform quad set or SLR. Will attempt to see pt later.  GIANCARLO

## 2020-08-03 NOTE — ANESTHESIA PROCEDURE NOTES
Spinal Block  Performed by: Mike Saul MD  Authorized by: Mike Saul MD       General Information and Staff    Start Time:  8/3/2020 7:06 AM  End Time:  8/3/2020 7:09 AM  Anesthesiologist:  Mike Saul MD  Performed by:   Anesthesiologist  Patient L

## 2020-08-03 NOTE — PROGRESS NOTES
NURSING ADMISSION NOTE      Patient admitted via Cart/bed from PACU, post left total knee replacement done by Dr. Gabriela Schroeder. Oriented to room. Received drowsy but easily awaken. She is accompanied by her partner. Safety precautions initiated.   Patient

## 2020-08-03 NOTE — CONSULTS
[facility] Hospitalist Initial Consult       Reason for consult: Medical Management sp TKA      History of Present Illness: Patient is a 53year old female with PMH sig for anxiety, fibromyalgia, HTN  who presents sp TKA.    They tolerated the procedure well witho Bilateral 11/14/2019    Performed by Vi Griffiths MD at [facility] MAIN OR   • FRACTURE SURGERY Left     ELBOW   • HYSTERECTOMY  2009    laparscopic Nupur Alvarado intact    • LAPAROSCOPY,DIAGNOSTIC     • MASTECTOMY LEFT     • MASTECTOMY RIGHT     • MICRO FLAP Bilate Mucosa normal. No drainage.    Throat: Lips, mucosa, and tongue normal. Teeth and gums normal.   Neck: Supple, symmetrical, trachea midline, no cervical or supraclavicular lymph adenopathy, thyroid: no enlargment/tenderness/nodules appreciated   Lungs:   Cl

## 2020-08-03 NOTE — ANESTHESIA PREPROCEDURE EVALUATION
PRE-OP EVALUATION    Patient Name: Roel Villa    Pre-op Diagnosis: Primary osteoarthritis of left knee [M17.12]    Procedure(s):  LEFT TOTAL KNEE ARTHROPLASTY    Surgeon(s) and Role:     * Abdi Conteh MD - Primary    Pre-op vitals reviewed.   Temp: 9 omeprazole 20 MG Oral Capsule Delayed Release, Take 20 mg by mouth nightly.  , Disp: , Rfl:   escitalopram 20 MG Oral Tab, Take 30 mg by mouth nightly.  , Disp: , Rfl: 1        Allergies: Anesthetics, Amide; Caffeine; Demerol Hcl [Meperidine];  Succinylch Jose Reed   • SPINE SURGERY PROCEDURE UNLISTED      C3-T1 FUSIONS     Social History    Tobacco Use      Smoking status: Never Smoker      Smokeless tobacco: Never Used    Alcohol use:  Yes      Alcohol/week: 2.0 standard drinks      Types: 2 Glasses of w intraoperative sedation (open to its own complications of intraoperative awareness, discomfort, aspiration risk).  Intrinsic risks of neuraxial anesthesia including failed spinal (necessitating conversion to GA), infection, epidural hematoma, nerve/cord inj

## 2020-08-04 VITALS
WEIGHT: 198.63 LBS | TEMPERATURE: 99 F | DIASTOLIC BLOOD PRESSURE: 63 MMHG | SYSTOLIC BLOOD PRESSURE: 129 MMHG | RESPIRATION RATE: 22 BRPM | BODY MASS INDEX: 36.55 KG/M2 | HEIGHT: 62 IN | OXYGEN SATURATION: 92 % | HEART RATE: 72 BPM

## 2020-08-04 PROCEDURE — 97530 THERAPEUTIC ACTIVITIES: CPT

## 2020-08-04 PROCEDURE — 97165 OT EVAL LOW COMPLEX 30 MIN: CPT

## 2020-08-04 PROCEDURE — 97110 THERAPEUTIC EXERCISES: CPT

## 2020-08-04 PROCEDURE — 97535 SELF CARE MNGMENT TRAINING: CPT

## 2020-08-04 PROCEDURE — 97116 GAIT TRAINING THERAPY: CPT

## 2020-08-04 RX ORDER — HYDROCODONE BITARTRATE AND ACETAMINOPHEN 10; 325 MG/1; MG/1
1 TABLET ORAL EVERY 4 HOURS PRN
Status: DISCONTINUED | OUTPATIENT
Start: 2020-08-04 | End: 2020-08-04

## 2020-08-04 RX ORDER — POTASSIUM CHLORIDE 20 MEQ/1
20 TABLET, EXTENDED RELEASE ORAL DAILY
Status: DISCONTINUED | OUTPATIENT
Start: 2020-08-04 | End: 2020-08-04

## 2020-08-04 RX ORDER — ASPIRIN 325 MG
325 TABLET ORAL 2 TIMES DAILY
Qty: 1 TABLET | Refills: 0 | Status: SHIPPED | OUTPATIENT
Start: 2020-08-04 | End: 2020-08-18 | Stop reason: ALTCHOICE

## 2020-08-04 RX ORDER — HYDROCODONE BITARTRATE AND ACETAMINOPHEN 10; 325 MG/1; MG/1
2 TABLET ORAL EVERY 4 HOURS PRN
Status: DISCONTINUED | OUTPATIENT
Start: 2020-08-04 | End: 2020-08-04

## 2020-08-04 RX ORDER — ZOLPIDEM TARTRATE 10 MG/1
10 TABLET ORAL NIGHTLY PRN
Status: DISCONTINUED | OUTPATIENT
Start: 2020-08-04 | End: 2020-08-04

## 2020-08-04 NOTE — CM/SW NOTE
[facility] able to accept referral for [facility] services. / to remain available for support and/or discharge planning.      [address], [place]  Discharge Planner  [phone]

## 2020-08-04 NOTE — PROGRESS NOTES
Feels well, wants to go home  Dressing intact dry  Calf st nt  nvi   Cont   Hg 11.4  Cont PT  Dc home  Aspririn 325 2x daily at home; she has at home

## 2020-08-04 NOTE — PLAN OF CARE
Left knee with ace wrap dressing CDI. Verbalized pain level between 6-8, asking for Oxycontin 10 mg 1 tablet to be given every 4 hours when it's due. Reinforced instructions about use of IS, call for assistance with use of call light.    to a

## 2020-08-04 NOTE — PROGRESS NOTES
Post Op Day 1 Ortho Note    Status Post Nerve Block:  Type of Nerve Block: Left adductor canal and IPACK  Single Injection Nerve Block    Post op review: No evidence of immediate block related complications, No paresthesia noted, Able to plantar flex foot,

## 2020-08-04 NOTE — PROGRESS NOTES
Guidebook given to patient. Reviewed indications, side effects of pain medication/narcotics and constipation prevention.  Stressed importance of increased fluids/roughage in diet, continued use of stool softeners along with laxatives and suppositories as ne

## 2020-08-04 NOTE — CM/SW NOTE
08/04/20 1000   CM/SW Referral Data   Referral Source Social Work (self-referral)   Reason for Referral Discharge planning   Discharge Needs   Anticipated D/C needs Home health care       HOME SITUATION  Type of Home: [facility]   Home Layout: Two level

## 2020-08-04 NOTE — PROGRESS NOTES
NURSING DISCHARGE NOTE    Discharged Home via Wheelchair. Accompanied by Friend  Belongings Taken by patient/family. Patient with referral to [facility] Home care. Per Dr. Enrique Beckwith patient has Norco 10/325 mg tablet and  mg tablet at home.   Discharge

## 2020-08-04 NOTE — PLAN OF CARE
Pain control adequate on oral medication and scheduled Toradol. Pt moving well, ambulates to toilet with min assist. Pt aware to call when assistance needed. VSS, spo2 94% on 3l/nc. Sensation to lt leg still diminished from the nerve block.  Strong ankle do

## 2020-08-04 NOTE — PHYSICAL THERAPY NOTE
PHYSICAL THERAPY KNEE TREATMENT NOTE - INPATIENT     Room Number: 860/849-Y     Session: 1   Number of Visits to Meet Established Goals: 5    Presenting Problem: s/p L TKA 8/3/20    Problem List  Active Problems:    * No active hospital problems.  *      P PROCEDURE UNLISTED      C3-T1 FUSIONS       SUBJECTIVE  \"I have one eye that sees far, one eye that sees near and my back hurts\"     Patient’s self-stated goal is to return home, pt states she will have support as needed      [address]ospital [address] breaks between d/t c/o back pain which is chronic. Sit>stand supervision. Pt gait trained c RW , CGA cues for step length, WBAT and proper integration of RW, no LoB noted, pt with NBOS.   Stand>sit supervision, after seated rest, pt performed car/tub t/f t demonstrate supine - sit EOB @ level:  Mod I    Goal #2     Patient is able to demonstrate transfers Sit to/from Stand at assistance level: supervison    Goal #3     Patient is able to ambulate 300 feet with assistive device at assistance level: modified  i

## 2020-08-04 NOTE — OCCUPATIONAL THERAPY NOTE
OCCUPATIONAL THERAPY QUICK EVALUATION - INPATIENT    Room Number: 351/351-A  Evaluation Date: 8/4/2020     Type of Evaluation: Initial  Presenting Problem: Left TKA    Physician Order: IP Consult to Occupational Therapy  Reason for Therapy:  ADL/IADL Dysfu MAIN OR   • OTHER Right     KNEE ARTHROSCOPY   • OTHER SURGICAL HISTORY  10/2018    bilateral simple mastectomies - Dr. James Goodwin   • [address] [facility] [address] [facility]      C3-T1 FUSIONS       OCCUPATIONAL PROFILE    HOME SITUATION  Type of Home: [facility] Supervision  Sit to Stand: Supervision    Skilled Therapy Provided: PPE worn: surgical mask, gloves.   Pt was received supine in bed for session, pt t/f to EOB with supervision, therapist completed MMT and ROM on pt, pt completed sit to stand with supervisi this admission.     Patient was able to achieve the following goals:  Patient able to toilet transfer: at previous functional level  Patient able to dress lower extremities: at previous functional level  Patient/Caregiver able to demonstrate safety with ADL

## 2020-08-04 NOTE — PROGRESS NOTES
[facility] Hospitalist Progress Note                                                                   [address]  10/18/1965    SUBJECTIVE:  Didn't sleep well. Asking about her potassium.   No fe HCl    Assessment/Plan:  Active Problems:    * No active hospital problems. *    sp L TKA  - Plan per ortho. Continue PT/OT. Pain is currently controlled.   bid for dvt proph     Acute on chronic pain  - cont po pain meds     HTN  - resume home med

## 2020-08-05 NOTE — CM/SW NOTE
08/04/20 1100   Discharge disposition   Expected discharge disposition Home-Health   Name of Facillity/Home Care/Hospice [facility]   Home services after discharge Skilled home care   Discharge transportation Private car     discharged 8/4

## 2020-08-28 ENCOUNTER — HOSPITAL ENCOUNTER (OUTPATIENT)
Facility: HOSPITAL | Age: 55
Setting detail: OBSERVATION
LOS: 2 days | Discharge: HOME | End: 2020-08-30
Attending: EMERGENCY MEDICINE | Admitting: HOSPITALIST
Payer: MEDICARE

## 2020-08-28 DIAGNOSIS — K92.2 GASTROINTESTINAL HEMORRHAGE, UNSPECIFIED GASTROINTESTINAL HEMORRHAGE TYPE: Primary | ICD-10-CM

## 2020-08-28 PROBLEM — D64.9 ANEMIA, UNSPECIFIED TYPE: Status: ACTIVE | Noted: 2020-08-28

## 2020-08-28 PROBLEM — Z15.01 BRCA GENE POSITIVE: Status: RESOLVED | Noted: 2018-10-15 | Resolved: 2020-08-28

## 2020-08-28 PROBLEM — I82.4Y9 ACUTE DEEP VEIN THROMBOSIS (DVT) OF PROXIMAL VEIN OF LOWER EXTREMITY, UNSPECIFIED LATERALITY (HCC): Status: ACTIVE | Noted: 2020-08-28

## 2020-08-28 PROBLEM — Z15.09 BRCA GENE POSITIVE: Status: RESOLVED | Noted: 2018-10-15 | Resolved: 2020-08-28

## 2020-08-28 PROBLEM — Z14.8 CARRIER OF GENE MUTATION FOR HIGH RISK OF CANCER: Status: RESOLVED | Noted: 2018-08-13 | Resolved: 2020-08-28

## 2020-08-28 PROBLEM — Z15.01 BARD1 GENE MUTATION POSITIVE: Status: RESOLVED | Noted: 2018-10-09 | Resolved: 2020-08-28

## 2020-08-28 LAB
ANION GAP SERPL CALC-SCNC: 5 MMOL/L (ref 0–18)
ANTIBODY SCREEN: NEGATIVE
BASOPHILS # BLD AUTO: 0.07 X10(3) UL (ref 0–0.2)
BASOPHILS NFR BLD AUTO: 1.1 %
BUN BLD-MCNC: 12 MG/DL (ref 7–18)
BUN/CREAT SERPL: 11.7 (ref 10–20)
CALCIUM BLD-MCNC: 9.6 MG/DL (ref 8.5–10.1)
CHLORIDE SERPL-SCNC: 107 MMOL/L (ref 98–112)
CO2 SERPL-SCNC: 27 MMOL/L (ref 21–32)
CREAT BLD-MCNC: 1.03 MG/DL (ref 0.55–1.02)
DEPRECATED RDW RBC AUTO: 46.1 FL (ref 35.1–46.3)
EOSINOPHIL # BLD AUTO: 0.15 X10(3) UL (ref 0–0.7)
EOSINOPHIL NFR BLD AUTO: 2.3 %
ERYTHROCYTE [DISTWIDTH] IN BLOOD BY AUTOMATED COUNT: 13.4 % (ref 11–15)
GLUCOSE BLD-MCNC: 99 MG/DL (ref 70–99)
HCT VFR BLD AUTO: 36.9 % (ref 35–48)
HGB BLD-MCNC: 12 G/DL (ref 12–16)
IMM GRANULOCYTES # BLD AUTO: 0.01 X10(3) UL (ref 0–1)
IMM GRANULOCYTES NFR BLD: 0.2 %
INR BLD: 1.13 (ref 0.9–1.2)
LYMPHOCYTES # BLD AUTO: 1.53 X10(3) UL (ref 1–4)
LYMPHOCYTES NFR BLD AUTO: 23.9 %
MCH RBC QN AUTO: 30.3 PG (ref 26–34)
MCHC RBC AUTO-ENTMCNC: 32.5 G/DL (ref 31–37)
MCV RBC AUTO: 93.2 FL (ref 80–100)
MONOCYTES # BLD AUTO: 0.4 X10(3) UL (ref 0.1–1)
MONOCYTES NFR BLD AUTO: 6.3 %
NEUTROPHILS # BLD AUTO: 4.23 X10 (3) UL (ref 1.5–7.7)
NEUTROPHILS # BLD AUTO: 4.23 X10(3) UL (ref 1.5–7.7)
NEUTROPHILS NFR BLD AUTO: 66.2 %
OSMOLALITY SERPL CALC.SUM OF ELEC: 288 MOSM/KG (ref 275–295)
PLATELET # BLD AUTO: 363 10(3)UL (ref 150–450)
POTASSIUM SERPL-SCNC: 3.6 MMOL/L (ref 3.5–5.1)
PROTHROMBIN TIME: 14.3 SECONDS (ref 11.8–14.5)
RBC # BLD AUTO: 3.96 X10(6)UL (ref 3.8–5.3)
RH BLOOD TYPE: POSITIVE
SARS-COV-2 RNA RESP QL NAA+PROBE: NOT DETECTED
SODIUM SERPL-SCNC: 139 MMOL/L (ref 136–145)
WBC # BLD AUTO: 6.4 X10(3) UL (ref 4–11)

## 2020-08-28 PROCEDURE — 85025 COMPLETE CBC W/AUTO DIFF WBC: CPT

## 2020-08-28 PROCEDURE — 82272 OCCULT BLD FECES 1-3 TESTS: CPT

## 2020-08-28 PROCEDURE — 96374 THER/PROPH/DIAG INJ IV PUSH: CPT

## 2020-08-28 PROCEDURE — 86900 BLOOD TYPING SEROLOGIC ABO: CPT | Performed by: EMERGENCY MEDICINE

## 2020-08-28 PROCEDURE — 99285 EMERGENCY DEPT VISIT HI MDM: CPT

## 2020-08-28 PROCEDURE — 85025 COMPLETE CBC W/AUTO DIFF WBC: CPT | Performed by: EMERGENCY MEDICINE

## 2020-08-28 PROCEDURE — 86901 BLOOD TYPING SEROLOGIC RH(D): CPT

## 2020-08-28 PROCEDURE — 85610 PROTHROMBIN TIME: CPT | Performed by: EMERGENCY MEDICINE

## 2020-08-28 PROCEDURE — 96375 TX/PRO/DX INJ NEW DRUG ADDON: CPT

## 2020-08-28 PROCEDURE — 80048 BASIC METABOLIC PNL TOTAL CA: CPT | Performed by: EMERGENCY MEDICINE

## 2020-08-28 PROCEDURE — 96361 HYDRATE IV INFUSION ADD-ON: CPT

## 2020-08-28 PROCEDURE — C9113 INJ PANTOPRAZOLE SODIUM, VIA: HCPCS | Performed by: HOSPITALIST

## 2020-08-28 PROCEDURE — 86900 BLOOD TYPING SEROLOGIC ABO: CPT

## 2020-08-28 PROCEDURE — 80048 BASIC METABOLIC PNL TOTAL CA: CPT

## 2020-08-28 PROCEDURE — 86850 RBC ANTIBODY SCREEN: CPT

## 2020-08-28 PROCEDURE — 86850 RBC ANTIBODY SCREEN: CPT | Performed by: EMERGENCY MEDICINE

## 2020-08-28 PROCEDURE — 86901 BLOOD TYPING SEROLOGIC RH(D): CPT | Performed by: EMERGENCY MEDICINE

## 2020-08-28 RX ORDER — ACETAMINOPHEN 325 MG/1
650 TABLET ORAL EVERY 6 HOURS PRN
Status: DISCONTINUED | OUTPATIENT
Start: 2020-08-28 | End: 2020-08-30

## 2020-08-28 RX ORDER — LOSARTAN POTASSIUM 50 MG/1
50 TABLET ORAL DAILY
Status: DISCONTINUED | OUTPATIENT
Start: 2020-08-29 | End: 2020-08-30

## 2020-08-28 RX ORDER — PROCHLORPERAZINE EDISYLATE 5 MG/ML
10 INJECTION INTRAMUSCULAR; INTRAVENOUS ONCE
Status: COMPLETED | OUTPATIENT
Start: 2020-08-28 | End: 2020-08-28

## 2020-08-28 RX ORDER — POLYETHYLENE GLYCOL 3350 17 G/17G
17 POWDER, FOR SOLUTION ORAL DAILY PRN
Status: DISCONTINUED | OUTPATIENT
Start: 2020-08-28 | End: 2020-08-30

## 2020-08-28 RX ORDER — CETIRIZINE HYDROCHLORIDE 10 MG/1
10 TABLET ORAL DAILY
Status: DISCONTINUED | OUTPATIENT
Start: 2020-08-29 | End: 2020-08-30

## 2020-08-28 RX ORDER — DOCUSATE SODIUM 100 MG/1
100 CAPSULE, LIQUID FILLED ORAL 2 TIMES DAILY
Status: DISCONTINUED | OUTPATIENT
Start: 2020-08-28 | End: 2020-08-30

## 2020-08-28 RX ORDER — GABAPENTIN 300 MG/1
300 CAPSULE ORAL
Status: DISCONTINUED | OUTPATIENT
Start: 2020-08-29 | End: 2020-08-30

## 2020-08-28 RX ORDER — SODIUM CHLORIDE 0.9 % (FLUSH) 0.9 %
3 SYRINGE (ML) INJECTION AS NEEDED
Status: DISCONTINUED | OUTPATIENT
Start: 2020-08-28 | End: 2020-08-30

## 2020-08-28 RX ORDER — HYDROCODONE BITARTRATE AND ACETAMINOPHEN 10; 325 MG/1; MG/1
1 TABLET ORAL EVERY 8 HOURS PRN
Status: DISCONTINUED | OUTPATIENT
Start: 2020-08-28 | End: 2020-08-29

## 2020-08-28 RX ORDER — BISACODYL 10 MG
10 SUPPOSITORY, RECTAL RECTAL
Status: DISCONTINUED | OUTPATIENT
Start: 2020-08-28 | End: 2020-08-30

## 2020-08-28 RX ORDER — ESCITALOPRAM OXALATE 10 MG/1
30 TABLET ORAL NIGHTLY
Status: DISCONTINUED | OUTPATIENT
Start: 2020-08-28 | End: 2020-08-30

## 2020-08-28 RX ORDER — GABAPENTIN 300 MG/1
600 CAPSULE ORAL 2 TIMES DAILY
Status: DISCONTINUED | OUTPATIENT
Start: 2020-08-28 | End: 2020-08-30

## 2020-08-28 RX ORDER — SODIUM PHOSPHATE, DIBASIC AND SODIUM PHOSPHATE, MONOBASIC 7; 19 G/133ML; G/133ML
1 ENEMA RECTAL ONCE AS NEEDED
Status: DISCONTINUED | OUTPATIENT
Start: 2020-08-28 | End: 2020-08-30

## 2020-08-28 RX ORDER — ZOLPIDEM TARTRATE 5 MG/1
10 TABLET ORAL NIGHTLY
Status: DISCONTINUED | OUTPATIENT
Start: 2020-08-28 | End: 2020-08-30

## 2020-08-28 RX ORDER — METOCLOPRAMIDE HYDROCHLORIDE 5 MG/ML
10 INJECTION INTRAMUSCULAR; INTRAVENOUS EVERY 8 HOURS PRN
Status: DISCONTINUED | OUTPATIENT
Start: 2020-08-28 | End: 2020-08-30

## 2020-08-28 RX ORDER — ONDANSETRON 2 MG/ML
4 INJECTION INTRAMUSCULAR; INTRAVENOUS EVERY 6 HOURS PRN
Status: DISCONTINUED | OUTPATIENT
Start: 2020-08-28 | End: 2020-08-30

## 2020-08-28 RX ORDER — OXYBUTYNIN CHLORIDE 5 MG/1
5 TABLET ORAL 2 TIMES DAILY
Status: DISCONTINUED | OUTPATIENT
Start: 2020-08-28 | End: 2020-08-30

## 2020-08-28 RX ORDER — HYDROCHLOROTHIAZIDE 25 MG/1
12.5 TABLET ORAL DAILY
Status: DISCONTINUED | OUTPATIENT
Start: 2020-08-29 | End: 2020-08-30

## 2020-08-28 NOTE — CONSULTS
REFERRING PHYSICIAN: Dr. Zimmer ref. provider found    HPI:         Thank you very much for requesting me to see the patient. As you know, Chari Peña is a 53year old female who presents today with recent L TKR (7/7/82) complicated by DVT.  On Eliquis x 10 Lindy Simon   • SPINE SURGERY PROCEDURE UNLISTED      C3-T1 FUSIONS     Social History    Tobacco Use      Smoking status: Never Smoker      Smokeless tobacco: Never Used    Alcohol use:  Yes      Alcohol/week: 2.0 standard drinks      Types: 2 Glasses of wine per [Meperi*    RASH  Succinylcholine         OTHER (SEE COMMENTS), FEVER    Comment:Malignant hyperthermia    A comprehensive 10 point review of systems was completed. Pertinent positives and negatives noted in the the HPI.     EXAM:  /79   Pulse 75   T

## 2020-08-28 NOTE — ED NOTES
Patient was put on eliquis after being diagnosed with DVT. Patient states she's been having solid dark stools.

## 2020-08-28 NOTE — H&P (VIEW-ONLY)
REFERRING PHYSICIAN: Dr. Zimmer ref. provider found    HPI:         Thank you very much for requesting me to see the patient. As you know, Yue Cervantes is a 53year old female who presents today with recent L TKR (0/9/12) complicated by DVT.  On Eliquis x 10 Alisha Fitzgerald   • SPINE SURGERY PROCEDURE UNLISTED      C3-T1 FUSIONS     Social History    Tobacco Use      Smoking status: Never Smoker      Smokeless tobacco: Never Used    Alcohol use:  Yes      Alcohol/week: 2.0 standard drinks      Types: 2 Glasses of wine per [Meperi*    RASH  Succinylcholine         OTHER (SEE COMMENTS), FEVER    Comment:Malignant hyperthermia    A comprehensive 10 point review of systems was completed. Pertinent positives and negatives noted in the the HPI.     EXAM:  /79   Pulse 75   T

## 2020-08-28 NOTE — ED PROVIDER NOTES
[facility] [facility]      History   Patient presents with:  GI Bleeding    Stated Complaint: melena/nausea    HPI  26-year-old female history of hypertension, fibromyalgia, depression and anxiety, recent knee replacemen BREAST SIMPLE MASTECTOMY Bilateral 10/15/2018    Performed by Dereck Dia MD at [address]   • ENDOMETRIAL ABLATION     • FAT GRAFTING Bilateral 11/14/2019    Performed by Christian Alexander MD at [address] [facility] MAIN OR   • FRACTURE SURGERY Left     ELBOW   • HYSTERE movements intact. Conjunctiva/sclera: Conjunctivae normal.      Pupils: Pupils are equal, round, and reactive to light. Neck:      Musculoskeletal: Normal range of motion and neck supple.    Cardiovascular:      Rate and Rhythm: Normal rate and regul Abnormality         Status                     ---------                               -----------         ------                     ABORH (BLOOD [facility])[[id number]]                               Final result               ANTIBODY SCREEN[[id number]]

## 2020-08-28 NOTE — ED INITIAL ASSESSMENT (HPI)
Patient here with c/o nausea and melena since being on eliquis. Patient had total knee replacement on 8/3 and was diagnosed with dvt shortly after and started on eliquis.

## 2020-08-28 NOTE — ED NOTES
Orders for admission, patient is aware of plan and ready to go upstairs. Any questions, please call ED RN EVER GALLO at [address]. Alert and oriented x3.  Uses walker to ambulate, standby assist.

## 2020-08-29 ENCOUNTER — ANESTHESIA (OUTPATIENT)
Dept: ENDOSCOPY | Facility: HOSPITAL | Age: 55
End: 2020-08-29
Payer: MEDICARE

## 2020-08-29 ENCOUNTER — ANESTHESIA EVENT (OUTPATIENT)
Dept: ENDOSCOPY | Facility: HOSPITAL | Age: 55
End: 2020-08-29
Payer: MEDICARE

## 2020-08-29 LAB
ANION GAP SERPL CALC-SCNC: 7 MMOL/L (ref 0–18)
BASOPHILS # BLD AUTO: 0.07 X10(3) UL (ref 0–0.2)
BASOPHILS NFR BLD AUTO: 1.4 %
BUN BLD-MCNC: 9 MG/DL (ref 7–18)
BUN/CREAT SERPL: 10.1 (ref 10–20)
CALCIUM BLD-MCNC: 9 MG/DL (ref 8.5–10.1)
CHLORIDE SERPL-SCNC: 108 MMOL/L (ref 98–112)
CO2 SERPL-SCNC: 27 MMOL/L (ref 21–32)
CREAT BLD-MCNC: 0.89 MG/DL (ref 0.55–1.02)
DEPRECATED RDW RBC AUTO: 45.7 FL (ref 35.1–46.3)
EOSINOPHIL # BLD AUTO: 0.17 X10(3) UL (ref 0–0.7)
EOSINOPHIL NFR BLD AUTO: 3.5 %
ERYTHROCYTE [DISTWIDTH] IN BLOOD BY AUTOMATED COUNT: 13.3 % (ref 11–15)
GLUCOSE BLD-MCNC: 94 MG/DL (ref 70–99)
HCT VFR BLD AUTO: 34.2 % (ref 35–48)
HGB BLD-MCNC: 11.2 G/DL (ref 12–16)
IMM GRANULOCYTES # BLD AUTO: 0.01 X10(3) UL (ref 0–1)
IMM GRANULOCYTES NFR BLD: 0.2 %
LYMPHOCYTES # BLD AUTO: 1.49 X10(3) UL (ref 1–4)
LYMPHOCYTES NFR BLD AUTO: 30.3 %
MCH RBC QN AUTO: 30.4 PG (ref 26–34)
MCHC RBC AUTO-ENTMCNC: 32.7 G/DL (ref 31–37)
MCV RBC AUTO: 92.9 FL (ref 80–100)
MONOCYTES # BLD AUTO: 0.34 X10(3) UL (ref 0.1–1)
MONOCYTES NFR BLD AUTO: 6.9 %
NEUTROPHILS # BLD AUTO: 2.83 X10 (3) UL (ref 1.5–7.7)
NEUTROPHILS # BLD AUTO: 2.83 X10(3) UL (ref 1.5–7.7)
NEUTROPHILS NFR BLD AUTO: 57.7 %
OSMOLALITY SERPL CALC.SUM OF ELEC: 292 MOSM/KG (ref 275–295)
PLATELET # BLD AUTO: 306 10(3)UL (ref 150–450)
POTASSIUM SERPL-SCNC: 3.6 MMOL/L (ref 3.5–5.1)
RBC # BLD AUTO: 3.68 X10(6)UL (ref 3.8–5.3)
SODIUM SERPL-SCNC: 142 MMOL/L (ref 136–145)
WBC # BLD AUTO: 4.9 X10(3) UL (ref 4–11)

## 2020-08-29 PROCEDURE — C9113 INJ PANTOPRAZOLE SODIUM, VIA: HCPCS | Performed by: HOSPITALIST

## 2020-08-29 PROCEDURE — 80048 BASIC METABOLIC PNL TOTAL CA: CPT | Performed by: HOSPITALIST

## 2020-08-29 PROCEDURE — 0DB68ZX EXCISION OF STOMACH, VIA NATURAL OR ARTIFICIAL OPENING ENDOSCOPIC, DIAGNOSTIC: ICD-10-PCS | Performed by: INTERNAL MEDICINE

## 2020-08-29 PROCEDURE — 85025 COMPLETE CBC W/AUTO DIFF WBC: CPT | Performed by: HOSPITALIST

## 2020-08-29 PROCEDURE — 96376 TX/PRO/DX INJ SAME DRUG ADON: CPT

## 2020-08-29 PROCEDURE — 88305 TISSUE EXAM BY PATHOLOGIST: CPT | Performed by: INTERNAL MEDICINE

## 2020-08-29 PROCEDURE — 88312 SPECIAL STAINS GROUP 1: CPT | Performed by: INTERNAL MEDICINE

## 2020-08-29 RX ORDER — HYDROMORPHONE HYDROCHLORIDE 1 MG/ML
0.6 INJECTION, SOLUTION INTRAMUSCULAR; INTRAVENOUS; SUBCUTANEOUS EVERY 5 MIN PRN
Status: DISCONTINUED | OUTPATIENT
Start: 2020-08-29 | End: 2020-08-29 | Stop reason: HOSPADM

## 2020-08-29 RX ORDER — MORPHINE SULFATE 4 MG/ML
2 INJECTION, SOLUTION INTRAMUSCULAR; INTRAVENOUS EVERY 10 MIN PRN
Status: DISCONTINUED | OUTPATIENT
Start: 2020-08-29 | End: 2020-08-29 | Stop reason: HOSPADM

## 2020-08-29 RX ORDER — MIDAZOLAM HYDROCHLORIDE 1 MG/ML
INJECTION INTRAMUSCULAR; INTRAVENOUS AS NEEDED
Status: DISCONTINUED | OUTPATIENT
Start: 2020-08-29 | End: 2020-08-29 | Stop reason: SURG

## 2020-08-29 RX ORDER — HALOPERIDOL 5 MG/ML
0.25 INJECTION INTRAMUSCULAR ONCE AS NEEDED
Status: DISCONTINUED | OUTPATIENT
Start: 2020-08-29 | End: 2020-08-29 | Stop reason: HOSPADM

## 2020-08-29 RX ORDER — NALOXONE HYDROCHLORIDE 0.4 MG/ML
80 INJECTION, SOLUTION INTRAMUSCULAR; INTRAVENOUS; SUBCUTANEOUS AS NEEDED
Status: DISCONTINUED | OUTPATIENT
Start: 2020-08-29 | End: 2020-08-29 | Stop reason: HOSPADM

## 2020-08-29 RX ORDER — PANTOPRAZOLE SODIUM 40 MG/1
40 TABLET, DELAYED RELEASE ORAL
Status: DISCONTINUED | OUTPATIENT
Start: 2020-08-29 | End: 2020-08-30

## 2020-08-29 RX ORDER — HYDROMORPHONE HYDROCHLORIDE 1 MG/ML
0.2 INJECTION, SOLUTION INTRAMUSCULAR; INTRAVENOUS; SUBCUTANEOUS EVERY 5 MIN PRN
Status: DISCONTINUED | OUTPATIENT
Start: 2020-08-29 | End: 2020-08-29 | Stop reason: HOSPADM

## 2020-08-29 RX ORDER — ONDANSETRON 2 MG/ML
4 INJECTION INTRAMUSCULAR; INTRAVENOUS ONCE AS NEEDED
Status: DISCONTINUED | OUTPATIENT
Start: 2020-08-29 | End: 2020-08-29 | Stop reason: HOSPADM

## 2020-08-29 RX ORDER — PROCHLORPERAZINE EDISYLATE 5 MG/ML
5 INJECTION INTRAMUSCULAR; INTRAVENOUS ONCE AS NEEDED
Status: DISCONTINUED | OUTPATIENT
Start: 2020-08-29 | End: 2020-08-29 | Stop reason: HOSPADM

## 2020-08-29 RX ORDER — SODIUM CHLORIDE, SODIUM LACTATE, POTASSIUM CHLORIDE, CALCIUM CHLORIDE 600; 310; 30; 20 MG/100ML; MG/100ML; MG/100ML; MG/100ML
INJECTION, SOLUTION INTRAVENOUS CONTINUOUS
Status: DISCONTINUED | OUTPATIENT
Start: 2020-08-29 | End: 2020-08-30

## 2020-08-29 RX ORDER — SODIUM CHLORIDE, SODIUM LACTATE, POTASSIUM CHLORIDE, CALCIUM CHLORIDE 600; 310; 30; 20 MG/100ML; MG/100ML; MG/100ML; MG/100ML
INJECTION, SOLUTION INTRAVENOUS CONTINUOUS PRN
Status: DISCONTINUED | OUTPATIENT
Start: 2020-08-29 | End: 2020-08-29 | Stop reason: SURG

## 2020-08-29 RX ORDER — HYDROCODONE BITARTRATE AND ACETAMINOPHEN 5; 325 MG/1; MG/1
2 TABLET ORAL AS NEEDED
Status: DISCONTINUED | OUTPATIENT
Start: 2020-08-29 | End: 2020-08-29 | Stop reason: HOSPADM

## 2020-08-29 RX ORDER — MORPHINE SULFATE 4 MG/ML
4 INJECTION, SOLUTION INTRAMUSCULAR; INTRAVENOUS EVERY 10 MIN PRN
Status: DISCONTINUED | OUTPATIENT
Start: 2020-08-29 | End: 2020-08-29 | Stop reason: HOSPADM

## 2020-08-29 RX ORDER — HYDROCODONE BITARTRATE AND ACETAMINOPHEN 5; 325 MG/1; MG/1
1 TABLET ORAL AS NEEDED
Status: DISCONTINUED | OUTPATIENT
Start: 2020-08-29 | End: 2020-08-29 | Stop reason: HOSPADM

## 2020-08-29 RX ORDER — HYDROMORPHONE HYDROCHLORIDE 1 MG/ML
0.4 INJECTION, SOLUTION INTRAMUSCULAR; INTRAVENOUS; SUBCUTANEOUS EVERY 5 MIN PRN
Status: DISCONTINUED | OUTPATIENT
Start: 2020-08-29 | End: 2020-08-29 | Stop reason: HOSPADM

## 2020-08-29 RX ORDER — HYDROCODONE BITARTRATE AND ACETAMINOPHEN 10; 325 MG/1; MG/1
1 TABLET ORAL EVERY 4 HOURS PRN
Status: DISCONTINUED | OUTPATIENT
Start: 2020-08-29 | End: 2020-08-30

## 2020-08-29 RX ORDER — MORPHINE SULFATE 10 MG/ML
6 INJECTION, SOLUTION INTRAMUSCULAR; INTRAVENOUS EVERY 10 MIN PRN
Status: DISCONTINUED | OUTPATIENT
Start: 2020-08-29 | End: 2020-08-29 | Stop reason: HOSPADM

## 2020-08-29 RX ADMIN — SODIUM CHLORIDE, SODIUM LACTATE, POTASSIUM CHLORIDE, CALCIUM CHLORIDE: 600; 310; 30; 20 INJECTION, SOLUTION INTRAVENOUS at 09:03:00

## 2020-08-29 RX ADMIN — MIDAZOLAM HYDROCHLORIDE 2 MG: 1 INJECTION INTRAMUSCULAR; INTRAVENOUS at 09:13:00

## 2020-08-29 NOTE — PROGRESS NOTES
[facility] Hospitalist Progress Note     CC: Hospital Follow up    PCP: Fran Schaefer MD       Assessment/Plan:     Principal Problem:    Gastrointestinal hemorrhage, unspecified gastrointestinal hemorrhage type  Active Problems:    Gastrointestinal hemorrha Intake 11 ml   Output —   Net 11 ml       Last 3 Weights  08/29/20 0839 : 190 lb (86.2 kg)  08/28/20 1122 : 190 lb 0.6 oz (86.2 kg)  08/28/20 0945 : 190 lb 1.6 oz (86.2 kg)  08/10/20 1038 : 200 lb 3.2 oz (90.8 kg)      Exam   GEN: NAD  HEENT: EOMI, PERRL

## 2020-08-29 NOTE — INTERVAL H&P NOTE
Pre-op Diagnosis: GI bleed    The above referenced H&P was reviewed by Janine Leal MD on 8/29/2020, the patient was examined and no significant changes have occurred in the patient's condition since the H&P was performed.   I discussed with the patient and/

## 2020-08-29 NOTE — H&P
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 OBJECTIVE:  /86 (BP Location: Right arm)   Pulse 89   Temp 97.3 °F (36. Or 2 Views), Left (cpt=73560)    Result Date: 8/3/2020  PROCEDURE:  XR KNEE (1 OR 2 VIEWS), LEFT (CPT=73560)  COMPARISON:  [facility] , XR, XR KNEE ROUTINE (3 VIEWS), LEFT (CPT=73562), 10/14/2019, 3:02 PM.  INDICATIONS:  Left knee prosthesis  PATIENT STATED HI remission  GERD  eHTN  Fibromyalgia  HTN  Severe obesity with comorbid condition  Hx of malignant hyperthermia  LUCIA doesn't use cpap  OA s/p recent TKA c/b DVT    Plan:    Acute GIB:  -likely 2/2 recent DOAC  -hold AC for now - discussed risk/benefit with

## 2020-08-29 NOTE — PLAN OF CARE
Problem: Patient Centered Care  Goal: Patient preferences are identified and integrated in the patient's plan of care  Description  Interventions:  - What would you like us to know as we care for you?  From home with son, Roberta Barillas  - Provide timely, complete schedule  Outcome: Progressing     Problem: GASTROINTESTINAL - ADULT  Goal: Minimal or absence of nausea and vomiting  Description  INTERVENTIONS:  - Maintain adequate hydration with IV or PO as ordered and tolerated  - Nasogastric tube to low intermittent

## 2020-08-29 NOTE — PLAN OF CARE
Problem: Patient Centered Care  Goal: Patient preferences are identified and integrated in the patient's plan of care  Description  Interventions:  - What would you like us to know as we care for you?  From home with son, Roberta Barillas  - Provide timely, complete blood products/factors, fluids and medications as ordered and appropriate  - Administer supportive blood products/factors as ordered and appropriate  Outcome: Progressing     Alert and oriented x4. BP slightly elevated, will continue to monitor.  Currently

## 2020-08-29 NOTE — ANESTHESIA PREPROCEDURE EVALUATION
Anesthesia PreOp Note    HPI:     Rajni Munson is a 53year old female who presents for preoperative consultation requested by: Tootie Hirsch MD    Date of Surgery: 11/14/2019    Procedure(s):  BREAST RECONSTRUCTION SECOND STAGE/ REVISION  FAT GRAFTING  In • Essential hypertension    • Fibromyalgia    • Fibromyalgia    • High blood pressure    • Malignant hyperthermia     SLOW TO AWAKEN   • 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    • Migraines    • Osteoarthritis    • PONV (postoperative nausea and vomiting gabapentin (NEURONTIN) cap 600 mg, 600 mg, Oral, BID, Jada Thompson MD, 600 mg at 08/28/20 2150  gabapentin (NEURONTIN) cap 300 mg, 300 mg, Oral, [email or web address], Jada Thompson MD  hydrochlorothiazide (HYDRODIURIL) tab 12.5 mg, 12.5 mg, Oral, Daily, Evert Brunson Comment:Malignant hyperthermia    Family History   Problem Relation Age of Onset   • Heart Disease Father    • Pulmonary Disease Father    • Hypertension Father    • Heart Attack Father 62   • Cancer Father         Lung cancer   • Heart Disease Mother Active member of club or organization: Not on file        Attends meetings of clubs or organizations: Not on file        Relationship status: Not on file      Intimate partner violence:        Fear of current or ex partner: Not on file        Emotion vitals were not taken for this visit. There were no vitals filed for this visit.      Anesthesia Evaluation     Patient summary reviewed and Nursing notes reviewed    History of anesthetic complications   Airway   Mallampati: II  TM distance: >3 FB  Neck

## 2020-08-29 NOTE — RESPIRATORY THERAPY NOTE
Patient refused CPAP therapy. [facility] has educated the patient on the purpose of and need for this therapy as well as potential negative outcomes associated with deferring treatment.  Despite education, patient maintains refusal. Family state ,she doesn't use C

## 2020-08-29 NOTE — OPERATIVE REPORT
ENDOSCOPY OPERATIVE REPORT    Patient Name: Carlo Greenfield Record #: [id number]  YOB: 1965  Date of Procedure: 8/29/2020    Preoperative Diagnosis: nausea; history of dark stools (patient does report taking Pepto-bismol).    Postoperati and upon completion and throughtout the vital signs were stable. COMPLICATIONS: None. IMPRESSION: No GI contributing pathology identified for nausea.    PLAN: 1. ) trial of diet   2.) if sx persist, recommend CT-abd-pelvis    3.) recommend colonoscopy aft

## 2020-08-30 VITALS
BODY MASS INDEX: 34.96 KG/M2 | HEART RATE: 71 BPM | TEMPERATURE: 98 F | HEIGHT: 62 IN | RESPIRATION RATE: 18 BRPM | DIASTOLIC BLOOD PRESSURE: 76 MMHG | OXYGEN SATURATION: 92 % | WEIGHT: 190 LBS | SYSTOLIC BLOOD PRESSURE: 128 MMHG

## 2020-08-30 LAB
DEPRECATED RDW RBC AUTO: 44.9 FL (ref 35.1–46.3)
ERYTHROCYTE [DISTWIDTH] IN BLOOD BY AUTOMATED COUNT: 13.1 % (ref 11–15)
HCT VFR BLD AUTO: 34.2 % (ref 35–48)
HGB BLD-MCNC: 11.3 G/DL (ref 12–16)
MCH RBC QN AUTO: 30.7 PG (ref 26–34)
MCHC RBC AUTO-ENTMCNC: 33 G/DL (ref 31–37)
MCV RBC AUTO: 92.9 FL (ref 80–100)
PLATELET # BLD AUTO: 270 10(3)UL (ref 150–450)
RBC # BLD AUTO: 3.68 X10(6)UL (ref 3.8–5.3)
WBC # BLD AUTO: 4.1 X10(3) UL (ref 4–11)

## 2020-08-30 PROCEDURE — 85027 COMPLETE CBC AUTOMATED: CPT | Performed by: HOSPITALIST

## 2020-08-30 RX ORDER — PANTOPRAZOLE SODIUM 40 MG/1
40 TABLET, DELAYED RELEASE ORAL
Qty: 30 TABLET | Refills: 0 | Status: SHIPPED | OUTPATIENT
Start: 2020-08-31 | End: 2020-08-30

## 2020-08-30 NOTE — PLAN OF CARE
Problem: PAIN - ADULT  Goal: Verbalizes/displays adequate comfort level or patient's stated pain goal  Description  INTERVENTIONS:  - Encourage pt to monitor pain and request assistance  - Assess pain using appropriate pain scale  - Administer analgesics bleeding or hemorrhage  - Monitor labs and vital signs for trends  - Administer supportive blood products/factors, fluids and medications as ordered and appropriate  - Administer supportive blood products/factors as ordered and appropriate  Outcome: [place]

## 2020-08-30 NOTE — PROGRESS NOTES
To Whom it May Concern,     Ms Lorenza Koch is able to participate in physical therapy    Sincerely,     Dr Caio Manuel MD

## 2020-08-30 NOTE — PROGRESS NOTES
GI  PROGRESS NOTE    SUBJECTIVE: relates feels much better; no nausea; tolerating diet.        OBJECTIVE:  Temp:  [97.6 °F (36.4 °C)-97.8 °F (36.6 °C)] 97.8 °F (36.6 °C)  Pulse:  [71-87] 71  Resp:  [16-18] 18  BP: (128-142)/(76-82) 128/76  Exam  Gen: No a f/u with Dr. Che Ogden in 2-3 weeks. Stable for discharge from GI standpoint. Please call us as needed.    Dom Gallardo MD  [facility] Gastroenterology   _______________________________________________________________

## 2020-08-30 NOTE — DISCHARGE SUMMARY
General Medicine Discharge Summary     Patient ID:  Alana Calix  53year old  10/18/1965    Admit date: 8/28/2020    Discharge date and time: 8/30/20  Attending Physician: Shira Antoine MD     Consults: IP CONSULT TO FAMILY/INTERNAL MED  IP CONSULT TO Frank Farias MG Tabs  Commonly known as:  ZYRTEC     escitalopram 20 MG Tabs  Commonly known as:  LEXAPRO     ferrous sulfate 325 (65 FE) MG Tbec  Take 1 tablet (325 mg total) by mouth daily with breakfast.     gabapentin 300 MG Caps  Commonly known as:  NEURONTIN  Two

## 2020-08-30 NOTE — CM/SW NOTE
Patient failed inpatient criteria. Second level of review completed and supports observation. UR committee in agreement. Discussed with Dr. Stefani Ferrara  who approves observation status. MOON given to the patient and order written.

## 2020-08-30 NOTE — PROGRESS NOTES
PT discharged, family member provided transportation home. Discharge paperwork and prescriptions reviewed, all questions and concerns addressed. IV access discontinued.

## 2020-08-30 NOTE — PLAN OF CARE
Problem: Patient Centered Care  Goal: Patient preferences are identified and integrated in the patient's plan of care  Description  Interventions:  - What would you like us to know as we care for you?  From home with son, Malcom Pac  - Provide timely, complete schedule  Outcome: Progressing     Problem: GASTROINTESTINAL - ADULT  Goal: Minimal or absence of nausea and vomiting  Description  INTERVENTIONS:  - Maintain adequate hydration with IV or PO as ordered and tolerated  - Nasogastric tube to low intermittent

## 2020-09-02 NOTE — PROGRESS NOTES
Here are the biopsy/pathology findings from your recent EGD (Upper  Endoscopy): negative for H pylori baceria. If you need any further assistance, please feel free to call [phone]. Thank you for letting us care for you .     Sincerely,     Dimitris Floyd

## 2020-09-10 PROBLEM — F41.9 ANXIETY: Status: ACTIVE | Noted: 2020-09-10

## 2020-09-10 PROBLEM — M54.2 CERVICALGIA: Status: ACTIVE | Noted: 2020-09-10

## 2020-09-10 PROBLEM — M79.10 MYALGIA: Status: ACTIVE | Noted: 2020-09-10

## 2020-11-17 PROBLEM — G90.522 COMPLEX REGIONAL PAIN SYNDROME TYPE 1 OF LEFT LOWER EXTREMITY: Status: ACTIVE | Noted: 2020-11-17

## 2020-11-17 PROBLEM — G89.18 POSTOPERATIVE PAIN: Status: ACTIVE | Noted: 2020-11-17

## 2020-12-21 PROBLEM — M84.369A: Status: ACTIVE | Noted: 2020-12-21

## 2021-02-22 PROBLEM — K92.2 GASTROINTESTINAL HEMORRHAGE: Status: RESOLVED | Noted: 2020-08-28 | Resolved: 2021-02-22

## 2021-02-22 PROBLEM — I82.4Y9 ACUTE DEEP VEIN THROMBOSIS (DVT) OF PROXIMAL VEIN OF LOWER EXTREMITY, UNSPECIFIED LATERALITY (HCC): Status: RESOLVED | Noted: 2020-08-28 | Resolved: 2021-02-22

## 2021-02-23 PROBLEM — I77.819 AORTIC ECTASIA (HCC): Status: ACTIVE | Noted: 2021-02-23

## 2021-03-31 PROBLEM — M80.00XG AGE-RELATED OSTEOPOROSIS WITH CURRENT PATHOLOGICAL FRACTURE WITH DELAYED HEALING: Status: ACTIVE | Noted: 2021-03-31

## 2021-04-12 ENCOUNTER — IMMUNIZATION (OUTPATIENT)
Dept: LAB | Age: 56
End: 2021-04-12
Attending: HOSPITALIST
Payer: MEDICARE

## 2021-04-12 DIAGNOSIS — Z23 NEED FOR VACCINATION: Primary | ICD-10-CM

## 2021-04-12 PROCEDURE — 0001A SARSCOV2 VAC 30MCG/0.3ML IM: CPT

## 2021-05-03 ENCOUNTER — IMMUNIZATION (OUTPATIENT)
Dept: LAB | Age: 56
End: 2021-05-03
Attending: HOSPITALIST
Payer: MEDICARE

## 2021-05-03 DIAGNOSIS — Z23 NEED FOR VACCINATION: Primary | ICD-10-CM

## 2021-05-03 PROCEDURE — 0002A SARSCOV2 VAC 30MCG/0.3ML IM: CPT

## 2022-01-24 PROBLEM — K92.2 GASTROINTESTINAL HEMORRHAGE, UNSPECIFIED GASTROINTESTINAL HEMORRHAGE TYPE: Status: RESOLVED | Noted: 2020-08-28 | Resolved: 2022-01-24

## 2022-01-24 PROBLEM — I27.20 PULMONARY HYPERTENSION (HCC): Status: ACTIVE | Noted: 2022-01-24

## 2022-01-24 PROBLEM — M84.369A: Status: RESOLVED | Noted: 2020-12-21 | Resolved: 2022-01-24

## 2022-01-24 PROBLEM — I77.819 AORTIC ECTASIA (HCC): Status: RESOLVED | Noted: 2021-02-23 | Resolved: 2022-01-24

## 2022-01-24 PROBLEM — G89.18 POSTOPERATIVE PAIN: Status: RESOLVED | Noted: 2020-11-17 | Resolved: 2022-01-24

## 2022-01-24 PROBLEM — J84.9 ILD (INTERSTITIAL LUNG DISEASE) (HCC): Status: ACTIVE | Noted: 2022-01-24

## 2023-02-21 NOTE — BRIEF OP NOTE
Diagnosis per chart.  We will need to check lipids in the future.   Pre-Operative Diagnosis: BRCA gene positive [Z15.01, Z15.09]  Family history of breast cancer [Z80.3]     Post-Operative Diagnosis: BRCA gene positive [Z15.01, Z15.09]Family history of breast cancer [Z80.3]      Procedure Performed:   Procedure(s):  Filemon Hernandez

## 2023-03-07 ENCOUNTER — OFFICE VISIT (OUTPATIENT)
Dept: SURGERY | Facility: CLINIC | Age: 58
End: 2023-03-07
Payer: MEDICARE

## 2023-03-07 DIAGNOSIS — Z98.890 H/O BREAST RECONSTRUCTION: Primary | ICD-10-CM

## 2023-03-07 PROCEDURE — 99202 OFFICE O/P NEW SF 15 MIN: CPT | Performed by: SURGERY

## 2023-03-07 NOTE — PROGRESS NOTES
Jessica Benton is a 61year old female who presents today for a follow-up. She is a history of bilateral mastectomy in abdominal free flap reconstruction in 2019. She reports discomfort in bilateral breast left greater than right. She relates that she has gained weight over the past year. Physical Examination:  HEENT: There is no cervical or supraclavicular lymphadenopathy noted. Breasts: Bilateral breast flaps are warm and soft. Incisions are well-healed. There are no palpable nodules noted. There is no erythema or seroma noted. Mild tenderness to palpation is noted in the lateral aspect of bilateral breast in the area of the previous dogear excision. There is no axillary lymphadenopathy noted bilaterally. Assessment and Plan:  The patient was reassured that I see no abnormalities on examination today. The patient was encouraged to attempt weight loss as I believe offloading the breast would help her discomfort. We discussed returning for reevaluation in 3 to 6 months. The plan was reviewed with the patient and questions were answered.

## 2023-12-04 ENCOUNTER — OFFICE VISIT (OUTPATIENT)
Dept: SURGERY | Facility: CLINIC | Age: 58
End: 2023-12-04

## 2023-12-04 VITALS — BODY MASS INDEX: 35.88 KG/M2 | WEIGHT: 195 LBS | HEIGHT: 62 IN

## 2023-12-04 DIAGNOSIS — T88.3XXA MALIGNANT HYPERTHERMIA, INITIAL ENCOUNTER: Primary | ICD-10-CM

## 2023-12-04 DIAGNOSIS — K80.00 CHOLELITHIASIS AND ACUTE CHOLECYSTITIS WITHOUT OBSTRUCTION: ICD-10-CM

## 2023-12-04 PROCEDURE — 99204 OFFICE O/P NEW MOD 45 MIN: CPT | Performed by: SURGERY

## 2023-12-04 RX ORDER — ZOLPIDEM TARTRATE 5 MG/1
5 TABLET ORAL NIGHTLY PRN
COMMUNITY

## 2023-12-04 RX ORDER — OMEPRAZOLE 20 MG/1
20 CAPSULE, DELAYED RELEASE ORAL EVERY EVENING
COMMUNITY

## 2023-12-04 RX ORDER — GABAPENTIN 600 MG/1
600 TABLET ORAL 3 TIMES DAILY
COMMUNITY

## 2023-12-04 RX ORDER — CHOLECALCIFEROL (VITAMIN D3) 1250 MCG
1 CAPSULE ORAL WEEKLY
COMMUNITY

## 2023-12-04 RX ORDER — NORTRIPTYLINE HYDROCHLORIDE 75 MG/1
75 CAPSULE ORAL NIGHTLY
COMMUNITY

## 2023-12-04 RX ORDER — ACETAMINOPHEN 500 MG
500 TABLET ORAL EVERY 6 HOURS PRN
COMMUNITY

## 2023-12-04 RX ORDER — AMLODIPINE BESYLATE 5 MG/1
5 TABLET ORAL EVERY MORNING
COMMUNITY

## 2023-12-04 NOTE — H&P
Chief complaint:   Chief Complaint   Patient presents with    Gallbladder     Referred by Dr. Ulises Cortes for nausea/gallbladder issues. HPI: Courtney Serra is a pt with a h/o MALIGNANT HYPERTHERMIA has gallstones which are increasingly symptomatic, She has had significant RUQ pain and now has nausea as well. Diet has been modified to low fat, without improvement. She has gallstones on US. No h/o J/F/C, no melena. No PUD. CMP ok, elevated x alk phos.      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    BRCA gene positive 10/15/2018    Carrier of gene mutation for high risk of cancer 8/13/2018    Deep vein thrombosis (HCC)     Left femur 8/2020    Depression     Esophageal reflux     Essential hypertension     Fibromyalgia     Fibromyalgia     Gastrointestinal hemorrhage, unspecified gastrointestinal hemorrhage type 8/28/2020    High blood pressure     Malignant hyperthermia     SLOW TO AWAKEN    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     Migraines     Obstructive apnea 04/14/2021    DMG PSG AHI 28    Osteoarthritis     PONV (postoperative nausea and vomiting)     Postoperative pain 11/17/2020    Sleep apnea     does not use cpap    Stress fracture of lower leg, initial encounter 12/21/2020    Urge incontinence of urine     Urine incontinence     Visual impairment     readers       Past surgical history:   Past Surgical History:   Procedure Laterality Date    ARTHROSCOPY OF JOINT UNLISTED Right     KNEE    ARTHROSCOPY, ANKLE, SURGICAL; W/ANKLE ARTHRODESIS      BREAST RECONSTRUCTION Bilateral 10/15/2018    Breast Reconstruction with Bilateral Tissue Expander Placement    ENDOMETRIAL ABLATION      FRACTURE SURGERY Left     ELBOW    HYSTERECTOMY  2009    laparscopic Breonna nguyen LAPAROSCOPY,DIAGNOSTIC      MASTECTOMY LEFT      MASTECTOMY RIGHT      OTHER Right     KNEE ARTHROSCOPY    OTHER SURGICAL HISTORY  10/2018    bilateral simple mastectomies - Dr. Larsen Dust      C3-T1 FUSIONS    TOTAL KNEE REPLACEMENT Left 08/03/2020       Allergies: Allergies   Allergen Reactions    Anesthetics, Amide OTHER (SEE COMMENTS)     Malignant hyperthermia    Caffeine OTHER (SEE COMMENTS)     Causes swollen throat    Demerol Hcl [Meperidine] RASH    Succinylcholine OTHER (SEE COMMENTS) and FEVER     Malignant hyperthermia       Medications:   Current Outpatient Medications   Medication Sig Dispense Refill    Cholecalciferol (SM VITAMIN D3) 100 MCG (4000 UT) Oral Cap Take by mouth daily. DULoxetine HCl 40 MG Oral Cap DR Particles Take 1 capsule by mouth daily. amoxicillin 500 MG Oral Cap TAKE ONE CAPSULE BY MOUTH EVERY 8 HOURS UNTIL ALL TAKEN FOR 7 DAYS      ibuprofen 800 MG Oral Tab Take 800 mg by mouth every 6 (six) hours as needed. cyclobenzaprine 5 MG Oral Tab 1-2 tab po tid prn 180 tablet 3    Zolpidem Tartrate ER 12.5 MG Oral Tab CR Take 12.5 mg by mouth nightly as needed for Sleep. Potassium Chloride ER 20 MEQ Oral Tab CR Take 1 tablet by mouth daily. 90 tablet 3    losartan Potassium 50 MG Oral Tab Take 1 tablet (50 mg total) by mouth daily. 90 tablet 3    hydrochlorothiazide 12.5 MG Oral Tab Take 1 tablet (12.5 mg total) by mouth daily. 90 tablet 3    Calcium Citrate-Vitamin D (CALCIUM CITRATE + D OR) Take 500 mg by mouth 2 (two) times a day. Zoledronic Acid (RECLAST IV) Inject into the vein. Multiple Minerals-Vitamins (CALCIUM CITRATE +) Oral Tab Take 600 mg by mouth 2 (two) times a day. 0    Pantoprazole Sodium 40 MG Oral Tab EC Take 1 tablet (40 mg total) by mouth daily. Before meal 90 tablet 2    HYDROcodone-acetaminophen (NORCO) 5-325 MG Oral Tab Take 1 tablet by mouth every 6 (six) hours as needed for Pain.  40 tablet 0 cyclobenzaprine 5 MG Oral Tab 1-2 tab po bid prn 120 tablet 2    Oxybutynin Chloride 5 MG Oral Tab Take 1 tablet (5 mg total) by mouth 2 (two) times daily. 180 tablet 3    cetirizine 10 MG Oral Tab Take 10 mg by mouth daily. ferrous sulfate 325 (65 FE) MG Oral Tab EC Take 1 tablet (325 mg total) by mouth daily with breakfast. 30 tablet 0       Social history:   Social History     Socioeconomic History    Marital status:     Number of children: 1   Occupational History    Occupation: disability   Tobacco Use    Smoking status: Never    Smokeless tobacco: Never   Vaping Use    Vaping Use: Never used   Substance and Sexual Activity    Alcohol use:  Yes     Alcohol/week: 2.0 standard drinks of alcohol     Types: 2 Glasses of wine per week     Comment: very rare    Drug use: Never    Sexual activity: Not Currently     Partners: Male        Family history:  Family History   Problem Relation Age of Onset    Heart Disease Father     Pulmonary Disease Father     Hypertension Father     Heart Attack Father 62    Cancer Father         Lung cancer    Heart Disease Mother     Hypertension Mother     Cancer Mother         Uterine     Heart Attack Mother 54    Other (Other) Mother         Pulmonary Embolism x 3    Hypertension Sister     Cancer Sister         breast     Seizure Disorder Sister     Heart Disease Brother     Hypertension Brother     Seizure Disorder Brother     Heart Attack Brother 52    Other (Other) Sister         radial scars    DVT/VTE Other         Arm    Ovarian Cancer Neg         Review of Systems:   GENERAL: feels generally well  SKIN: no ulcerated or worrisome skin lesions  EYES:denies blurred vision or double vision  HEENT: denies new nasal congestion, sinus pain or ST  LUNGS: denies shortness of breath with exertion  CARDIOVASCULAR: denies chest pain on exertion  GI: no hematemesis, no BRBPR, no worsening heartburn  : no dysuria, no blood in urine, no difficulty urinating  MUSCULOSKELETAL: no new musculoskeletal complaints  NEURO: no persistent, recurrent  headaches  PSYCHE:no depression or anxiety  HEMATOLOGIC: no hx of blood dyscrasia  ENDOCRINE: no new endocrine problems  ALL/ASTHMA: no new hx of severe allergy or asthma  BACK: normal, no spinal deformity, no CVA tenderness    Physical examination:     Constitutional: appears well hydrated alert and responsive no acute distress noted  HEENT wnl, anicteric, PERRL, normocephalic, atraumatic  Neck supple, norm ROM, no JVD  L CTA B  H Reg rate  Abd soft, slt T RUQ, ND, no masses, no hernias, no HSM. Extr no c/c/e  Skin intact, no jaundice, no rashes, no lesions  Neuro grossly intact, no focal deficits, no tremors  Back no deformity, no CVA tnd. Assessment and plan:  Diagnoses and all orders for this visit:    Malignant hyperthermia, initial encounter    Cholelithiasis and acute cholecystitis without obstruction       MALIGNANT HYPERTHERMIA anesthesia consult. Plan lap gucci, possible open, possible IOC  We have discussed the surgical risks, benefits, alternatives, and expected recovery. All of the patient's questions have been answered to her satisfaction.       Alejo Marsh MD  12/4/2023  9:09 AM Eber Roberts (MD)  Orthopaedic Surgery  [address] [place] [address]  Phone: [phone]  Fax: [phone]  Follow Up Time: 1-3 Days

## 2023-12-04 NOTE — H&P (VIEW-ONLY)
Chief complaint:   Chief Complaint   Patient presents with    Gallbladder     Referred by Dr. Darleen Fothergill for nausea/gallbladder issues. HPI: Serafin is a pt with a h/o MALIGNANT HYPERTHERMIA has gallstones which are increasingly symptomatic, She has had significant RUQ pain and now has nausea as well. Diet has been modified to low fat, without improvement. She has gallstones on US. No h/o J/F/C, no melena. No PUD. CMP ok, elevated x alk phos.      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    BRCA gene positive 10/15/2018    Carrier of gene mutation for high risk of cancer 8/13/2018    Deep vein thrombosis (HCC)     Left femur 8/2020    Depression     Esophageal reflux     Essential hypertension     Fibromyalgia     Fibromyalgia     Gastrointestinal hemorrhage, unspecified gastrointestinal hemorrhage type 8/28/2020    High blood pressure     Malignant hyperthermia     SLOW TO AWAKEN    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     Migraines     Obstructive apnea 04/14/2021    DMG PSG AHI 28    Osteoarthritis     PONV (postoperative nausea and vomiting)     Postoperative pain 11/17/2020    Sleep apnea     does not use cpap    Stress fracture of lower leg, initial encounter 12/21/2020    Urge incontinence of urine     Urine incontinence     Visual impairment     readers       Past surgical history:   Past Surgical History:   Procedure Laterality Date    ARTHROSCOPY OF JOINT UNLISTED Right     KNEE    ARTHROSCOPY, ANKLE, SURGICAL; W/ANKLE ARTHRODESIS      BREAST RECONSTRUCTION Bilateral 10/15/2018    Breast Reconstruction with Bilateral Tissue Expander Placement    ENDOMETRIAL ABLATION      FRACTURE SURGERY Left     ELBOW    HYSTERECTOMY  2009    laparscopic [place] Card intact LAPAROSCOPY,DIAGNOSTIC      MASTECTOMY LEFT      MASTECTOMY RIGHT      OTHER Right     KNEE ARTHROSCOPY    OTHER SURGICAL HISTORY  10/2018    bilateral simple mastectomies - Dr. Horacio Patterson      C3-T1 FUSIONS    TOTAL KNEE REPLACEMENT Left 08/03/2020       Allergies: Allergies   Allergen Reactions    Anesthetics, Amide OTHER (SEE COMMENTS)     Malignant hyperthermia    Caffeine OTHER (SEE COMMENTS)     Causes swollen throat    Demerol Hcl [Meperidine] RASH    Succinylcholine OTHER (SEE COMMENTS) and FEVER     Malignant hyperthermia       Medications:   Current Outpatient Medications   Medication Sig Dispense Refill    Cholecalciferol (SM VITAMIN D3) 100 MCG (4000 UT) Oral Cap Take by mouth daily. DULoxetine HCl 40 MG Oral Cap DR Particles Take 1 capsule by mouth daily. amoxicillin 500 MG Oral Cap TAKE ONE CAPSULE BY MOUTH EVERY 8 HOURS UNTIL ALL TAKEN FOR 7 DAYS      ibuprofen 800 MG Oral Tab Take 800 mg by mouth every 6 (six) hours as needed. cyclobenzaprine 5 MG Oral Tab 1-2 tab po tid prn 180 tablet 3    Zolpidem Tartrate ER 12.5 MG Oral Tab CR Take 12.5 mg by mouth nightly as needed for Sleep. Potassium Chloride ER 20 MEQ Oral Tab CR Take 1 tablet by mouth daily. 90 tablet 3    losartan Potassium 50 MG Oral Tab Take 1 tablet (50 mg total) by mouth daily. 90 tablet 3    hydrochlorothiazide 12.5 MG Oral Tab Take 1 tablet (12.5 mg total) by mouth daily. 90 tablet 3    Calcium Citrate-Vitamin D (CALCIUM CITRATE + D OR) Take 500 mg by mouth 2 (two) times a day. Zoledronic Acid (RECLAST IV) Inject into the vein. Multiple Minerals-Vitamins (CALCIUM CITRATE +) Oral Tab Take 600 mg by mouth 2 (two) times a day. 0    Pantoprazole Sodium 40 MG Oral Tab EC Take 1 tablet (40 mg total) by mouth daily. Before meal 90 tablet 2    HYDROcodone-acetaminophen (NORCO) 5-325 MG Oral Tab Take 1 tablet by mouth every 6 (six) hours as needed for Pain.  40 tablet 0 cyclobenzaprine 5 MG Oral Tab 1-2 tab po bid prn 120 tablet 2    Oxybutynin Chloride 5 MG Oral Tab Take 1 tablet (5 mg total) by mouth 2 (two) times daily. 180 tablet 3    cetirizine 10 MG Oral Tab Take 10 mg by mouth daily. ferrous sulfate 325 (65 FE) MG Oral Tab EC Take 1 tablet (325 mg total) by mouth daily with breakfast. 30 tablet 0       Social history:   Social History     Socioeconomic History    Marital status:     Number of children: 1   Occupational History    Occupation: disability   Tobacco Use    Smoking status: Never    Smokeless tobacco: Never   Vaping Use    Vaping Use: Never used   Substance and Sexual Activity    Alcohol use:  Yes     Alcohol/week: 2.0 standard drinks of alcohol     Types: 2 Glasses of wine per week     Comment: very rare    Drug use: Never    Sexual activity: Not Currently     Partners: Male        Family history:  Family History   Problem Relation Age of Onset    Heart Disease Father     Pulmonary Disease Father     Hypertension Father     Heart Attack Father 62    Cancer Father         Lung cancer    Heart Disease Mother     Hypertension Mother     Cancer Mother         Uterine     Heart Attack Mother 54    Other (Other) Mother         Pulmonary Embolism x 3    Hypertension Sister     Cancer Sister         breast     Seizure Disorder Sister     Heart Disease Brother     Hypertension Brother     Seizure Disorder Brother     Heart Attack Brother 52    Other (Other) Sister         radial scars    DVT/VTE Other         Arm    Ovarian Cancer Neg         Review of Systems:   GENERAL: feels generally well  SKIN: no ulcerated or worrisome skin lesions  EYES:denies blurred vision or double vision  HEENT: denies new nasal congestion, sinus pain or ST  LUNGS: denies shortness of breath with exertion  CARDIOVASCULAR: denies chest pain on exertion  GI: no hematemesis, no BRBPR, no worsening heartburn  : no dysuria, no blood in urine, no difficulty urinating  MUSCULOSKELETAL: no new musculoskeletal complaints  NEURO: no persistent, recurrent  headaches  PSYCHE:no depression or anxiety  HEMATOLOGIC: no hx of blood dyscrasia  ENDOCRINE: no new endocrine problems  ALL/ASTHMA: no new hx of severe allergy or asthma  BACK: normal, no spinal deformity, no CVA tenderness    Physical examination:     Constitutional: appears well hydrated alert and responsive no acute distress noted  HEENT wnl, anicteric, PERRL, normocephalic, atraumatic  Neck supple, norm ROM, no JVD  L CTA B  H Reg rate  Abd soft, slt T RUQ, ND, no masses, no hernias, no HSM. Extr no c/c/e  Skin intact, no jaundice, no rashes, no lesions  Neuro grossly intact, no focal deficits, no tremors  Back no deformity, no CVA tnd. Assessment and plan:  Diagnoses and all orders for this visit:    Malignant hyperthermia, initial encounter    Cholelithiasis and acute cholecystitis without obstruction       MALIGNANT HYPERTHERMIA anesthesia consult. Plan lap gucci, possible open, possible IOC  We have discussed the surgical risks, benefits, alternatives, and expected recovery. All of the patient's questions have been answered to her satisfaction.       Maged Mohr MD  12/4/2023  9:09 AM

## 2023-12-05 ENCOUNTER — ANESTHESIA EVENT (OUTPATIENT)
Dept: SURGERY | Facility: HOSPITAL | Age: 58
End: 2023-12-05
Payer: MEDICARE

## 2023-12-05 ENCOUNTER — ANESTHESIA (OUTPATIENT)
Dept: SURGERY | Facility: HOSPITAL | Age: 58
End: 2023-12-05
Payer: MEDICARE

## 2023-12-05 ENCOUNTER — HOSPITAL ENCOUNTER (INPATIENT)
Facility: HOSPITAL | Age: 58
LOS: 1 days | Discharge: HOME | DRG: 419 | End: 2023-12-06
Attending: SURGERY | Admitting: SURGERY
Payer: MEDICARE

## 2023-12-05 ENCOUNTER — HOSPITAL ENCOUNTER (INPATIENT)
Facility: HOSPITAL | Age: 58
LOS: 1 days | Discharge: HOME | End: 2023-12-06
Attending: SURGERY | Admitting: SURGERY
Payer: MEDICARE

## 2023-12-05 DIAGNOSIS — N39.41 URGE INCONTINENCE: ICD-10-CM

## 2023-12-05 DIAGNOSIS — T88.3XXA MALIGNANT HYPERTHERMIA, INITIAL ENCOUNTER: ICD-10-CM

## 2023-12-05 DIAGNOSIS — K80.00 CHOLELITHIASIS AND ACUTE CHOLECYSTITIS WITHOUT OBSTRUCTION: ICD-10-CM

## 2023-12-05 PROCEDURE — 0FT44ZZ RESECTION OF GALLBLADDER, PERCUTANEOUS ENDOSCOPIC APPROACH: ICD-10-PCS | Performed by: SURGERY

## 2023-12-05 RX ORDER — HYDROMORPHONE HYDROCHLORIDE 1 MG/ML
0.4 INJECTION, SOLUTION INTRAMUSCULAR; INTRAVENOUS; SUBCUTANEOUS EVERY 5 MIN PRN
Status: DISCONTINUED | OUTPATIENT
Start: 2023-12-05 | End: 2023-12-05 | Stop reason: HOSPADM

## 2023-12-05 RX ORDER — ONDANSETRON 2 MG/ML
INJECTION INTRAMUSCULAR; INTRAVENOUS AS NEEDED
Status: DISCONTINUED | OUTPATIENT
Start: 2023-12-05 | End: 2023-12-05 | Stop reason: SURG

## 2023-12-05 RX ORDER — METOCLOPRAMIDE 10 MG/1
10 TABLET ORAL ONCE
Status: DISCONTINUED | OUTPATIENT
Start: 2023-12-05 | End: 2023-12-05 | Stop reason: HOSPADM

## 2023-12-05 RX ORDER — ONDANSETRON 2 MG/ML
4 INJECTION INTRAMUSCULAR; INTRAVENOUS EVERY 6 HOURS PRN
Status: DISCONTINUED | OUTPATIENT
Start: 2023-12-05 | End: 2023-12-06

## 2023-12-05 RX ORDER — HYDROCODONE BITARTRATE AND ACETAMINOPHEN 5; 325 MG/1; MG/1
2 TABLET ORAL EVERY 4 HOURS PRN
Status: DISCONTINUED | OUTPATIENT
Start: 2023-12-05 | End: 2023-12-06

## 2023-12-05 RX ORDER — MORPHINE SULFATE 4 MG/ML
4 INJECTION, SOLUTION INTRAMUSCULAR; INTRAVENOUS EVERY 10 MIN PRN
Status: DISCONTINUED | OUTPATIENT
Start: 2023-12-05 | End: 2023-12-05 | Stop reason: HOSPADM

## 2023-12-05 RX ORDER — MORPHINE SULFATE 10 MG/ML
6 INJECTION, SOLUTION INTRAMUSCULAR; INTRAVENOUS EVERY 10 MIN PRN
Status: CANCELLED | OUTPATIENT
Start: 2023-12-05

## 2023-12-05 RX ORDER — HYDROCODONE BITARTRATE AND ACETAMINOPHEN 5; 325 MG/1; MG/1
1 TABLET ORAL EVERY 6 HOURS PRN
Qty: 12 TABLET | Refills: 0 | Status: SHIPPED | OUTPATIENT
Start: 2023-12-05

## 2023-12-05 RX ORDER — ACETAMINOPHEN 325 MG/1
650 TABLET ORAL EVERY 4 HOURS PRN
Status: DISCONTINUED | OUTPATIENT
Start: 2023-12-05 | End: 2023-12-06

## 2023-12-05 RX ORDER — NALOXONE HYDROCHLORIDE 0.4 MG/ML
0.08 INJECTION, SOLUTION INTRAMUSCULAR; INTRAVENOUS; SUBCUTANEOUS AS NEEDED
Status: DISCONTINUED | OUTPATIENT
Start: 2023-12-05 | End: 2023-12-05 | Stop reason: HOSPADM

## 2023-12-05 RX ORDER — DULOXETIN HYDROCHLORIDE 20 MG/1
40 CAPSULE, DELAYED RELEASE ORAL 2 TIMES DAILY
Status: DISCONTINUED | OUTPATIENT
Start: 2023-12-05 | End: 2023-12-06

## 2023-12-05 RX ORDER — EPHEDRINE SULFATE 50 MG/ML
INJECTION, SOLUTION INTRAVENOUS AS NEEDED
Status: DISCONTINUED | OUTPATIENT
Start: 2023-12-05 | End: 2023-12-05 | Stop reason: SURG

## 2023-12-05 RX ORDER — MORPHINE SULFATE 4 MG/ML
2 INJECTION, SOLUTION INTRAMUSCULAR; INTRAVENOUS EVERY 10 MIN PRN
Status: CANCELLED | OUTPATIENT
Start: 2023-12-05

## 2023-12-05 RX ORDER — SCOLOPAMINE TRANSDERMAL SYSTEM 1 MG/1
1 PATCH, EXTENDED RELEASE TRANSDERMAL ONCE
Status: DISCONTINUED | OUTPATIENT
Start: 2023-12-05 | End: 2023-12-05

## 2023-12-05 RX ORDER — PROCHLORPERAZINE EDISYLATE 5 MG/ML
5 INJECTION INTRAMUSCULAR; INTRAVENOUS EVERY 8 HOURS PRN
Status: CANCELLED | OUTPATIENT
Start: 2023-12-05

## 2023-12-05 RX ORDER — HYDROMORPHONE HYDROCHLORIDE 1 MG/ML
0.6 INJECTION, SOLUTION INTRAMUSCULAR; INTRAVENOUS; SUBCUTANEOUS EVERY 5 MIN PRN
Status: CANCELLED | OUTPATIENT
Start: 2023-12-05

## 2023-12-05 RX ORDER — GLYCOPYRROLATE 0.2 MG/ML
INJECTION, SOLUTION INTRAMUSCULAR; INTRAVENOUS AS NEEDED
Status: DISCONTINUED | OUTPATIENT
Start: 2023-12-05 | End: 2023-12-05 | Stop reason: SURG

## 2023-12-05 RX ORDER — NEOSTIGMINE METHYLSULFATE 1 MG/ML
INJECTION, SOLUTION INTRAVENOUS AS NEEDED
Status: DISCONTINUED | OUTPATIENT
Start: 2023-12-05 | End: 2023-12-05 | Stop reason: SURG

## 2023-12-05 RX ORDER — LABETALOL HYDROCHLORIDE 5 MG/ML
INJECTION, SOLUTION INTRAVENOUS AS NEEDED
Status: DISCONTINUED | OUTPATIENT
Start: 2023-12-05 | End: 2023-12-05 | Stop reason: SURG

## 2023-12-05 RX ORDER — HYDROMORPHONE HYDROCHLORIDE 1 MG/ML
0.2 INJECTION, SOLUTION INTRAMUSCULAR; INTRAVENOUS; SUBCUTANEOUS EVERY 5 MIN PRN
Status: DISCONTINUED | OUTPATIENT
Start: 2023-12-05 | End: 2023-12-05 | Stop reason: HOSPADM

## 2023-12-05 RX ORDER — SODIUM CHLORIDE, SODIUM LACTATE, POTASSIUM CHLORIDE, CALCIUM CHLORIDE 600; 310; 30; 20 MG/100ML; MG/100ML; MG/100ML; MG/100ML
INJECTION, SOLUTION INTRAVENOUS CONTINUOUS
Status: DISCONTINUED | OUTPATIENT
Start: 2023-12-05 | End: 2023-12-05 | Stop reason: HOSPADM

## 2023-12-05 RX ORDER — ZOLPIDEM TARTRATE 5 MG/1
5 TABLET ORAL NIGHTLY PRN
Status: DISCONTINUED | OUTPATIENT
Start: 2023-12-05 | End: 2023-12-06

## 2023-12-05 RX ORDER — HYDROCODONE BITARTRATE AND ACETAMINOPHEN 5; 325 MG/1; MG/1
1 TABLET ORAL EVERY 4 HOURS PRN
Status: DISCONTINUED | OUTPATIENT
Start: 2023-12-05 | End: 2023-12-06

## 2023-12-05 RX ORDER — FAMOTIDINE 20 MG/1
20 TABLET, FILM COATED ORAL ONCE
Status: DISCONTINUED | OUTPATIENT
Start: 2023-12-05 | End: 2023-12-05 | Stop reason: HOSPADM

## 2023-12-05 RX ORDER — MORPHINE SULFATE 4 MG/ML
2 INJECTION, SOLUTION INTRAMUSCULAR; INTRAVENOUS EVERY 10 MIN PRN
Status: DISCONTINUED | OUTPATIENT
Start: 2023-12-05 | End: 2023-12-05 | Stop reason: HOSPADM

## 2023-12-05 RX ORDER — HYDROMORPHONE HYDROCHLORIDE 1 MG/ML
0.2 INJECTION, SOLUTION INTRAMUSCULAR; INTRAVENOUS; SUBCUTANEOUS EVERY 5 MIN PRN
Status: CANCELLED | OUTPATIENT
Start: 2023-12-05

## 2023-12-05 RX ORDER — BUPIVACAINE HYDROCHLORIDE AND EPINEPHRINE 5; 5 MG/ML; UG/ML
INJECTION, SOLUTION PERINEURAL AS NEEDED
Status: DISCONTINUED | OUTPATIENT
Start: 2023-12-05 | End: 2023-12-05 | Stop reason: HOSPADM

## 2023-12-05 RX ORDER — MIDAZOLAM HYDROCHLORIDE 1 MG/ML
INJECTION INTRAMUSCULAR; INTRAVENOUS AS NEEDED
Status: DISCONTINUED | OUTPATIENT
Start: 2023-12-05 | End: 2023-12-05 | Stop reason: SURG

## 2023-12-05 RX ORDER — MORPHINE SULFATE 10 MG/ML
6 INJECTION, SOLUTION INTRAMUSCULAR; INTRAVENOUS EVERY 10 MIN PRN
Status: DISCONTINUED | OUTPATIENT
Start: 2023-12-05 | End: 2023-12-05 | Stop reason: HOSPADM

## 2023-12-05 RX ORDER — DEXAMETHASONE SODIUM PHOSPHATE 4 MG/ML
VIAL (ML) INJECTION AS NEEDED
Status: DISCONTINUED | OUTPATIENT
Start: 2023-12-05 | End: 2023-12-05 | Stop reason: SURG

## 2023-12-05 RX ORDER — HYDROCODONE BITARTRATE AND ACETAMINOPHEN 5; 325 MG/1; MG/1
1 TABLET ORAL EVERY 6 HOURS PRN
Status: DISCONTINUED | OUTPATIENT
Start: 2023-12-05 | End: 2023-12-06

## 2023-12-05 RX ORDER — ROCURONIUM BROMIDE 10 MG/ML
INJECTION, SOLUTION INTRAVENOUS AS NEEDED
Status: DISCONTINUED | OUTPATIENT
Start: 2023-12-05 | End: 2023-12-05 | Stop reason: SURG

## 2023-12-05 RX ORDER — HEPARIN SODIUM 5000 [USP'U]/ML
5000 INJECTION, SOLUTION INTRAVENOUS; SUBCUTANEOUS EVERY 8 HOURS SCHEDULED
Status: DISCONTINUED | OUTPATIENT
Start: 2023-12-06 | End: 2023-12-06

## 2023-12-05 RX ORDER — METOCLOPRAMIDE HYDROCHLORIDE 5 MG/ML
10 INJECTION INTRAMUSCULAR; INTRAVENOUS ONCE
Status: DISCONTINUED | OUTPATIENT
Start: 2023-12-05 | End: 2023-12-05 | Stop reason: HOSPADM

## 2023-12-05 RX ORDER — SODIUM CHLORIDE, SODIUM LACTATE, POTASSIUM CHLORIDE, CALCIUM CHLORIDE 600; 310; 30; 20 MG/100ML; MG/100ML; MG/100ML; MG/100ML
INJECTION, SOLUTION INTRAVENOUS CONTINUOUS
Status: CANCELLED | OUTPATIENT
Start: 2023-12-05

## 2023-12-05 RX ORDER — PANTOPRAZOLE SODIUM 20 MG/1
20 TABLET, DELAYED RELEASE ORAL
Status: DISCONTINUED | OUTPATIENT
Start: 2023-12-06 | End: 2023-12-06

## 2023-12-05 RX ORDER — CETIRIZINE HYDROCHLORIDE 10 MG/1
10 TABLET ORAL EVERY MORNING
Status: DISCONTINUED | OUTPATIENT
Start: 2023-12-06 | End: 2023-12-06

## 2023-12-05 RX ORDER — GABAPENTIN 600 MG/1
600 TABLET ORAL 3 TIMES DAILY
Status: DISCONTINUED | OUTPATIENT
Start: 2023-12-05 | End: 2023-12-06

## 2023-12-05 RX ORDER — MORPHINE SULFATE 4 MG/ML
4 INJECTION, SOLUTION INTRAMUSCULAR; INTRAVENOUS EVERY 10 MIN PRN
Status: CANCELLED | OUTPATIENT
Start: 2023-12-05

## 2023-12-05 RX ORDER — ONDANSETRON 2 MG/ML
4 INJECTION INTRAMUSCULAR; INTRAVENOUS EVERY 6 HOURS PRN
Status: CANCELLED | OUTPATIENT
Start: 2023-12-05

## 2023-12-05 RX ORDER — ERGOCALCIFEROL 1.25 MG/1
50000 CAPSULE ORAL WEEKLY
Status: DISCONTINUED | OUTPATIENT
Start: 2023-12-10 | End: 2023-12-06

## 2023-12-05 RX ORDER — NALOXONE HYDROCHLORIDE 0.4 MG/ML
80 INJECTION, SOLUTION INTRAMUSCULAR; INTRAVENOUS; SUBCUTANEOUS AS NEEDED
Status: CANCELLED | OUTPATIENT
Start: 2023-12-05 | End: 2023-12-06

## 2023-12-05 RX ORDER — AMLODIPINE BESYLATE 5 MG/1
5 TABLET ORAL EVERY MORNING
Status: DISCONTINUED | OUTPATIENT
Start: 2023-12-06 | End: 2023-12-06

## 2023-12-05 RX ORDER — DIPHENHYDRAMINE HYDROCHLORIDE 50 MG/ML
25 INJECTION INTRAMUSCULAR; INTRAVENOUS EVERY 4 HOURS PRN
Status: DISCONTINUED | OUTPATIENT
Start: 2023-12-05 | End: 2023-12-06

## 2023-12-05 RX ORDER — HYDROMORPHONE HYDROCHLORIDE 1 MG/ML
0.6 INJECTION, SOLUTION INTRAMUSCULAR; INTRAVENOUS; SUBCUTANEOUS EVERY 5 MIN PRN
Status: DISCONTINUED | OUTPATIENT
Start: 2023-12-05 | End: 2023-12-05 | Stop reason: HOSPADM

## 2023-12-05 RX ORDER — SODIUM CHLORIDE, SODIUM LACTATE, POTASSIUM CHLORIDE, CALCIUM CHLORIDE 600; 310; 30; 20 MG/100ML; MG/100ML; MG/100ML; MG/100ML
INJECTION, SOLUTION INTRAVENOUS CONTINUOUS
Status: DISCONTINUED | OUTPATIENT
Start: 2023-12-05 | End: 2023-12-06

## 2023-12-05 RX ORDER — FAMOTIDINE 10 MG/ML
20 INJECTION, SOLUTION INTRAVENOUS ONCE
Status: DISCONTINUED | OUTPATIENT
Start: 2023-12-05 | End: 2023-12-05 | Stop reason: HOSPADM

## 2023-12-05 RX ORDER — ACETAMINOPHEN 500 MG
1000 TABLET ORAL ONCE
Status: COMPLETED | OUTPATIENT
Start: 2023-12-05 | End: 2023-12-05

## 2023-12-05 RX ORDER — CEFAZOLIN SODIUM/WATER 2 G/20 ML
2 SYRINGE (ML) INTRAVENOUS ONCE
Status: COMPLETED | OUTPATIENT
Start: 2023-12-05 | End: 2023-12-05

## 2023-12-05 RX ORDER — HYDROMORPHONE HYDROCHLORIDE 1 MG/ML
0.4 INJECTION, SOLUTION INTRAMUSCULAR; INTRAVENOUS; SUBCUTANEOUS EVERY 5 MIN PRN
Status: CANCELLED | OUTPATIENT
Start: 2023-12-05

## 2023-12-05 RX ORDER — KETAMINE HYDROCHLORIDE 50 MG/ML
INJECTION, SOLUTION, CONCENTRATE INTRAMUSCULAR; INTRAVENOUS AS NEEDED
Status: DISCONTINUED | OUTPATIENT
Start: 2023-12-05 | End: 2023-12-05 | Stop reason: SURG

## 2023-12-05 RX ADMIN — EPHEDRINE SULFATE 10 MG: 50 INJECTION, SOLUTION INTRAVENOUS at 16:35:00

## 2023-12-05 RX ADMIN — ONDANSETRON 4 MG: 2 INJECTION INTRAMUSCULAR; INTRAVENOUS at 15:41:00

## 2023-12-05 RX ADMIN — ROCURONIUM BROMIDE 50 MG: 10 INJECTION, SOLUTION INTRAVENOUS at 15:41:00

## 2023-12-05 RX ADMIN — GLYCOPYRROLATE 0.8 MG: 0.2 INJECTION, SOLUTION INTRAMUSCULAR; INTRAVENOUS at 17:24:00

## 2023-12-05 RX ADMIN — SODIUM CHLORIDE, SODIUM LACTATE, POTASSIUM CHLORIDE, CALCIUM CHLORIDE: 600; 310; 30; 20 INJECTION, SOLUTION INTRAVENOUS at 17:27:00

## 2023-12-05 RX ADMIN — DEXAMETHASONE SODIUM PHOSPHATE 8 MG: 4 MG/ML VIAL (ML) INJECTION at 15:41:00

## 2023-12-05 RX ADMIN — KETAMINE HYDROCHLORIDE 20 MG: 50 INJECTION, SOLUTION, CONCENTRATE INTRAMUSCULAR; INTRAVENOUS at 16:16:00

## 2023-12-05 RX ADMIN — NEOSTIGMINE METHYLSULFATE 4 MG: 1 INJECTION, SOLUTION INTRAVENOUS at 17:24:00

## 2023-12-05 RX ADMIN — ROCURONIUM BROMIDE 10 MG: 10 INJECTION, SOLUTION INTRAVENOUS at 17:00:00

## 2023-12-05 RX ADMIN — MIDAZOLAM HYDROCHLORIDE 2 MG: 1 INJECTION INTRAMUSCULAR; INTRAVENOUS at 15:39:00

## 2023-12-05 RX ADMIN — LABETALOL HYDROCHLORIDE 10 MG: 5 INJECTION, SOLUTION INTRAVENOUS at 16:17:00

## 2023-12-05 RX ADMIN — CEFAZOLIN SODIUM/WATER 2 G: 2 G/20 ML SYRINGE (ML) INTRAVENOUS at 15:47:00

## 2023-12-05 NOTE — INTERVAL H&P NOTE
Pre-op Diagnosis: Malignant hyperthermia, initial encounter [T88. 3XXA]  Cholelithiasis and acute cholecystitis without obstruction [K80.00]    The above referenced H&P was reviewed by Margarita Mitchell MD on 12/5/2023, the patient was examined and no significant changes have occurred in the patient's condition since the H&P was performed. I discussed with the patient and/or legal representative the potential benefits, risks and side effects of this procedure; the likelihood of the patient achieving goals; and potential problems that might occur during recuperation. I discussed reasonable alternatives to the procedure, including risks, benefits and side effects related to the alternatives and risks related to not receiving this procedure. We will proceed with procedure as planned.

## 2023-12-05 NOTE — ANESTHESIA PROCEDURE NOTES
Airway  Date/Time: 12/5/2023 3:43 PM  Urgency: Elective    Airway not difficult    General Information and Staff    Patient location during procedure: OR  Anesthesiologist: Kye Hodge MD  Performed: anesthesiologist   Performed by: Kye Hodge MD  Authorized by: Kye Hodge MD      Indications and Patient Condition  Indications for airway management: anesthesia  Spontaneous Ventilation: absent  Sedation level: deep  Preoxygenated: yes  Patient position: sniffing  Mask difficulty assessment: 1 - vent by mask    Final Airway Details  Final airway type: endotracheal airway      Successful airway: ETT  Cuffed: yes   Successful intubation technique: direct laryngoscopy  Endotracheal tube insertion site: oral  Blade: GlideScope  Blade size: #3  ETT size (mm): 7.0    Cormack-Lehane Classification: grade IIA - partial view of glottis  Placement verified by: capnometry   Measured from: teeth  Number of attempts at approach: 1

## 2023-12-05 NOTE — ANESTHESIA POSTPROCEDURE EVALUATION
Patient: Shelley Malloy    Procedure Summary       Date: 12/05/23 Room / Location: [address] [place] [address] MAIN OR 12 / [address] [place] [address] MAIN OR    Anesthesia Start: 6383 Anesthesia Stop:     Procedure: Laparoscopic cholecystectomy, (Abdomen) Diagnosis:       Malignant hyperthermia, initial encounter      Cholelithiasis and acute cholecystitis without obstruction      (Malignant hyperthermia, initial encounter [T88. 3XXA]Cholelithiasis and acute cholecystitis without obstruction [K80.00])    Surgeons: Martin Fritz MD Anesthesiologist: Renetta Maya MD    Anesthesia Type: general ASA Status: 3            Anesthesia Type: general    Vitals Value Taken Time   /80 12/05/23 1747   Temp 97.6 12/05/23 1749   Pulse 75 12/05/23 1749   Resp 23 12/05/23 1749   SpO2 98 % 12/05/23 1749   Vitals shown include unfiled device data.     [address] [place] [address] AN Post Evaluation:   Patient Evaluated in PACU  Patient Participation: complete - patient participated  Level of Consciousness: awake  Pain Score: 2  Pain Management: adequate  Airway Patency:patent  Dental exam unchanged from preop  Yes    Cardiovascular Status: acceptable  Respiratory Status: acceptable and face mask  Postoperative Hydration acceptable      Juliet Nagy MD  12/5/2023 5:49 PM

## 2023-12-05 NOTE — OPERATIVE REPORT
Patient Name:  Ana Luisa Iyer  MR:  [id number]  :  10/18/1965  DOS:  23    Preop Dx: Malignant hyperthermia, initial encounter [T88. 3XXA]  Cholelithiasis and acute cholecystitis without obstruction [K80.00]  Postop Dx: Malignant hyperthermia, initial encounter [T88. 3XXA]Cholelithiasis and acute cholecystitis without obstruction [K80.00]  Procedure:  Laparoscopic cholecystectomy  Surgeon:  Dierdre Spurling, MD  Surgical Assistant.: Radha Elam CSA  EBL: Blood Output: 50 mL (2023  8:28 PM)    Complication:  None    INDICATION:  Pt is a 60year old female who has symptomatic cholelithiasis and history of cholecystitis with a history of Malignant hyperthermia. who is scheduled for a Laparoscopic cholecystectomy,. CONSENT:  An informed consent discussion was held with the patient regarding the nature of Malignant hyperthermia, initial encounter [T88. 3XXA]  Cholelithiasis and acute cholecystitis without obstruction [K80.00], the treatment options and the details of the procedure. The risks including but not limited to bleeding, wound infection, intra-abdominal infection, injury to the liver, colon, small intestine, pancreas, stomach, common bile duct, incomplete resection, cystic duct stump leak, retained stone and incisional hernia were discussed. The patient expressed understanding and want to proceed with the planned procedure. TECHNIQUE:  The patient was taken to the OR and placed in supine position. General anesthesia was established and the abdomen was prepped in standard fashion. Pneumoperitoneum was obtained using open technique through a supra-umbilical incision. A 12-mm trocar was inserted under direct visualization and no injury occurred. Examination of the abdomen showed a thickened and inflamed appearing gallbladder consistent with Malignant hyperthermia, initial encounter [T88. 3XXA]  Cholelithiasis and acute cholecystitis without obstruction [K80.00].   Two 5-mm trocars were placed in the epigastric and right abdomen. The patient was placed in reverse Trendelenburg position. The gallbladder was grasped and retracted cephalad and to the right. The peritoneum along the medial and lateral aspect of the gallbladder/liver edge were freed with the [facility], small lymphatics were divided using the hook cautery. The lower 1/3 of the gallbladder was dissected from the liver. Two structures, identified as the cystic duct and artery, were visualized circumferentially with the 30 degree lens and noted to be entering the infundibulum, thus obtaining a critical view of safety. The cystic duct and artery were clipped and divided. The gallbladder was detached from the liver using hook cautery and the liver bed was friable and bled easily. The bleeding was controlled with cautery as we progressed and surgicel was placed on the liver bed to maintain hemostasis. The gallbladder was delivered from the abdomen using an endocatch bag. Dr. Alex Gilbert scrubbed in as we removed the surgicel to inspect for hemostasis. He cauterized bleeding spots on the liver bed. I placed fresh surgicel on the liver bed and observed for hemostasis for several minutes. Once hemostasis was assured,  The abdominal cavity was irrigated with saline and found to be hemostatic. The trocars were removed under direct visualization and no port site bleeding was seen. The supra-umbilical fascia was closed using 0 vicryl. The skin incisions were closed using 4-0 vicryl. Sterile dressings were applied. All instrument and sponge counts were correct. I was present during the entire procedure and performed the operation.

## 2023-12-06 VITALS
SYSTOLIC BLOOD PRESSURE: 111 MMHG | HEIGHT: 62 IN | OXYGEN SATURATION: 90 % | TEMPERATURE: 98 F | HEART RATE: 89 BPM | RESPIRATION RATE: 18 BRPM | BODY MASS INDEX: 37.36 KG/M2 | DIASTOLIC BLOOD PRESSURE: 69 MMHG | WEIGHT: 203 LBS

## 2023-12-06 PROCEDURE — 99239 HOSP IP/OBS DSCHRG MGMT >30: CPT | Performed by: HOSPITALIST

## 2023-12-06 RX ORDER — OXYBUTYNIN CHLORIDE 5 MG/1
5 TABLET ORAL AS NEEDED
Status: SHIPPED | COMMUNITY
Start: 2023-12-06

## 2023-12-06 NOTE — PLAN OF CARE
Problem: Patient Centered Care  Goal: Patient preferences are identified and integrated in the patient's plan of care  Description: Interventions:  - What would you like us to know as we care for you?   - Provide timely, complete, and accurate information to patient/family  - Incorporate patient and family knowledge, values, beliefs, and cultural backgrounds into the planning and delivery of care  - Encourage patient/family to participate in care and decision-making at the level they choose  - Honor patient and family perspectives and choices  Outcome: Progressing    Problem: SAFETY ADULT - FALL  Goal: Free from fall injury  Description: INTERVENTIONS:  - Assess pt frequently for physical needs  - Identify cognitive and physical deficits and behaviors that affect risk of falls.   - [place] fall precautions as indicated by assessment.  - Educate pt/family on patient safety including physical limitations  - Instruct pt to call for assistance with activity based on assessment  - Modify environment to reduce risk of injury  - Provide assistive devices as appropriate  - Consider OT/PT consult to assist with strengthening/mobility  - Encourage toileting schedule  Outcome: Progressing    Problem: Patient/Family Goals  Goal: Patient/Family Long Term Goal  Description: Patient's Long Term Goal:     Interventions:    - See additional Care Plan goals for specific interventions  Outcome: Progressing    Goal: Patient/Family Short Term Goal  Description: Patient's Short Term Goal:     Interventions:   - See additional Care Plan goals for specific interventions  Outcome: Progressing    Problem: Patient/Family Goals  Goal: Patient/Family Short Term Goal  Description: Patient's Short Term Goal:     Interventions:   - See additional Care Plan goals for specific interventions  Outcome: Progressing     Problem: PAIN - ADULT  Goal: Verbalizes/displays adequate comfort level or patient's stated pain goal  Description: INTERVENTIONS:  - Encourage pt to monitor pain and request assistance  - Assess pain using appropriate pain scale  - Administer analgesics based on type and severity of pain and evaluate response  - Implement non-pharmacological measures as appropriate and evaluate response  - Consider cultural and social influences on pain and pain management  - Manage/alleviate anxiety  - Utilize distraction and/or relaxation techniques  - Monitor for opioid side effects  - Notify MD/LIP if interventions unsuccessful or patient reports new pain  - Anticipate increased pain with activity and pre-medicate as appropriate  Outcome: Progressing     No acute changes today. Advanced to low fiber soft diet, tolerating. Minimal complaints of pain, norco as needed. Voiding freely. Up sba/walker. Plans to discharge home later today. Call light within reach, frequent rounding.

## 2023-12-06 NOTE — PLAN OF CARE
Patient alert orientated by 4. Patient ambulating to restroom with walker. Patient pain managed by oxycodone 2.5 oral Q-6 PO. Patient has not had a BM at this time. Patient has personal items and call light within reach. Safety measures in place. Problem: Patient Centered Care  Goal: Patient preferences are identified and integrated in the patient's plan of care  Description: Interventions:  - What would you like us to know as we care for you? I come from home with   - Provide timely, complete, and accurate information to patient/family  - Incorporate patient and family knowledge, values, beliefs, and cultural backgrounds into the planning and delivery of care  - Encourage patient/family to participate in care and decision-making at the level they choose  - Honor patient and family perspectives and choices  Outcome: Progressing     Problem: SAFETY ADULT - FALL  Goal: Free from fall injury  Description: INTERVENTIONS:  - Assess pt frequently for physical needs  - Identify cognitive and physical deficits and behaviors that affect risk of falls.   - [place] fall precautions as indicated by assessment.  - Educate pt/family on patient safety including physical limitations  - Instruct pt to call for assistance with activity based on assessment  - Modify environment to reduce risk of injury  - Provide assistive devices as appropriate  - Consider OT/PT consult to assist with strengthening/mobility  - Encourage toileting schedule  Outcome: Progressing

## 2023-12-06 NOTE — RESPIRATORY THERAPY NOTE
Patient refused cpap therapy. [facility] has educated the patient on the purpose of and need for this therapy as well as potential negative outcomes associated with deferring treatment.  Despite education, patient maintains refusal.

## 2023-12-21 ENCOUNTER — OFFICE VISIT (OUTPATIENT)
Dept: SURGERY | Facility: CLINIC | Age: 58
End: 2023-12-21

## 2023-12-21 VITALS — BODY MASS INDEX: 37.36 KG/M2 | HEIGHT: 62 IN | WEIGHT: 203 LBS

## 2023-12-21 DIAGNOSIS — Z98.890 POST-OPERATIVE STATE: Primary | ICD-10-CM

## 2023-12-21 RX ORDER — ONDANSETRON 4 MG/1
4 TABLET, FILM COATED ORAL EVERY 8 HOURS PRN
Qty: 12 TABLET | Refills: 0 | Status: SHIPPED | OUTPATIENT
Start: 2023-12-21

## 2023-12-21 NOTE — DISCHARGE SUMMARY
[facility] HOSPITALIST  DISCHARGE SUMMARY     Miesha Garcia Patient Status:  Inpatient    10/18/1965 MRN [id number]   Location [facility] 4W/SW/SE Attending No att. providers found   Hosp Day # 1 PCP Anthony Lyn MD     Date of Admission: 2023  Date of Discharge: 2023  Discharge Disposition: Home or Self Care    Admitting Chief Complaint:   Malignant hyperthermia, initial encounter [T88. 3XXA]  Cholelithiasis and acute cholecystitis without obstruction [K80.00]  Malignant hyperthermia, initial encounter    PCP: Anthony Lyn MD    Discharge Diagnosis:   Cholelithiasis and acute cholecystitis without obstruction   S/p laparoscopic cholecystectomy         History of Present Illness:   Per Dr. Gregoria Comerle is a pt with a h/o MALIGNANT HYPERTHERMIA, has gallstones which are increasingly symptomatic, She has had significant RUQ pain and now has nausea as well. Diet has been modified to low fat, without improvement. She has gallstones on US. No h/o J/F/C, no melena. No PUD. CMP ok, elevated x alk phos. Admitted for cholecystectomy. Brief Synopsis:   Pt underwent lap gucci by Dr. Larry Bah on , post operatively monitored on surgical floor, recovering well.  home           Discharge Medication List:     Discharge Medications        START taking these medications        Instructions Prescription details   HYDROcodone-acetaminophen 5-325 MG Tabs  Commonly known as: Norco      Take 1 tablet by mouth every 6 (six) hours as needed. Quantity: 12 tablet  Refills: 0            CHANGE how you take these medications        Instructions Prescription details   hydroCHLOROthiazide 12.5 MG Tabs  Commonly known as: HYDRODIURIL  What changed: when to take this      Take 1 tablet (12.5 mg total) by mouth daily. Quantity: 90 tablet  Refills: 3     losartan 50 MG Tabs  Commonly known as: Cozaar  What changed: when to take this      Take 1 tablet (50 mg total) by mouth daily.    Quantity: 90 tablet  Refills: 3     Potassium Chloride ER 20 MEQ Tbcr  What changed: when to take this      Take 1 tablet by mouth daily. Quantity: 90 tablet  Refills: 3            CONTINUE taking these medications        Instructions Prescription details   acetaminophen 500 MG Tabs  Commonly known as: Tylenol Extra Strength      Take 1 tablet (500 mg total) by mouth every 6 (six) hours as needed for Pain. Refills: 0     amLODIPine 5 MG Tabs  Commonly known as: Norvasc      Take 1 tablet (5 mg total) by mouth every morning. Refills: 0     cetirizine 10 MG Tabs  Commonly known as: ZyrTEC      Take 1 tablet (10 mg total) by mouth every morning. Refills: 0     cyclobenzaprine 5 MG Tabs  Commonly known as: Flexeril      1-2 tab po bid prn   Quantity: 120 tablet  Refills: 2     DULoxetine HCl 40 MG Cpep      Take 1 capsule by mouth 2 (two) times daily. Refills: 0     gabapentin 600 MG Tabs  Commonly known as: Neurontin      Take 1 tablet (600 mg total) by mouth 3 (three) times daily. Refills: 0     Nortriptyline HCl 75 MG Caps  Commonly known as: PAMELOR      Take 1 capsule (75 mg total) by mouth nightly. Refills: 0     omeprazole 20 MG Cpdr  Commonly known as: PriLOSEC      Take 1 capsule (20 mg total) by mouth every evening. Refills: 0     oxybutynin 5 MG Tabs  Commonly known as: Ditropan      Take 1 tablet (5 mg total) by mouth as needed. Refills: 0     Vitamin D3 1.25 MG (62638 UT) Caps      Take 1 capsule by mouth once a week. Q Sun   Refills: 0     zolpidem 5 MG Tabs  Commonly known as: Ambien      Take 1 tablet (5 mg total) by mouth nightly as needed for Sleep.  Pt takes 1.5tab total of 7.5mg at HS as needed   Refills: 0               Where to Get Your Medications        These medications were sent to [address] [place] [address] [id number] [facility] [phone], [phone]  [facility] [address], [place] [address]      Phone: [phone]   HYDROcodone-acetaminophen 5-325 MG Tabs         Follow-up appointment:   Evelyn Ahumada LUCIAN  [address] [place]  [phone]    Schedule an appointment as soon as possible for a visit in 2 week(s)  for postoperative appointment    Janina Nuñez MD  [address] [facility]  [phone]    Follow up  Follow up within a week      Vital signs:       Physical Exam:    General: No acute distress. Respiratory: Clear to auscultation bilaterally. No wheezes. No rhonchi. Cardiovascular: S1, S2. Regular rate and rhythm. No murmurs, rubs or gallops. Abdomen: Soft, mildly tender, nondistended. Positive bowel sounds. No rebound or guarding. Neurologic: No new focal neurological deficits. Musculoskeletal: Moves all extremities. Extremities: No edema. -----------------------------------------------------------------------------------------------  PATIENT DISCHARGE INSTRUCTIONS: See electronic chart    I d/w pt the available results, management plan, medications changes, and discharge instructions including follow ups as outlined above. Hospital Discharge Diagnoses:  s/p gucci    Lace+ Score: 39  59-90 High Risk  29-58 Medium Risk  0-28   Low Risk. TCM Follow-Up Recommendation:  LACE 29-58:  Moderate Risk of readmission after discharge from the hospital.        Preston Thompson MD 12/20/2023    Time spent:  > 30 minutes

## 2024-02-27 ENCOUNTER — EKG ENCOUNTER (OUTPATIENT)
Dept: LAB | Facility: HOSPITAL | Age: 59
End: 2024-02-27
Attending: ORTHOPAEDIC SURGERY
Payer: MEDICARE

## 2024-02-27 DIAGNOSIS — M17.11 PRIMARY OSTEOARTHRITIS OF RIGHT KNEE: ICD-10-CM

## 2024-02-27 LAB
ANTIBODY SCREEN: NEGATIVE
ATRIAL RATE: 72 BPM
CHLORIDE SERPL-SCNC: 105 MMOL/L (ref 98–112)
CO2 SERPL-SCNC: 29 MMOL/L (ref 21–32)
P AXIS: 30 DEGREES
P-R INTERVAL: 186 MS
POTASSIUM SERPL-SCNC: 3.1 MMOL/L (ref 3.5–5.1)
Q-T INTERVAL: 440 MS
QRS DURATION: 70 MS
QTC CALCULATION (BEZET): 481 MS
R AXIS: 0 DEGREES
RH BLOOD TYPE: POSITIVE
SODIUM SERPL-SCNC: 139 MMOL/L (ref 136–145)
T AXIS: 21 DEGREES
VENTRICULAR RATE: 72 BPM

## 2024-02-27 PROCEDURE — 93010 ELECTROCARDIOGRAM REPORT: CPT | Performed by: INTERNAL MEDICINE

## 2024-02-27 PROCEDURE — 87081 CULTURE SCREEN ONLY: CPT

## 2024-02-27 PROCEDURE — 86900 BLOOD TYPING SEROLOGIC ABO: CPT

## 2024-02-27 PROCEDURE — 80051 ELECTROLYTE PANEL: CPT

## 2024-02-27 PROCEDURE — 36415 COLL VENOUS BLD VENIPUNCTURE: CPT

## 2024-02-27 PROCEDURE — 93005 ELECTROCARDIOGRAM TRACING: CPT

## 2024-02-27 PROCEDURE — 86850 RBC ANTIBODY SCREEN: CPT

## 2024-02-27 PROCEDURE — 86901 BLOOD TYPING SEROLOGIC RH(D): CPT

## 2024-03-05 ENCOUNTER — ANESTHESIA EVENT (OUTPATIENT)
Dept: SURGERY | Facility: HOSPITAL | Age: 59
End: 2024-03-05
Payer: MEDICARE

## 2024-03-17 NOTE — ANESTHESIA PREPROCEDURE EVALUATION
PRE-OP EVALUATION    Patient Name: Denisse Galvez    Admit Diagnosis: RIGHT KNEE OSTEOARTHRITIS    Pre-op Diagnosis: RIGHT KNEE OSTEOARTHRITIS    RIGHT TOTAL KNEE REPLACEMENT    Anesthesia Procedure: RIGHT TOTAL KNEE REPLACEMENT (Right)    Surgeon(s) and Role:     * Preston Whitehead MD - Primary    Pre-op vitals reviewed.        Body mass index is 37.13 kg/m².    Current medications reviewed.  Hospital Medications:  No current facility-administered medications on file as of .       Outpatient Medications:     No medications prior to admission.       Allergies: Anesthetics, amide; Caffeine; Demerol hcl [meperidine]; and Succinylcholine      Anesthesia Evaluation    Patient summary reviewed.    Anesthetic Complications  (+) history of anesthetic complications  History of: PONV       GI/Hepatic/Renal      (+) GERD                           Cardiovascular      ECG reviewed.            (+) hypertension                                     Endo/Other                           (+) arthritis       Pulmonary                    (+) sleep apnea and CPAP      Neuro/Psych      (+) depression  (+) anxiety         (+) neuromuscular disease             Absence of breast, acquired, bilateral Age-related osteoporosis with current pathological fracture with delayed healing  Anemia, unspecified type Anxiety  Cervicalgia Complex regional pain syndrome type 1 of left lower extremity  Essential hypertension Fibromyalgia  High alkaline phosphatase ILD (interstitial lung disease) (HCC)  Malignant hyperthermia, initial encounter OA (osteoarthritis) of knee  Other insomnia Pulmonary hypertension (HCC)  Recurrent major depressive disorder, in remission (HCC) Transaminitis  Urge incontinence Vitamin D deficiency            Past Surgical History:   Procedure Laterality Date    ARTHROSCOPY OF JOINT UNLISTED Right     KNEE    ARTHROSCOPY, ANKLE, SURGICAL; W/ANKLE ARTHRODESIS      BREAST RECONSTRUCTION Bilateral 10/15/2018    Breast Reconstruction with  Bilateral Tissue Expander Placement    CHOLECYSTECTOMY      ENDOMETRIAL ABLATION      FRACTURE SURGERY Left     ELBOW    HYSTERECTOMY  2009    laparscopic /ovaries intact     LAPAROSCOPY,DIAGNOSTIC      MASTECTOMY LEFT      MASTECTOMY RIGHT      OTHER Right     KNEE ARTHROSCOPY    OTHER SURGICAL HISTORY  10/2018    bilateral simple mastectomies - Dr. Catie Vargas    SPINE SURGERY PROCEDURE UNLISTED      C3-T1 FUSIONS    TOTAL KNEE REPLACEMENT Left 08/03/2020     Social History     Socioeconomic History    Marital status:     Number of children: 1   Occupational History    Occupation: disability   Tobacco Use    Smoking status: Never    Smokeless tobacco: Never   Vaping Use    Vaping Use: Never used   Substance and Sexual Activity    Alcohol use: Not Currently     Comment: very rare    Drug use: Never    Sexual activity: Not Currently     Partners: Male   Other Topics Concern     Service No    Caffeine Concern No    Hobby Hazards No    Stress Concern Yes    Special Diet No    Exercise No    Seat Belt Yes     History   Drug Use Unknown     Available pre-op labs reviewed.     Lab Results   Component Value Date     02/27/2024    K 3.1 (L) 02/27/2024     02/27/2024    CO2 29.0 02/27/2024            Airway      Mallampati: I  Mouth opening: >3 FB    Neck ROM: full Cardiovascular    Cardiovascular exam normal.         Dental    Dentition appears grossly intact         Pulmonary    Pulmonary exam normal.                 Other findings              ASA: 3   Plan: spinal  NPO status verified and     Post-procedure pain management plan discussed with surgeon and patient.      Plan/risks discussed with: patient                Present on Admission:  **None**

## 2024-03-17 NOTE — H&P
Denisse Galvez  3/14/2024 10:30 AM   Office Visit  MRN: [id number]  Description: 58 year old female Provider: July Valencia MD Department: [facility]     Scanning Cover Sheet    Click to print Barcode Encounter Cover Sheet for scanning  Office Visit  3/14/2024  Internal Medicine - [address], July Soriano MD  Internal Medicine Hypertensive heart disease with chronic diastolic congestive heart failure (HCC) +6 more  Dx Pre-Op Exam   Reason for Visit     Reason for Visit    Pre-Op Exam Pre-op exam     Progress Notes  July Valencia MD (Physician)  Internal Medicine  Expand All Collapse All  Patient presents with:  Pre-Op Exam: Pre-op exam           HPI:   Pt complains of            No exertional cp, sob, palpitations, julian, orthopnea/pnd.  Able to perform >4 mets without symptoms.     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    Carrier of gene mutation for high risk of cancer 08/13/2018    Cervicalgia 09/10/2020    Deep vein thrombosis (HCC)       Left femur 8/2020    Depression      Esophageal reflux      Essential hypertension      Fibromyalgia      Fibromyalgia      Gastrointestinal hemorrhage, unspecified gastrointestinal hemorrhage type 08/28/2020    High blood pressure      High cholesterol 08/15/2022    History of fracture of tibia 07/23/2022     Left tibia periprosthetic fracture 2020    Malignant hyperthermia       SLOW TO AWAKEN    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      Migraines      OA (osteoarthritis) of knee 02/07/2019    Obstructive apnea 04/14/2021     DMG PSG  AHI 28    Osteoarthritis      PONV (postoperative nausea and vomiting)      Postoperative pain 11/17/2020    Sleep apnea       does not use cpap    Stress fracture of lower leg, initial encounter 12/21/2020    Transaminitis 07/13/2020    Urge incontinence of urine      Urine incontinence      Visual impairment       readers            Past Surgical History:   Procedure Laterality Date    ARTHROSCOPY OF JOINT UNLISTED Right       KNEE    ARTHROSCOPY, ANKLE, SURGICAL; W/ANKLE ARTHRODESIS        BREAST RECONSTRUCTION Bilateral 10/15/2018     Breast Reconstruction with Bilateral Tissue Expander Placement    ENDOMETRIAL ABLATION        FRACTURE SURGERY Left       ELBOW    HYSTERECTOMY   2009     laparscopic /ovaries intact     LAPAROSCOPY,DIAGNOSTIC        MASTECTOMY LEFT        MASTECTOMY RIGHT        OTHER Right       KNEE ARTHROSCOPY    OTHER SURGICAL HISTORY   10/2018     bilateral simple mastectomies - Dr. Catie Vargas    SPINE SURGERY PROCEDURE UNLISTED         C3-T1 FUSIONS    TOTAL KNEE REPLACEMENT Left 08/03/2020            Family History   Problem Relation Age of Onset    Heart Disease Father      Pulmonary Disease Father      Hypertension Father      Heart Attack Father 57    Cancer Father           Lung cancer    Heart Disease Mother      Hypertension Mother      Cancer Mother           Uterine     Heart Attack Mother 55    Other (Other) Mother           Pulmonary Embolism x 3    Hypertension Sister      Cancer Sister           breast     Seizure Disorder Sister      Heart Disease Brother      Hypertension Brother      Seizure Disorder Brother      Heart Attack Brother 49    Other (Other) Sister           radial scars    DVT/VTE Other           Arm    Ovarian Cancer Neg        Social History    Tobacco Use      Smoking status: Never      Smokeless tobacco: Never    Vaping Use      Vaping Use: Never used    Alcohol use: Yes      Alcohol/week: 2.0 standard drinks of alcohol      Types: 2 Glasses of wine per  week      Comment: very rare    Drug use: Never                 Current Outpatient Medications   Medication Instructions    acetaminophen (TYLENOL EXTRA STRENGTH) 500 mg, Oral, 4 times daily    amLODIPine (NORVASC) 5 mg, Oral, Daily    Calcium Citrate-Vitamin D (CALCIUM CITRATE + D OR) 500 mg, Oral, 2 times daily    celecoxib (CELEBREX) 200 mg, Oral, Daily    cetirizine (ZYRTEC) 10 mg, Oral, Daily    cyclobenzaprine 5 MG Oral Tab 1-2 tab po bid prn    docusate sodium (COLACE) 100 mg, Oral, 2 times daily    DULoxetine HCl 40 MG Oral Cap DR Particles 1 capsule, Oral, Daily    ferrous sulfate 325 mg, Oral, Daily with breakfast    gabapentin (NEURONTIN) 600 mg, Oral, 3 times daily    hydrochlorothiazide 12.5 mg, Oral, Daily    losartan (COZAAR) 50 mg, Oral, Daily    Multiple Minerals-Vitamins (CALCIUM CITRATE +) Oral Tab 600 mg, Oral, 2 times daily    nortriptyline (PAMELOR) 25 mg, Oral, Nightly    omeprazole (PRILOSEC) 20 mg, Oral, Every evening    ondansetron (ZOFRAN) 4 mg, Oral, Every 8 hours PRN    oxyCODONE 5 mg, Oral, Every 6 hours PRN    potassium chloride 20 MEQ Oral Tab CR 20 mEq, Oral, Daily    rivaroxaban (XARELTO) 10 mg, Oral, Daily with food    traMADol (ULTRAM) 50 mg, Oral, Every 6 hours PRN    Zolpidem Tartrate ER (AMBIEN CR) 7.5 mg, Oral, Nightly PRN         Anesthetics, Amide      OTHER (SEE COMMENTS)    Comment:Malignant hyperthermia  Caffeine                OTHER (SEE COMMENTS)    Comment:Causes swollen throat  Demerol Hcl [Meperi*    RASH  Succinylcholine         OTHER (SEE COMMENTS), FEVER    Comment:Malignant hyperthermia             EXAM:      Vitals     03/14/24  1047   BP: 120/70   Pulse: 88   Resp: 18   Temp: 96.7 °F (35.9 °C)   TempSrc: Temporal   SpO2: 98%   Weight: 205 lb 6.4 oz (93.2 kg)   Height: 5' 2\" (1.575 m)            GENERAL:  no apparent distress  HEENT: atraumatic, normocephalic  EYES:anicteric, conjunctiva are clear  NECK: supple,no adenopathy  LUNGS: clear to  auscultation  CARDIO: RRR without murmur  GI: no masses, HSM or tenderness  EXTREMITIES: no edema        EKG done outside institution.  Read as non spec st t wave / unchanged compared to 2020  ASSESSMENT AND PLAN:   Patient presents with:  Pre-Op Exam: Pre-op exam     1. Hypertensive heart disease with chronic diastolic congestive heart failure (HCC) (Primary)  Overview:  htn controlled  On amlodipine / hydrochlorothiazide / potassium / losartan  No sx of chf.     2. High cholesterol  Overview:  Ascvd 3.3 %  +fam hx cad  Coronary angiogram in 2018  No statin     3. Other insomnia  Overview:  Stable on ambien  4. Recurrent major depressive disorder, in remission (HCC)  Overview:  Stable on duloxetine, gabapentin, nortryptyline  5. Complex regional pain syndrome type 1 of left lower extremity  Overview:  Worseing pain.  Seeing physiatry.  on duloxetine, gabapentin, nortryptyline  6. Fibromyalgia  Overview:  Stable  Stable on duloxetine, gabapentin, nortryptyline  7. LUCIA (obstructive sleep apnea)  Overview:  Non compliant with cpap              Pt is an increase risk for surgery d/t sleep apnea and mmp for this low risk surgery.  Pt is optimally managed medially.  This consult was sent back the referring physician, .           Instructions    After Visit Summary (Automatic SnapShot taken 3/14/2024)  Additional Documentation    Vitals: /70     Pulse 88     Temp 96.7 °F (35.9 °C) (Temporal)     Resp 18     Ht 5' 2\" (1.575 m)     Wt 205 lb 6.4 oz (93.2 kg)     SpO2 98%     BMI 37.57 kg/m²     BSA 1.93 m²          More Vitals   Flowsheets: DMG TEMP FOR BP BPA COMPARE,     Energy Needs   Encounter Info: Billing Info,     History,     Allergies,     Detailed Report     Communications    View All Conversations on this Encounter  Oncology History    Encounter Status    Electronically signed by July Valencia MD on 3/14/24 at 11:48 AM      ADDENDUM:    The above referenced H&P was reviewed by Preston Whitehead MD on  3/18/2024, the patient was examined and no significant changes have occurred in the patient's condition since the H&P was performed.  I discussed with the patient and/or legal representative the potential benefits, risks and side effects of this procedure; the likelihood of the patient achieving goals; and potential problems that might occur during recuperation.  I discussed reasonable alternatives to the procedure, including risks, benefits and side effects related to the alternatives and risks related to not receiving this procedure.  We will proceed with procedure as planned.

## 2024-03-18 ENCOUNTER — HOSPITAL ENCOUNTER (OUTPATIENT)
Facility: HOSPITAL | Age: 59
Discharge: HOME HEALTH | End: 2024-03-19
Attending: ORTHOPAEDIC SURGERY | Admitting: ORTHOPAEDIC SURGERY
Payer: MEDICARE

## 2024-03-18 ENCOUNTER — ANESTHESIA (OUTPATIENT)
Dept: SURGERY | Facility: HOSPITAL | Age: 59
End: 2024-03-18
Payer: MEDICARE

## 2024-03-18 ENCOUNTER — APPOINTMENT (OUTPATIENT)
Dept: GENERAL RADIOLOGY | Facility: HOSPITAL | Age: 59
End: 2024-03-18
Attending: ORTHOPAEDIC SURGERY
Payer: MEDICARE

## 2024-03-18 DIAGNOSIS — M17.11 PRIMARY OSTEOARTHRITIS OF RIGHT KNEE: Primary | ICD-10-CM

## 2024-03-18 PROCEDURE — 88305 TISSUE EXAM BY PATHOLOGIST: CPT | Performed by: ORTHOPAEDIC SURGERY

## 2024-03-18 PROCEDURE — 97530 THERAPEUTIC ACTIVITIES: CPT

## 2024-03-18 PROCEDURE — 96376 TX/PRO/DX INJ SAME DRUG ADON: CPT

## 2024-03-18 PROCEDURE — 76942 ECHO GUIDE FOR BIOPSY: CPT | Performed by: ANESTHESIOLOGY

## 2024-03-18 PROCEDURE — 88311 DECALCIFY TISSUE: CPT | Performed by: ORTHOPAEDIC SURGERY

## 2024-03-18 PROCEDURE — 97162 PT EVAL MOD COMPLEX 30 MIN: CPT

## 2024-03-18 PROCEDURE — 96374 THER/PROPH/DIAG INJ IV PUSH: CPT

## 2024-03-18 PROCEDURE — 0SRC0J9 REPLACEMENT OF RIGHT KNEE JOINT WITH SYNTHETIC SUBSTITUTE, CEMENTED, OPEN APPROACH: ICD-10-PCS | Performed by: ORTHOPAEDIC SURGERY

## 2024-03-18 PROCEDURE — 73560 X-RAY EXAM OF KNEE 1 OR 2: CPT | Performed by: ORTHOPAEDIC SURGERY

## 2024-03-18 DEVICE — ATTUNE KNEE SYSTEM FEMORAL CRUCIATE RETAINING NARROW SIZE 5N RIGHT CEMENTED
Type: IMPLANTABLE DEVICE | Site: KNEE | Status: FUNCTIONAL
Brand: ATTUNE

## 2024-03-18 DEVICE — SMARTSET HIGH PERFORMANCE MV MEDIUM VISCOSITY BONE CEMENT 40G
Type: IMPLANTABLE DEVICE | Site: KNEE | Status: FUNCTIONAL
Brand: SMARTSET

## 2024-03-18 DEVICE — ATTUNE KNEE SYSTEM TIBIAL INSERT FIXED BEARING MEDIAL STABILIZED RIGHT AOX 5, 8MM
Type: IMPLANTABLE DEVICE | Site: KNEE | Status: FUNCTIONAL
Brand: ATTUNE

## 2024-03-18 DEVICE — ATTUNE PATELLA MEDIALIZED ANATOMIC 35MM CEMENTED AOX
Type: IMPLANTABLE DEVICE | Site: KNEE | Status: FUNCTIONAL
Brand: ATTUNE

## 2024-03-18 DEVICE — ATTUNE KNEE SYSTEM TIBIAL BASE FIXED BEARING SIZE 5 CEMENTED
Type: IMPLANTABLE DEVICE | Site: KNEE | Status: FUNCTIONAL
Brand: ATTUNE

## 2024-03-18 RX ORDER — TRAMADOL HYDROCHLORIDE 50 MG/1
50 TABLET ORAL EVERY 6 HOURS PRN
Status: ON HOLD | COMMUNITY
End: 2024-03-18

## 2024-03-18 RX ORDER — TRAMADOL HYDROCHLORIDE 50 MG/1
50 TABLET ORAL EVERY 6 HOURS SCHEDULED
Status: DISCONTINUED | OUTPATIENT
Start: 2024-03-18 | End: 2024-03-19

## 2024-03-18 RX ORDER — ACETAMINOPHEN 500 MG
1000 TABLET ORAL ONCE AS NEEDED
Status: DISCONTINUED | OUTPATIENT
Start: 2024-03-18 | End: 2024-03-18 | Stop reason: HOSPADM

## 2024-03-18 RX ORDER — HYDROMORPHONE HYDROCHLORIDE 1 MG/ML
0.2 INJECTION, SOLUTION INTRAMUSCULAR; INTRAVENOUS; SUBCUTANEOUS EVERY 5 MIN PRN
Status: CANCELLED | OUTPATIENT
Start: 2024-03-18 | End: 2024-03-18

## 2024-03-18 RX ORDER — HYDROCODONE BITARTRATE AND ACETAMINOPHEN 10; 325 MG/1; MG/1
2 TABLET ORAL ONCE AS NEEDED
Status: DISCONTINUED | OUTPATIENT
Start: 2024-03-18 | End: 2024-03-18 | Stop reason: HOSPADM

## 2024-03-18 RX ORDER — MIDAZOLAM HYDROCHLORIDE 1 MG/ML
1 INJECTION INTRAMUSCULAR; INTRAVENOUS EVERY 5 MIN PRN
Status: DISCONTINUED | OUTPATIENT
Start: 2024-03-18 | End: 2024-03-18 | Stop reason: HOSPADM

## 2024-03-18 RX ORDER — GABAPENTIN 600 MG/1
600 TABLET ORAL 3 TIMES DAILY
Status: DISCONTINUED | OUTPATIENT
Start: 2024-03-18 | End: 2024-03-19

## 2024-03-18 RX ORDER — ACETAMINOPHEN 500 MG
1000 TABLET ORAL ONCE
Status: DISCONTINUED | OUTPATIENT
Start: 2024-03-18 | End: 2024-03-18 | Stop reason: HOSPADM

## 2024-03-18 RX ORDER — FAMOTIDINE 10 MG/ML
20 INJECTION, SOLUTION INTRAVENOUS 2 TIMES DAILY
Status: DISCONTINUED | OUTPATIENT
Start: 2024-03-18 | End: 2024-03-18

## 2024-03-18 RX ORDER — DIPHENHYDRAMINE HCL 25 MG
25 CAPSULE ORAL EVERY 4 HOURS PRN
Status: DISCONTINUED | OUTPATIENT
Start: 2024-03-18 | End: 2024-03-19

## 2024-03-18 RX ORDER — HYDROMORPHONE HYDROCHLORIDE 1 MG/ML
0.4 INJECTION, SOLUTION INTRAMUSCULAR; INTRAVENOUS; SUBCUTANEOUS EVERY 5 MIN PRN
Status: DISCONTINUED | OUTPATIENT
Start: 2024-03-18 | End: 2024-03-18 | Stop reason: HOSPADM

## 2024-03-18 RX ORDER — CEFAZOLIN SODIUM/WATER 2 G/20 ML
2 SYRINGE (ML) INTRAVENOUS EVERY 8 HOURS
Qty: 40 ML | Refills: 0 | Status: COMPLETED | OUTPATIENT
Start: 2024-03-18 | End: 2024-03-19

## 2024-03-18 RX ORDER — TRAMADOL HYDROCHLORIDE 50 MG/1
50 TABLET ORAL EVERY 6 HOURS PRN
Status: SHIPPED | COMMUNITY
Start: 2024-03-18

## 2024-03-18 RX ORDER — SCOLOPAMINE TRANSDERMAL SYSTEM 1 MG/1
1 PATCH, EXTENDED RELEASE TRANSDERMAL ONCE
Status: DISCONTINUED | OUTPATIENT
Start: 2024-03-18 | End: 2024-03-19

## 2024-03-18 RX ORDER — FAMOTIDINE 20 MG/1
20 TABLET, FILM COATED ORAL 2 TIMES DAILY
Status: DISCONTINUED | OUTPATIENT
Start: 2024-03-18 | End: 2024-03-18

## 2024-03-18 RX ORDER — OXYCODONE HYDROCHLORIDE 5 MG/1
5 TABLET ORAL EVERY 6 HOURS PRN
Status: SHIPPED | COMMUNITY
Start: 2024-03-18

## 2024-03-18 RX ORDER — CEFAZOLIN SODIUM/WATER 2 G/20 ML
2 SYRINGE (ML) INTRAVENOUS ONCE
Status: COMPLETED | OUTPATIENT
Start: 2024-03-18 | End: 2024-03-18

## 2024-03-18 RX ORDER — DULOXETIN HYDROCHLORIDE 20 MG/1
2 CAPSULE, DELAYED RELEASE ORAL DAILY
COMMUNITY

## 2024-03-18 RX ORDER — HYDROCHLOROTHIAZIDE 12.5 MG/1
12.5 TABLET ORAL DAILY
Status: DISCONTINUED | OUTPATIENT
Start: 2024-03-19 | End: 2024-03-19

## 2024-03-18 RX ORDER — TIZANIDINE 2 MG/1
2 TABLET ORAL EVERY 6 HOURS PRN
Status: DISCONTINUED | OUTPATIENT
Start: 2024-03-18 | End: 2024-03-19

## 2024-03-18 RX ORDER — DEXAMETHASONE SODIUM PHOSPHATE 4 MG/ML
VIAL (ML) INJECTION AS NEEDED
Status: DISCONTINUED | OUTPATIENT
Start: 2024-03-18 | End: 2024-03-18 | Stop reason: SURG

## 2024-03-18 RX ORDER — DIPHENHYDRAMINE HYDROCHLORIDE 50 MG/ML
12.5 INJECTION INTRAMUSCULAR; INTRAVENOUS EVERY 4 HOURS PRN
Status: DISCONTINUED | OUTPATIENT
Start: 2024-03-18 | End: 2024-03-19

## 2024-03-18 RX ORDER — HYDROMORPHONE HYDROCHLORIDE 1 MG/ML
INJECTION, SOLUTION INTRAMUSCULAR; INTRAVENOUS; SUBCUTANEOUS
Status: COMPLETED
Start: 2024-03-18 | End: 2024-03-18

## 2024-03-18 RX ORDER — ZOLPIDEM TARTRATE 5 MG/1
5 TABLET ORAL NIGHTLY PRN
Status: DISCONTINUED | OUTPATIENT
Start: 2024-03-18 | End: 2024-03-19

## 2024-03-18 RX ORDER — AMLODIPINE BESYLATE 5 MG/1
5 TABLET ORAL EVERY MORNING
Status: DISCONTINUED | OUTPATIENT
Start: 2024-03-19 | End: 2024-03-19

## 2024-03-18 RX ORDER — OXYCODONE HYDROCHLORIDE 5 MG/1
1 TABLET ORAL EVERY 6 HOURS PRN
Status: ON HOLD | COMMUNITY
Start: 2024-03-12 | End: 2024-03-18

## 2024-03-18 RX ORDER — ONDANSETRON 2 MG/ML
4 INJECTION INTRAMUSCULAR; INTRAVENOUS EVERY 6 HOURS PRN
Status: DISCONTINUED | OUTPATIENT
Start: 2024-03-18 | End: 2024-03-18 | Stop reason: HOSPADM

## 2024-03-18 RX ORDER — ACETAMINOPHEN 500 MG
500 TABLET ORAL 4 TIMES DAILY
Status: SHIPPED | COMMUNITY
Start: 2024-03-18

## 2024-03-18 RX ORDER — HYDROMORPHONE HYDROCHLORIDE 1 MG/ML
0.6 INJECTION, SOLUTION INTRAMUSCULAR; INTRAVENOUS; SUBCUTANEOUS EVERY 5 MIN PRN
Status: DISCONTINUED | OUTPATIENT
Start: 2024-03-18 | End: 2024-03-18 | Stop reason: HOSPADM

## 2024-03-18 RX ORDER — HYDROCODONE BITARTRATE AND ACETAMINOPHEN 10; 325 MG/1; MG/1
1 TABLET ORAL ONCE AS NEEDED
Status: DISCONTINUED | OUTPATIENT
Start: 2024-03-18 | End: 2024-03-18 | Stop reason: HOSPADM

## 2024-03-18 RX ORDER — MIDAZOLAM HYDROCHLORIDE 1 MG/ML
INJECTION INTRAMUSCULAR; INTRAVENOUS AS NEEDED
Status: DISCONTINUED | OUTPATIENT
Start: 2024-03-18 | End: 2024-03-18 | Stop reason: SURG

## 2024-03-18 RX ORDER — OXYCODONE HYDROCHLORIDE 5 MG/1
5 TABLET ORAL EVERY 4 HOURS PRN
Status: DISCONTINUED | OUTPATIENT
Start: 2024-03-18 | End: 2024-03-19

## 2024-03-18 RX ORDER — DIPHENHYDRAMINE HYDROCHLORIDE 50 MG/ML
25 INJECTION INTRAMUSCULAR; INTRAVENOUS ONCE AS NEEDED
Status: ACTIVE | OUTPATIENT
Start: 2024-03-18 | End: 2024-03-18

## 2024-03-18 RX ORDER — ONDANSETRON 2 MG/ML
INJECTION INTRAMUSCULAR; INTRAVENOUS AS NEEDED
Status: DISCONTINUED | OUTPATIENT
Start: 2024-03-18 | End: 2024-03-18 | Stop reason: SURG

## 2024-03-18 RX ORDER — SODIUM CHLORIDE, SODIUM LACTATE, POTASSIUM CHLORIDE, CALCIUM CHLORIDE 600; 310; 30; 20 MG/100ML; MG/100ML; MG/100ML; MG/100ML
INJECTION, SOLUTION INTRAVENOUS CONTINUOUS
Status: DISCONTINUED | OUTPATIENT
Start: 2024-03-18 | End: 2024-03-18 | Stop reason: HOSPADM

## 2024-03-18 RX ORDER — HYDROMORPHONE HYDROCHLORIDE 1 MG/ML
0.6 INJECTION, SOLUTION INTRAMUSCULAR; INTRAVENOUS; SUBCUTANEOUS EVERY 5 MIN PRN
Status: CANCELLED | OUTPATIENT
Start: 2024-03-18 | End: 2024-03-18

## 2024-03-18 RX ORDER — DULOXETIN HYDROCHLORIDE 20 MG/1
40 CAPSULE, DELAYED RELEASE ORAL DAILY
Status: DISCONTINUED | OUTPATIENT
Start: 2024-03-19 | End: 2024-03-19

## 2024-03-18 RX ORDER — SODIUM CHLORIDE, SODIUM LACTATE, POTASSIUM CHLORIDE, CALCIUM CHLORIDE 600; 310; 30; 20 MG/100ML; MG/100ML; MG/100ML; MG/100ML
INJECTION, SOLUTION INTRAVENOUS CONTINUOUS
Status: DISCONTINUED | OUTPATIENT
Start: 2024-03-18 | End: 2024-03-19

## 2024-03-18 RX ORDER — BUPRENORPHINE HYDROCHLORIDE 0.32 MG/ML
INJECTION INTRAMUSCULAR; INTRAVENOUS AS NEEDED
Status: DISCONTINUED | OUTPATIENT
Start: 2024-03-18 | End: 2024-03-18 | Stop reason: SURG

## 2024-03-18 RX ORDER — PROCHLORPERAZINE EDISYLATE 5 MG/ML
5 INJECTION INTRAMUSCULAR; INTRAVENOUS EVERY 8 HOURS PRN
Status: DISCONTINUED | OUTPATIENT
Start: 2024-03-18 | End: 2024-03-19

## 2024-03-18 RX ORDER — TIZANIDINE 2 MG/1
2 TABLET ORAL EVERY 6 HOURS PRN
Status: DISCONTINUED | OUTPATIENT
Start: 2024-03-18 | End: 2024-03-18

## 2024-03-18 RX ORDER — PANTOPRAZOLE SODIUM 20 MG/1
20 TABLET, DELAYED RELEASE ORAL
Status: DISCONTINUED | OUTPATIENT
Start: 2024-03-18 | End: 2024-03-19

## 2024-03-18 RX ORDER — OXYCODONE HYDROCHLORIDE 10 MG/1
10 TABLET ORAL EVERY 4 HOURS PRN
Status: DISCONTINUED | OUTPATIENT
Start: 2024-03-18 | End: 2024-03-19

## 2024-03-18 RX ORDER — NALOXONE HYDROCHLORIDE 0.4 MG/ML
80 INJECTION, SOLUTION INTRAMUSCULAR; INTRAVENOUS; SUBCUTANEOUS AS NEEDED
Status: DISCONTINUED | OUTPATIENT
Start: 2024-03-18 | End: 2024-03-18 | Stop reason: HOSPADM

## 2024-03-18 RX ORDER — SENNOSIDES 8.6 MG
17.2 TABLET ORAL NIGHTLY
Status: DISCONTINUED | OUTPATIENT
Start: 2024-03-18 | End: 2024-03-19

## 2024-03-18 RX ORDER — DOCUSATE SODIUM 100 MG/1
100 CAPSULE, LIQUID FILLED ORAL 2 TIMES DAILY
Status: SHIPPED | COMMUNITY
Start: 2024-03-18

## 2024-03-18 RX ORDER — BISACODYL 10 MG
10 SUPPOSITORY, RECTAL RECTAL
Status: DISCONTINUED | OUTPATIENT
Start: 2024-03-18 | End: 2024-03-19

## 2024-03-18 RX ORDER — KETAMINE HYDROCHLORIDE 50 MG/ML
INJECTION, SOLUTION INTRAMUSCULAR; INTRAVENOUS AS NEEDED
Status: DISCONTINUED | OUTPATIENT
Start: 2024-03-18 | End: 2024-03-18 | Stop reason: SURG

## 2024-03-18 RX ORDER — ACETAMINOPHEN 325 MG/1
TABLET ORAL
Status: COMPLETED
Start: 2024-03-18 | End: 2024-03-18

## 2024-03-18 RX ORDER — DOCUSATE SODIUM 100 MG/1
100 CAPSULE, LIQUID FILLED ORAL 2 TIMES DAILY
Status: DISCONTINUED | OUTPATIENT
Start: 2024-03-18 | End: 2024-03-19

## 2024-03-18 RX ORDER — SCOLOPAMINE TRANSDERMAL SYSTEM 1 MG/1
PATCH, EXTENDED RELEASE TRANSDERMAL
Status: DISCONTINUED
Start: 2024-03-18 | End: 2024-03-19

## 2024-03-18 RX ORDER — SODIUM CHLORIDE, SODIUM LACTATE, POTASSIUM CHLORIDE, CALCIUM CHLORIDE 600; 310; 30; 20 MG/100ML; MG/100ML; MG/100ML; MG/100ML
INJECTION, SOLUTION INTRAVENOUS CONTINUOUS
Status: CANCELLED | OUTPATIENT
Start: 2024-03-18

## 2024-03-18 RX ORDER — CEFAZOLIN SODIUM/WATER 2 G/20 ML
SYRINGE (ML) INTRAVENOUS
Status: DISCONTINUED
Start: 2024-03-18 | End: 2024-03-18

## 2024-03-18 RX ORDER — DEXAMETHASONE SODIUM PHOSPHATE 10 MG/ML
8 INJECTION, SOLUTION INTRAMUSCULAR; INTRAVENOUS ONCE
Status: COMPLETED | OUTPATIENT
Start: 2024-03-19 | End: 2024-03-19

## 2024-03-18 RX ORDER — CELECOXIB 200 MG/1
200 CAPSULE ORAL DAILY
COMMUNITY
Start: 2024-03-12

## 2024-03-18 RX ORDER — ACETAMINOPHEN 325 MG/1
650 TABLET ORAL ONCE
Status: COMPLETED | OUTPATIENT
Start: 2024-03-18 | End: 2024-03-18

## 2024-03-18 RX ORDER — ONDANSETRON 2 MG/ML
4 INJECTION INTRAMUSCULAR; INTRAVENOUS EVERY 6 HOURS PRN
Status: DISCONTINUED | OUTPATIENT
Start: 2024-03-18 | End: 2024-03-19

## 2024-03-18 RX ORDER — POLYETHYLENE GLYCOL 3350 17 G/17G
17 POWDER, FOR SOLUTION ORAL DAILY PRN
Status: DISCONTINUED | OUTPATIENT
Start: 2024-03-18 | End: 2024-03-19

## 2024-03-18 RX ORDER — HYDROMORPHONE HYDROCHLORIDE 1 MG/ML
0.4 INJECTION, SOLUTION INTRAMUSCULAR; INTRAVENOUS; SUBCUTANEOUS EVERY 2 HOUR PRN
Status: DISCONTINUED | OUTPATIENT
Start: 2024-03-18 | End: 2024-03-19

## 2024-03-18 RX ORDER — HYDROMORPHONE HYDROCHLORIDE 1 MG/ML
0.2 INJECTION, SOLUTION INTRAMUSCULAR; INTRAVENOUS; SUBCUTANEOUS EVERY 5 MIN PRN
Status: DISCONTINUED | OUTPATIENT
Start: 2024-03-18 | End: 2024-03-18 | Stop reason: HOSPADM

## 2024-03-18 RX ORDER — HYDROMORPHONE HYDROCHLORIDE 1 MG/ML
0.4 INJECTION, SOLUTION INTRAMUSCULAR; INTRAVENOUS; SUBCUTANEOUS EVERY 5 MIN PRN
Status: CANCELLED | OUTPATIENT
Start: 2024-03-18 | End: 2024-03-18

## 2024-03-18 RX ORDER — HYDROMORPHONE HYDROCHLORIDE 1 MG/ML
0.8 INJECTION, SOLUTION INTRAMUSCULAR; INTRAVENOUS; SUBCUTANEOUS EVERY 2 HOUR PRN
Status: DISCONTINUED | OUTPATIENT
Start: 2024-03-18 | End: 2024-03-19

## 2024-03-18 RX ORDER — DEXAMETHASONE SODIUM PHOSPHATE 10 MG/ML
8 INJECTION, SOLUTION INTRAMUSCULAR; INTRAVENOUS ONCE
Status: DISCONTINUED | OUTPATIENT
Start: 2024-03-19 | End: 2024-03-18

## 2024-03-18 RX ORDER — ENEMA 19; 7 G/133ML; G/133ML
1 ENEMA RECTAL ONCE AS NEEDED
Status: DISCONTINUED | OUTPATIENT
Start: 2024-03-18 | End: 2024-03-19

## 2024-03-18 RX ORDER — LABETALOL HYDROCHLORIDE 5 MG/ML
5 INJECTION, SOLUTION INTRAVENOUS EVERY 5 MIN PRN
Status: DISCONTINUED | OUTPATIENT
Start: 2024-03-18 | End: 2024-03-18 | Stop reason: HOSPADM

## 2024-03-18 RX ORDER — TRANEXAMIC ACID 10 MG/ML
1000 INJECTION, SOLUTION INTRAVENOUS ONCE
Status: COMPLETED | OUTPATIENT
Start: 2024-03-18 | End: 2024-03-18

## 2024-03-18 RX ADMIN — BUPRENORPHINE HYDROCHLORIDE 150 MCG: 0.32 INJECTION INTRAMUSCULAR; INTRAVENOUS at 07:17:00

## 2024-03-18 RX ADMIN — CEFAZOLIN SODIUM/WATER 2 G: 2 G/20 ML SYRINGE (ML) INTRAVENOUS at 07:07:00

## 2024-03-18 RX ADMIN — DEXAMETHASONE SODIUM PHOSPHATE 8 MG: 4 MG/ML VIAL (ML) INJECTION at 07:07:00

## 2024-03-18 RX ADMIN — ONDANSETRON 4 MG: 2 INJECTION INTRAMUSCULAR; INTRAVENOUS at 07:52:00

## 2024-03-18 RX ADMIN — MIDAZOLAM HYDROCHLORIDE 2 MG: 1 INJECTION INTRAMUSCULAR; INTRAVENOUS at 07:11:00

## 2024-03-18 RX ADMIN — MIDAZOLAM HYDROCHLORIDE 2 MG: 1 INJECTION INTRAMUSCULAR; INTRAVENOUS at 07:25:00

## 2024-03-18 RX ADMIN — KETAMINE HYDROCHLORIDE 25 MG: 50 INJECTION, SOLUTION INTRAMUSCULAR; INTRAVENOUS at 07:28:00

## 2024-03-18 RX ADMIN — SODIUM CHLORIDE, SODIUM LACTATE, POTASSIUM CHLORIDE, CALCIUM CHLORIDE: 600; 310; 30; 20 INJECTION, SOLUTION INTRAVENOUS at 08:56:00

## 2024-03-18 RX ADMIN — TRANEXAMIC ACID 1000 MG: 10 INJECTION, SOLUTION INTRAVENOUS at 07:22:00

## 2024-03-18 NOTE — ANESTHESIA POSTPROCEDURE EVALUATION
[facility]    Denisse Galvez Patient Status:  Outpatient in a Bed   Age/Gender 58 year old female MRN [id number]   Location [facility] SURGERY Attending Preston Whitehead MD   Hosp Day # 0 PCP July Valencia MD       Anesthesia Post-op Note    RIGHT TOTAL KNEE REPLACEMENT    Procedure Summary       Date: 03/18/24 Room / Location:  MAIN OR 12 / [facility] MAIN OR    Anesthesia Start: 0707 Anesthesia Stop: 0856    Procedure: RIGHT TOTAL KNEE REPLACEMENT (Right: Knee) Diagnosis: (RIGHT KNEE OSTEOARTHRITIS)    Surgeons: Preston Whitehead MD Anesthesiologist: Rahat Sotomayor MD    Anesthesia Type: spinal ASA Status: 3            Anesthesia Type: spinal    Vitals Value Taken Time   /95 03/18/24 0856   Temp 97.2 °F (36.2 °C) 03/18/24 0856   Pulse 83 03/18/24 0856   Resp 16 03/18/24 0856   SpO2 98 % 03/18/24 0856       Patient Location: PACU    Anesthesia Type: spinal    Airway Patency: patent    Postop Pain Control: adequate    Mental Status: mildly sedated but able to meaningfully participate in the post-anesthesia evaluation    Nausea/Vomiting: none    Cardiopulmonary/Hydration status: stable euvolemic    Postop vital signs: stable    Patient to be discharged from PACU when criteria met.

## 2024-03-18 NOTE — DISCHARGE INSTRUCTIONS
Sometimes managing your health at home requires assistance.  The [facility] team has recognized your preference to use [facility].  They can be reached by phone at [phone].  The fax number for your reference is [phone].  A representative from the home health agency will contact you or your family to schedule your first visit.      Please verify or make your first post operative appointment by calling your surgeon's office.  Appointment should be within next 10-21 days.      Call your surgeon's office if:  You have fever over 101.  Wound looks very red.  You have calf pain.     If you have chest pain or shortness of breath, call 911.     Move slowly and have your caregiver next to you for the first few days as you sit up from supine or stand from sitting.  You may feel lightheaded and need support.  Drink plenty of fluid to keep yourself hydrated.  You may put as much weight on operative leg as you feel comfortable.  You may wean off assistive walking device as you feel comfortable doing so under your therapists supervision.     Wear your compressive sleeve during day time until follow up appointment.  Elevate and ice to control swelling.  Take 10 deep breaths every hour you are awake to keep your lungs expanded.  Perform active ankle pumps 10 times every hour you are awake.  You are encouraged to walk around your first floor living space every 1-2 hours while you are awake.  Start dressing change in one week from date of surgery.   Change dressing daily.        The following medications have been sent to your preferred pharmacy. Please  in preparation for surgery. Do NOT begin taking unless otherwise instructed.      Medications:  Xarelto 10 mg  take one tab daily to prevent blood clot.    Colace 100 mg is a stool softener.  Please take twice daily for constipation. Discontinue when narcotics (tramadol/oxycodone/norco, etc) are no longer required for pain control.  Extra  Strength Tylenol 500mg.  Take 2 tabs (1000mg) 4 times daily.  (maximum 4000mg daily) for 2 weeks.  After 2 weeks, take one tab as needed every 6 hours.  Celebrex 200mg  is a nonsteroidal anti-inflammatory medication. Please take one tab daily for 30 days.  Tramadol 50mg is a pain medication for moderate pain.  Take every 6 hours as needed.  Oxycodone 5mg is a pain medication for severe pain. Take every 6 hours as needed.  Do not take Tramadol and Oxydone at the same time.  Space out at least one hour.     Spanda- knee replacement (single layer compression sleeve):  All patients should wear the compression sleeves (SPANDA-) until first follow up visit to surgeon’s office ( about 2-3 weeks) and then check with surgeon if need to continue use.  Take off to shower. Best to keep on as much as possible, even at night.  Wash with mild soap and water; DRIP dry overnight.    Directions to put on and off:  IT TAKES 2 PIECES OF SPANDA- (compression sleeves), (USUALLY DIFFERENT SIZES because a calf is usually smaller than a knee.)   Use the longer tube (spanda-/compression sleeve) pulled up over the calf.   This 1st piece (spanda-/compression sleeve) should be on the calf part of the leg, from just below the knee to the ball of the foot to expose the toes.   The 2nd piece (shorter compression sleeve) is pulled over and past the first sleeve onto the knee. The lower edge of the knee portion should overlap the top of the calf spanda- by a few inches. This is the only place where the 2 different pieces overlap.  The top edge of the second spanda- should be about a few inches above the knee but it should NOT be rolling down. The spanda- should be flat so that it does not roll and become too tight.  Makes sure it is NOT bunched up anywhere.   IF the spanda- feels TOO tight, then REMOVE it and call your surgeon to let them know.

## 2024-03-18 NOTE — PHYSICAL THERAPY NOTE
PHYSICAL THERAPY JOINT EVALUATION - INPATIENT     Room Number: 361/361-A  Evaluation Date: 3/18/2024  Type of Evaluation: Initial  Physician Order: PT Eval and Treat    Presenting Problem: s/p R TKA 3/18  Co-Morbidities : h/o L TKA  Reason for Therapy: Mobility Dysfunction and Discharge Planning    PHYSICAL THERAPY ASSESSMENT   Patient is currently functioning near baseline with bed mobility, transfers, gait, and stair negotiation. Prior to admission, patient's baseline is independent.   Patient is requiring contact guard assist as a result of the following impairments: decreased functional strength and limited R knee ROM.  Physical Therapy will continue to follow for duration of hospitalization.    Patient will benefit from continued skilled PT Services at discharge to promote functional independence and safety with additional support and return home with home health PT.    PLAN  PT Treatment Plan: Bed mobility;Body mechanics;Endurance;Energy conservation;Patient education;Family education;Gait training;Range of motion;Strengthening;Stair training;Transfer training;Balance training  Rehab Potential : Good  Frequency (Obs):  (2-3x/week)  Number of Visits to Meet Established Goals: 3      Goal #1  Patient is able to demonstrate supine - sit EOB @ level: supervision     Goal #2  Patient is able to demonstrate transfers Sit to/from Stand at assistance level: supervison       Goal #3    Patient is able to ambulate 150 feet with assistive device at assistance level: supervision   Goal #4    Patient will negotiate 4 stairs/one curb w/ assistive device and supervision   Goal #5    Patient verbalizes and/or demonstrates all precautions and safety concerns independently    Goal #6      Goal Comments: Goals established on 3/18/2024      PHYSICAL THERAPY MEDICAL/SOCIAL HISTORY  History related to current admission: Patient is a 58 year old female admitted on 3/18/2024 from home for R TKA.      HOME SITUATION  Type of  Home: House   Home Layout: One level  Stairs to Enter : 3             Lives With:  (will be staying at friend's house)     Patient Owned Equipment: Rolling walker       Prior Level of [place]: Pt reports ind PTA.  Pt states prolonged recovery s/p L TKA in 2020, reports h/o mastectomy, tummy tuck, and cervical surgery.  Pt plans to stay with friend after dc in one level house with 3 steps to enter.      SUBJECTIVE  \"I think the neck surgery was the hardest.\"  Re: recovery.      OBJECTIVE  Precautions: Bed/chair alarm  Fall Risk: High fall risk    WEIGHT BEARING RESTRICTION                   PAIN ASSESSMENT  Rating: Unable to rate          COGNITION  Overall Cognitive Status:  WFL - within functional limits    RANGE OF MOTION AND STRENGTH ASSESSMENT  Upper extremity ROM and strength are within functional limits     Lower extremity ROM is within functional limits except R knee consistent with sx this date.      Lower extremity strength is within functional limits except R knee consistent with sx this date.      BALANCE  Static Sitting: Good  Dynamic Sitting: Good  Static Standing: Fair -  Dynamic Standing: Fair -    ADDITIONAL TESTS                                    ACTIVITY TOLERANCE                         O2 WALK       NEUROLOGICAL FINDINGS                        AM-PAC '6-Clicks' INPATIENT SHORT FORM - BASIC MOBILITY  How much difficulty does the patient currently have...  Patient Difficulty: Turning over in bed (including adjusting bedclothes, sheets and blankets)?: A Little   Patient Difficulty: Sitting down on and standing up from a chair with arms (e.g., wheelchair, bedside commode, etc.): A Little   Patient Difficulty: Moving from lying on back to sitting on the side of the bed?: A Little   How much help from another person does the patient currently need...   Help from Another: Moving to and from a bed to a chair (including a wheelchair)?: A Little   Help from Another: Need to walk in hospital room?: A  Little   Help from Another: Climbing 3-5 steps with a railing?: A Little       AM-PAC Score:  Raw Score: 18   Approx Degree of Impairment: 46.58%   Standardized Score (AM-PAC Scale): 43.63   CMS Modifier (G-Code): CK    FUNCTIONAL ABILITY STATUS  Gait Assessment   Functional Mobility/Gait Assessment  Gait Assistance: Contact guard assist  Distance (ft): 2ftx2  Assistive Device: Rolling walker  Pattern: R Decreased stance time    Skilled Therapy Provided    Bed Mobility:  Rolling: supervision  Supine to sit: supervision   Sit to supine: NT     Transfer Mobility:  Sit to stand: supervision   Stand to sit: supervsion  Gait = CGA as above.      Therapist's Comments: Pt reports feeling of heaviness in RLE- demos full motor control however reports decreased sensation.  Bed to commode and commode to chair transfers only.  No buckling present.  D/w RN and PCT.    Exercise/Education Provided:  Bed mobility  Body mechanics  Functional activity tolerated  Gait training  Transfer training    Patient End of Session: Up in chair;Needs met;Call light within reach;RN aware of session/findings;All patient questions and concerns addressed;Ice applied;Alarm set    Patient Evaluation Complexity Level:  History Moderate - 1 or 2 personal factors and/or co-morbidities   Examination of body systems Moderate - addressing a total of 3 or more elements   Clinical Presentation Moderate - Evolving   Clinical Decision Making Moderate Complexity       PT Session Time: 25 minutes    Therapeutic Activity: 15 minutes

## 2024-03-18 NOTE — CM/SW NOTE
03/18/24 1500   CM/SW Referral Data   Referral Source Social Work (self-referral)   Reason for Referral Discharge planning   Informant EMR;Clinical Staff Member;Patient   Patient Info   Patient's Current Mental Status at Time of Assessment Alert;Oriented   Discharge Needs   Anticipated D/C needs Home health care       Patient is a 59 y/o woman admitted s/p TKR.  Pt with pre-operative plan for [facility].  PT eval pending.  Referral sent to [facility] via [facility] and confirmation received that pt can be accepted.  Met with pt and provided [facility] information for [facility].  Pt stated she will be staying with a friend at [facility] in [place], [place].  She is considering whether she would prefer to go directly to outpatient therapy at [facility], but does not have any appointments made.  Encouraged pt to call to make appointments and if unable to start right away she can have HH through [facility].  Confirmed with [facility] that they are planning to see pt at [facility].  Await PT recommendations for further DC planning.  / to remain available for support and/or discharge planning.     Lizette Vazquez [facility]  Discharge Planner  [phone]

## 2024-03-18 NOTE — PLAN OF CARE
NURSING ADMISSION NOTE      Patient admitted via  bed from PACU.  Oriented to room.  Safety precautions initiated.  Bed in low position.  Call light in reach.    Pt A&Ox4. VSS on 2L O2 NC, hx LUCIA with no CPAP. IV fluids infusing as ordered. Denies numbness or tingling. Weak dorsiflexion and plantar flexion to RLE. Hospitalist consulted. PT/OT evals pending. Resting comfortably in bed at this time. Call light within reach. Will continue to monitor.

## 2024-03-18 NOTE — ANESTHESIA PROCEDURE NOTES
Regional Block    Date/Time: 3/18/2024 7:20 AM    Performed by: Rahat Sotomayor MD  Authorized by: Rahat Sotomayor MD      General Information and Staff    Start Time:  3/18/2024 7:20 AM  End Time:  3/18/2024 7:23 AM  Anesthesiologist:  Rahat Sotomayor MD  Performed by:  Anesthesiologist  Patient Location:  OR      Site Identification: real time ultrasound guided and image stored and retrievable    Block site/laterality marked before start: site marked  Reason for Block: at surgeon's request and post-op pain management    Preanesthetic Checklist: 2 patient identifers, IV checked, risks and benefits discussed, monitors and equipment checked, pre-op evaluation, timeout performed, anesthesia consent, sterile technique used, no prohibitive neurological deficits and no local skin infection at insertion site      Procedure Details    Patient Position:  Supine  Prep: ChloraPrep    Monitoring:  Cardiac monitor, continuous pulse ox and blood pressure cuff  Block Type:  Adductor canal  Laterality:  Right  Injection Technique:  Single-shot    Needle    Needle Type:  Short-bevel and echogenic  Needle Gauge:  21 G  Needle Length:  110 mm  Needle Localization:  Ultrasound guidance  Reason for Ultrasound Use: appropriate spread of the medication was noted in real time and no ultrasound evidence of intravascular and/or intraneural injection            Assessment    Injection Assessment:  Good spread noted, negative resistance, negative aspiration for heme, incremental injection and low pressure  Heart Rate Change: No    - Patient tolerated block procedure well without evidence of immediate block related complications.     Medications  3/18/2024 7:20 AM      Additional Comments    Medication:  Bupivacaine 0.375% 20mL with 1:200,000 epi and 2 mg decadron and 0.15 mg buprenorphine.

## 2024-03-18 NOTE — PROGRESS NOTES
Orthopedic surgery progress note    Denisse Galvez Patient Status:  Outpatient in a Bed    10/18/1965 MRN [id number]   [facility] 3SW-A Attending Preston Whitehead MD   Hosp Day # 0 PCP July Valencia MD       Subjective:  No major complaints.  No calf pain, CP, SOB, lightheadedness, nausea.      Physical Exam:  Temp:  [97.2 °F (36.2 °C)-98.2 °F (36.8 °C)] 97.6 °F (36.4 °C)  Pulse:  [62-86] 84  Resp:  [10-23] 18  BP: ()/(49-95) 112/70  SpO2:  [92 %-100 %] 97 %    In no acute distress  Knee dressing is intact.  No signs of infection  Thigh and leg are soft.  Both calves are NT.  No signs of DVT.  NVI.    Data review:  Post op x-ray reviewed.    S/p right TKR        Significance of achieving good ROM in a timely fashion explained.  PT/OT  DVT prophylaxis with  xarelto  Anticipate DC to home tomorrow.      Preston Whitehead MD  [facility]  3/18/2024  4:22 PM

## 2024-03-18 NOTE — OPERATIVE REPORT
[facility]  TOTAL KNEE OPERATIVE REPORT:  DATE OF SURGERY: 3/18/2024    Denisse Galvez       [id number]     10/18/1965    PREOP DX: RIGHTKNEE PRIMARY OSTEOARTHRITIS    POSTOP DX:RIGHT KNEE PRIMARY OSTEOARTHRITIS  PROCEDURE: RIGHT TOTAL KNEE REPLACEMENT  SURGEON: BRIDGETTE BROWN MD  FIRST ASSIST: SHER MAGALLANES CSA  FIRST ASSISTANT WAS NECESSARY FOR PATIENT POSITIONING, EXPOSURE/WOUND RETRACTION, JOINT DISLOCATION/REDUCTION, PROSTHESIS IMPLANTATION, WOUND CLOSURE.  ANESTHESIA: SPINAL AND ADDUCTOR CANAL BLOCK  EBL: 50 ml  SPECIMEN: DISTAL FEMORAL AND PROXIMAL TIBIA CUT BONE  COMPLICATIONS: NONE  DRAIN: NONE  IMPLANT USED:DEPUY ATTUNE CR FEMORAL SIZE  5N ,  TIBIA FB SIZE 5, POLY  CR SIZE 8,  35 ANATOMIC PATELLA  PROCEDURE NOTE:  PATIENT WAS CORRECTLY IDENTIFIED AND OPERATIVE SITE WAS VERIFIED IN THE PREOPERATIVE HOLDING AREA.  PATIENT WAS BROUGHT TO THE OR AND WAS PLACED IN SUPINE POSITION.  PREOPERATIVE ANTIBIOTIC AND TXA WERE GIVEN.   ANESTHESIA WAS ADMINISTERED.  ARMS WERE POSITIONED BT ANESTHESIA.  NON-OPERATIVE LEG HAD SCD APPLIED AND CUSHIONED.  OPERATIVE LEG WAS PREPPED AND DRAPED IN SURGICALLY STERILE AND STANDARD FASHION.   BLOOD WAS EXSANGUINATED.  TOURNIQUET WAS INFLATED.  ANTERIOR LINEAR INCISION WAS MADE.  MEDIAL ARTHROTOMY WAS PERFORMED.  MEDIAL RELEASE WAS DONE TO A LIMITED DEGREE.  RETROPATELLAR FAT AND ANTERIOR FEMORAL FAT WERE REMOVED IN LIMITED FASHION.  PATELLA WAS SUBLUXED LATERALLY.  KNEE WAS FLEXED.  OSTEOARTHRITIS WAS IDENTIFIED.  OSTEOPHYTES WERE REMOVED.  ACL/PCL WERE REMOVED.  PROXIMAL TIBIA WAS SUBLUXED ANTERIORLY.  MEDIAL AND LATERAL MENISCI WERE REMOVED.  PROXIMAL TIBIA CUT WAS MADE USING EXTRAMEDULLARY GUIDE WITH 3 DEGREE  OF POSTERIOR SLOPE.  CUT WAS FOUND TO BE SATISFACTORY.  NEXT, DISTAL FEMORAL CUT WAS MADE WITH INTRAMEDULLARY GUIDE WITH 7 DEGREE VALGUS CUT.  REMAINING OSTEOPHYTES WERE REMOVED.  EXTENSION GAP WAS CHECKED USING A  SPACER.  I CAREFULLY RELEASED ITB OFF TIBIA.  KNEE WAS  FLEXED.  FEMUR WAS SIZED AT 5.  FEMORAL ROTATION WAS SET USING  TENSION GUIDED TECHNIQUE.  ANTERIOR AND POSTERIOR FEMORAL CUT WAS MADE.  FLEXION AND EXTENSION GAPS WERE CHECKED USING RECTAGULAR SPACER BLOCK.  GAPS WERE FOUND TO BE EQUAL.  VALGUS/VARUS STRESS TEST WAS STABLE.   FEMUR WAS FINISHED OFF WITH CHAMFER AND NOTCH CUTS IN USUAL FASHION.  POSTERIOR FEMORAL OSTEOPHYTES WERE REMOVED.  POSTERIOR CAPSULAR RELEASE WAS DONE CAREFULLY.    PROXIMAL TIBIA WAS EXPOSED.  TIBIA WAS SIZED at 5 AND ROTATION WAS SET USING MEDIAL 1/3 OF TIBIA TUBERCLE.  TIBIA WAS PREPARED IN STANDARD FASHION.  TRIAL COMPONENTS WERE PLACED.  PATELLA WAS EVERTED AND THICKNESS MEASURED at 23.  PATELLAR CUT WAS MADE FREE HANDED FASHION. 15 mm THICKNESS PATELLAR BONE REMAINED.  KNEE WAS EXAMINED.  KNEE WAS RANGED.  GOOD FULL EXTENSION WAS ESTABLISHED AND FLEXION BEYOND BEYOND 120 DEG  KNEE WAS OBTAINED.  KNEE WAS STABLE TO VALGUS AND VARUS STRESS.  PATELLA TRACKED NICELY.  ALL THE TRIAL IMPLANTS WERE REMOVED.  WOUND WAS IRRIGATED.    LOCAL COCKTAIL WAS INJECTED CAREFULLY TO POSTERIOR CAPSULE/GUTTERS/FAT PADS.   CEMENT WAS MIXED.  FIRST TIBIA COMPONENT, THEN FEMORAL COMPONENT WERE APPLIED.  EXCESS CEMENT WAS REMOVED. REAL POLY WAS INSERTED. KNEE WAS EXTENDED.  PATELLAR COMPONENT WAS APPLIED.  TOURNIQUET WAS DEFLATED.  KNEE WAS HELD IN EXTENSION UNTIL CEMENT WAS SET.  HEMOSTASIS WAS OBTAINED.  KNEE WAS RANGED AGAIN WITH GOOD MOTION AND STABILITY.  ARTHROTOMY WAS CLOSED. SUBCUTANEOUS LAYER WAS CLOSED IN MULTIPLE LAYERS.  SKIN WAS CLOSED.  STERILE DRESSING WAS APPLIED.    Preston Whitehead MD  3/18/2024

## 2024-03-18 NOTE — CONSULTS
[facility] Hospitalist Initial Consult       Reason for consult: Medical Management sp TKA      History of Present Illness: Patient is a 58 year old female with PMH sig for prior DVT, fatty liver, OSAm THN who presents sp TKA.   They tolerated the procedure well without any immediate complications.  Specifically, they deny N/V, lightheadness, chest pain, SOB, cough or fever.  Their pain is currently controlled on current regimen.      Prior to Admission Medications   Medication Sig    oxyCODONE 5 MG Oral Tab Take 1 tablet (5 mg total) by mouth every 6 (six) hours as needed for Pain (severe).    [START ON 3/20/2024] rivaroxaban 10 MG Oral Tab Take 1 tablet (10 mg total) by mouth.    traMADol 50 MG Oral Tab Take 1 tablet (50 mg total) by mouth every 6 (six) hours as needed for Pain (moderate).    acetaminophen 500 MG Oral Tab Take 1 tablet (500 mg total) by mouth 4 (four) times daily. Complete 2 week course    docusate sodium 100 MG Oral Cap Take 1 capsule (100 mg total) by mouth 2 (two) times daily.         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    BRCA gene positive 10/15/2018    Carrier of gene mutation for high risk of cancer 08/13/2018    Deep vein thrombosis (HCC)     Left femur 8/2020    Depression     Difficult intubation     Disorder of liver     Fatty Liver    Esophageal reflux     Essential hypertension     Family history of malignant hyperthermia     Fibromyalgia     Fibromyalgia     Gastrointestinal hemorrhage, unspecified  gastrointestinal hemorrhage type 08/28/2020    High blood pressure     Malignant hyperthermia     SLOW TO AWAKEN    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     Migraines     Neuropathy     Obstructive apnea 04/14/2021    DMG PSG AHI 28    Osteoarthritis     PONV (postoperative nausea and vomiting)     Postoperative pain 11/17/2020    Sleep apnea     Use  cpap at times    Stress fracture of lower leg, initial encounter 12/21/2020    Urge incontinence of urine     Urine incontinence     Visual impairment     readers      Past Surgical History:   Procedure Laterality Date    ARTHROSCOPY OF JOINT UNLISTED Right     KNEE    ARTHROSCOPY, ANKLE, SURGICAL; W/ANKLE ARTHRODESIS      BREAST RECONSTRUCTION Bilateral 10/15/2018    Breast Reconstruction with Bilateral Tissue Expander Placement    CHOLECYSTECTOMY      ENDOMETRIAL ABLATION      FRACTURE SURGERY Left     ELBOW    HYSTERECTOMY  2009    laparscopic /ovaries intact     LAPAROSCOPY,DIAGNOSTIC      MASTECTOMY LEFT      MASTECTOMY RIGHT      OTHER Right     KNEE ARTHROSCOPY    OTHER SURGICAL HISTORY  10/2018    bilateral simple mastectomies - Dr. Catie Vargas    SPINE SURGERY PROCEDURE UNLISTED      C3-T1 FUSIONS    TOTAL KNEE REPLACEMENT Left 08/03/2020      Social History     Tobacco Use    Smoking status: Never    Smokeless tobacco: Never   Substance Use Topics    Alcohol use: Not Currently     Comment: very rare        Fam Hx  Family History   Problem Relation Age of Onset    Heart Disease Father     Pulmonary Disease Father     Hypertension Father     Heart Attack Father 57    Cancer Father         Lung cancer    Heart Disease Mother     Hypertension Mother     Cancer Mother         Uterine     Heart Attack Mother 55    Other (Other) Mother         Pulmonary Embolism x 3    Hypertension Sister     Cancer Sister         breast     Seizure Disorder Sister     Other (Other) Sister         radial scars    Heart Disease Brother     Hypertension Brother     Seizure Disorder  Brother     Heart Attack Brother 49    DVT/VTE Other         Arm    Ovarian Cancer Neg        Review of Systems  All 10 systems otherwise reviewed and negative unless otherwise stated in the HPI.        OBJECTIVE:  /70   Pulse 84   Temp 97.6 °F (36.4 °C) (Oral)   Resp 18   Ht 5' 2\" (1.575 m)   Wt 203 lb (92.1 kg)   SpO2 97%   BMI 37.13 kg/m²   General:  Alert, no distress, appears stated age.   Head:  Normocephalic, without obvious abnormality, atraumatic.   Eyes:  Sclera anicteric, No conjunctival pallor, EOMs intact.    Nose: Nares normal. Septum midline. Mucosa normal. No drainage.   Throat: Lips, mucosa, and tongue normal. Teeth and gums normal.   Neck: Supple, symmetrical, trachea midline, no cervical or supraclavicular lymph adenopathy, thyroid: no enlargment/tenderness/nodules appreciated   Lungs:   Clear to auscultation bilaterally. Normal effort   Chest wall:  No tenderness or deformity.   Heart:  Regular rate and rhythm, S1, S2 normal, no murmur, rub or gallop appreciated   Abdomen:   Soft, non-tender. Bowel sounds normal. No masses,  No organomegaly. Non distended   Extremities: Dressing c/d/i.     Skin: Skin color, texture, turgor normal. No rashes or lesions.    Neurologic: Normal strength, no focal deficit appreciated     Laboratory:    ASSESSMENT / PLAN:    sp TKA  - Plan per ortho.  Continue PT/OT.  Pain is currently controlled.  Xarelto for DVT prophylaxis.     Ho dvt  - xarelto post op, saw heme    Fibromyalgia  Complex regional pain syndrome  - cont home meds    Acute on chronic pain  - cont IV narctotics as needed and monitor for signs of complication    HTN  - cont hydrochlorothiazide, norvasc    GERD  - ppi          Prevention: Xarelto, SCDs, bowel regimen      Outpatient records and previous hospital records reviewed.     Thank you for allowing me to participate in the care of this patient.     Gabe Michel MD  [facility] [place] Hospitalist  Pager [phone]

## 2024-03-18 NOTE — ANESTHESIA PROCEDURE NOTES
Spinal Block    Date/Time: 3/18/2024 7:14 AM    Performed by: Rahat Sotomayor MD  Authorized by: Rahat Sotomayor MD      General Information and Staff    Start Time:  3/18/2024 7:14 AM  End Time:  3/18/2024 7:18 AM  Anesthesiologist:  Rahat Sotomayor MD  Performed by:  Anesthesiologist  Site identification: surface landmarks    Preanesthetic Checklist: patient identified, IV checked, risks and benefits discussed, monitors and equipment checked, pre-op evaluation, timeout performed, anesthesia consent and sterile technique used      Procedure Details    Patient Position:  Sitting  Prep: ChloraPrep    Monitoring:  Cardiac monitor, heart rate and continuous pulse ox  Approach:  Midline  Location:  L3-4  Injection Technique:  Single-shot    Needle    Needle Type:  Sprotte  Needle Gauge:  24 G  Needle Length:  3.5 in    Assessment    Sensory Level:   Events: clear CSF, CSF aspirated, well tolerated and blood negative      Additional Comments

## 2024-03-19 VITALS
HEART RATE: 91 BPM | TEMPERATURE: 98 F | WEIGHT: 203 LBS | RESPIRATION RATE: 17 BRPM | BODY MASS INDEX: 37.36 KG/M2 | HEIGHT: 62 IN | DIASTOLIC BLOOD PRESSURE: 69 MMHG | OXYGEN SATURATION: 87 % | SYSTOLIC BLOOD PRESSURE: 128 MMHG

## 2024-03-19 PROCEDURE — 97535 SELF CARE MNGMENT TRAINING: CPT

## 2024-03-19 PROCEDURE — 97530 THERAPEUTIC ACTIVITIES: CPT

## 2024-03-19 PROCEDURE — 96375 TX/PRO/DX INJ NEW DRUG ADDON: CPT

## 2024-03-19 PROCEDURE — 96376 TX/PRO/DX INJ SAME DRUG ADON: CPT

## 2024-03-19 PROCEDURE — 97116 GAIT TRAINING THERAPY: CPT

## 2024-03-19 PROCEDURE — 97165 OT EVAL LOW COMPLEX 30 MIN: CPT

## 2024-03-19 PROCEDURE — 97110 THERAPEUTIC EXERCISES: CPT

## 2024-03-19 NOTE — PROGRESS NOTES
[facility]  Hospitalist Progress Note                                                                   [facility]    Denisse AHUJA Galvez  10/18/1965    SUBJECTIVE:  Pts pain mostly controlled.  Denies sig sob or chest pain.  No calf pain.  No fevers.      OBJECTIVE:  Temp:  [97.5 °F (36.4 °C)-98.2 °F (36.8 °C)] 97.8 °F (36.6 °C)  Pulse:  [68-91] 91  Resp:  [14-20] 17  BP: ()/(52-95) 128/69  SpO2:  [87 %-99 %] 87 %  Exam  Gen: No acute distress, alert and oriented x3, no focal neurologic deficits  Pulm: Lungs clear bilaterally, normal respiratory effort  CV: Heart with regular rate and rhythm, no murmur.  Normal PMI.    Abd: Abdomen soft, nontender, nondistended, no organomegaly, bowel sounds present  MSK: Full range of motion in extremities, no clubbing, no cyanosis  Skin: no rashes or lesions    Labs:   No results for input(s): \"WBC\", \"HGB\", \"MCV\", \"PLT\", \"BAND\", \"INR\" in the last 168 hours.    Invalid input(s): \"LYM#\", \"MONO#\", \"BASOS#\", \"EOSIN#\"    No results for input(s): \"NA\", \"K\", \"CL\", \"CO2\", \"BUN\", \"CREATSERUM\", \"CA\", \"CAION\", \"MG\", \"PHOS\", \"GLU\" in the last 168 hours.    No results for input(s): \"ALT\", \"AST\", \"ALB\", \"AMYLASE\", \"LIPASE\", \"LDH\" in the last 168 hours.    Invalid input(s): \"ALPHOS\", \"TBIL\", \"DBIL\", \"TPROT\"    No results for input(s): \"PGLU\" in the last 168 hours.    Meds:   Scheduled:    scopolamine  1 patch Transdermal Once    sennosides  17.2 mg Oral Nightly    docusate sodium  100 mg Oral BID    rivaroxaban  10 mg Oral Q24H    traMADol  50 mg Oral 4 times per day    amLODIPine  5 mg Oral QAM    DULoxetine  40 mg Oral Daily    gabapentin  600 mg Oral TID    hydroCHLOROthiazide  12.5 mg Oral Daily    Nortriptyline HCl  75 mg Oral Nightly    pantoprazole  20 mg Oral Daily before evening meal     Continuous Infusions:    lactated ringers Stopped (03/18/24 2034)    lactated ringers Stopped (03/18/24 1700)     PRN: sodium chloride,  polyethylene glycol (PEG 3350), magnesium hydroxide, bisacodyl, fleet enema, ondansetron, prochlorperazine, diphenhydrAMINE **OR** diphenhydrAMINE, zolpidem, tiZANidine, oxyCODONE **OR** oxyCODONE, HYDROmorphone **OR** HYDROmorphone    Assessment/Plan:  Active Problems:    * No active hospital problems. *        sp TKA  - Plan per ortho.  Continue PT/OT.  Pain is currently controlled.  Xarelto for DVT prophylaxis.      Ho dvt  - xarelto post op 30d,  saw heme prior to procedure     Fibromyalgia  Complex regional pain syndrome  - cont home meds     Acute on chronic pain  - controlled with oral meds      HTN  - cont hydrochlorothiazide, norvasc    GERD  - ppi              Prevention: Xarelto, SCDs, bowel regimen       Ok to dc from my end      Gabe ZARAGOZA Hospitalist  Pager: [phone]

## 2024-03-19 NOTE — PROGRESS NOTES
AVS reviewed, IV dc'd , was going to dc w/ [facility] but states has concerns w/ having 4 dogs at home -has family members at home ,-suggested to have them placed outside when HH RN/PT comes for visits or set up outpt PT ..opted to call & set up for outpt PT nearby .

## 2024-03-19 NOTE — PLAN OF CARE
Patient A&O X4 on RA- hx LUCIA no CPAP. VSS, /IS/telemetry- NSR. SCDs, Xarelto. Voiding freely via BSC, LB 3/17. Surgical incision to right knee covered with aquacel/spandigrip, c/d/i. Denies n/t. Pain controlled with PO medication. Gel ice as needed. WBAT. PT/OT. Up min assist w/ walker. Reminded to use call light. Plan to dc home when cleared.

## 2024-03-19 NOTE — PLAN OF CARE
Pt POD #1 right TKA. A&Ox4, VSS. , RA - hx LUCIA no CPAP. Tele - NSR, SCD's, Xarelto. Awaiting PT/OT eval and hospitalist for discharge planning   Plan: DC home with [facility]

## 2024-03-19 NOTE — PHYSICAL THERAPY NOTE
PHYSICAL THERAPY TREATMENT NOTE - INPATIENT    Room Number: 361/361-A     Session: 1     Number of Visits to Meet Established Goals: 3    Presenting Problem: s/p R TKA 3/18  Co-Morbidities : h/o L TKA    ASSESSMENT   Patient demonstrates fair progress this session, goals  remain in progress.    Patient continues to function near baseline with bed mobility, transfers, gait, and stair negotiation.  Contributing factors to remaining limitations include pain.  Next session anticipate patient to progress gait.  Physical Therapy will continue to follow patient for duration of hospitalization.    Patient continues to benefit from continued skilled PT services: at discharge to promote functional independence and safety with additional support and return home with home health PT.    PLAN  PT Treatment Plan: Bed mobility;Body mechanics;Endurance;Energy conservation;Patient education;Family education;Gait training;Range of motion;Strengthening;Stair training;Transfer training;Balance training  Rehab Potential : Good  Frequency (Obs):  (2-3x/week)    CURRENT GOALS         3/19/2024 all goals ongoing     SUBJECTIVE  \"Yeah lots of doctors\"    OBJECTIVE  Precautions: Bed/chair alarm    WEIGHT BEARING RESTRICTION                   PAIN ASSESSMENT   Ratin  Location: surgical knee       BALANCE                                                                                                                       Static Sitting: Good  Dynamic Sitting: Good           Static Standing: Fair -  Dynamic Standing: Fair -    ACTIVITY TOLERANCE                         O2 WALK         AM-PAC '6-Clicks' INPATIENT SHORT FORM - BASIC MOBILITY  How much difficulty does the patient currently have...  Patient Difficulty: Turning over in bed (including adjusting bedclothes, sheets and blankets)?: A Little   Patient Difficulty: Sitting down on and standing up from a chair with arms (e.g., wheelchair, bedside commode, etc.): A Little   Patient  Difficulty: Moving from lying on back to sitting on the side of the bed?: A Little   How much help from another person does the patient currently need...   Help from Another: Moving to and from a bed to a chair (including a wheelchair)?: A Little   Help from Another: Need to walk in hospital room?: A Little   Help from Another: Climbing 3-5 steps with a railing?: A Little       AM-PAC Score:  Raw Score: 18   Approx Degree of Impairment: 46.58%   Standardized Score (AM-PAC Scale): 43.63   CMS Modifier (G-Code): CK    FUNCTIONAL ABILITY STATUS  Gait Assessment   Functional Mobility/Gait Assessment  Gait Assistance: Supervision  Distance (ft): 150  Assistive Device: Rolling walker  Pattern: R Decreased stance time (prefers to do TTWB - encouraged for heel strike/foot flat while in static standing.  Pt is able to complete, but required encouragement)  Stairs: Stairs  How Many Stairs: 4  Device: 2 Rails  Assist: Supervision  Pattern: Ascend and Descend  Ascend and Descend : Step to    Skilled Therapy Provided    Bed Mobility:  Rolling: NT   Supine<>Sit: SBA   Sit<>Supine: NT     Transfer Mobility:  Sit<>Stand: SBA   Stand<>Sit: SBA   Gait: SBA with RW - cues for heel strike.  Pt completely inconsistently     Therapist's Comments:     writer returned later in AM for therex - Pt is unsure of HH versus OP PT d/t 4 dogs at her friends place of residence      THERAPEUTIC EXERCISES  Lower Extremity Ankle pumps  Glut sets  Heel slides  Leg slides  Quad sets     Upper Extremity none     Position Sitting     Repetitions   10   Sets   1     Patient End of Session: Up in chair;Needs met;Call light within reach;RN aware of session/findings;All patient questions and concerns addressed;Alarm set;Ice applied;SCDs in place    PT Session Time: 45 minutes  Gait Training: 15 minutes  Therapeutic Activity: 15 minutes  Therapeutic Exercise: 8 minutes   Neuromuscular Re-education: 0 minutes

## 2024-03-19 NOTE — OCCUPATIONAL THERAPY NOTE
OCCUPATIONAL THERAPY EVALUATION - INPATIENT    Room Number: 361/361-A  Evaluation Date: 3/19/2024     Type of Evaluation: Initial  Presenting Problem: s/p R TKA 3/18/2024    Physician Order: IP Consult to Occupational Therapy  Reason for Therapy:  ADL/IADL Dysfunction and Discharge Planning      OCCUPATIONAL THERAPY ASSESSMENT   Patient is a 58 year old female admitted on 3/18/2024 with Presenting Problem: s/p R TKA 3/18/2024. Co-Morbidities : Fibromyaglia, Hypertension, Complex Regional Pain Syndrome Type 1 of L LE, ILD, DVT, Bilteral Mastectomy, Anxiety, Cervicalgia, Age-related osteoporosis, Transaminitis, hx of L TKA  Patient is currently functioning near baseline with toileting, lower body dressing, bed mobility, and transfers.  Prior to admission, patient's baseline is Independent.  Patient met all OT goals at Modified Independent to Supervision level.  Patient reports no further questions/concerns at this time.     No skilled OT Services are needed at this time.       WEIGHT BEARING RESTRICTION  Weight Bearing Restriction: R lower extremity        R Lower Extremity: Weight Bearing as Tolerated       Recommendations for nursing staff:   Transfers: 1 person  Toileting location: Toilet    EVALUATION SESSION:  Patient at start of session: sitting  FUNCTIONAL TRANSFER ASSESSMENT  Sit to Stand: Edge of Bed; Chair  Edge of Bed: Supervision  Chair: Supervision  Toilet Transfer: Supervision (Standard Height Toliet; use of grabbar required; Patient reported that the friend she will be staying with has a Standard Height Toliet with a counter adjacent)    BED MOBILITY  Supine to Sit : Modified Independent  Sit to Supine (OT): Modified Independent    BALANCE ASSESSMENT     FUNCTIONAL ADL ASSESSMENT  LB Dressing Seated: Supervision (Simulated LB Dressing as friend took her clothes home, sufficient reach to BLE without AE)  Toileting Seated: Supervision  Toileting Standing: Supervision      ACTIVITY TOLERANCE: WFL                          O2 SATURATIONS       COGNITION  Overall Cognitive Status:  WFL - within functional limits  COGNITION ASSESSMENTS       Upper Extremity:   ROM: within functional limits   Strength: is within functional limits       EDUCATION PROVIDED  Patient: Role of Occupational Therapy; Discharge Recommendations; Functional Transfer Techniques; Fall Prevention; Proper Body Mechanics; Compensatory ADL Techniques; Posture/Positioning  Patient's Response to Education: Verbalized Understanding; Returned Demonstration    Equipment used: rw;  Demonstrates functional use    Therapist comments: Patient motivated and participatory. Educated on compensatory techniques and incorporation into ADLs and transfers, followed with good return demo. Performed all ADLs and functional transfers SBA to Mod I, aware of recommendation for initial supervision during shower transfers/showering. Patient reports will have initial supervision from friend at discharge.  Patient reports no further questions or concerns regarding discharge home to ADLs.      Patient End of Session: Up in chair;Needs met;Call light within reach;All patient questions and concerns addressed;SCDs in place;Ice applied;Alarm set    OCCUPATIONAL PROFILE    HOME SITUATION  Type of Home: Apartment (Patient will be staying with friend initially, who lives in a mobile home.)  Home Layout: One level (ground floor)  Lives With: Alone (Patient will initially be staying with a friend post-discharge)    Toilet and Equipment: Comfort height toilet;Grab bar (Patient reported Friend's house has a SHT w/ counter adjacent)  Shower/Tub and Equipment: Tub-shower combo;Grab bar (Patient reported also Tub/Shower Combo with grab bars at Friend's house)  Other Equipment: Other (Comment) (Adjustable Bed at friend's house)             Patient Regularly Uses: Reading glasses    Prior Level of Function: Patient reports independent in all I/ADL and functional mobility with intermittent use of  rollator device prior to admission.     SUBJECTIVE  \"I will have my adjustable bed when I stay at my friends'.\"    PAIN ASSESSMENT  Rating: Other (Comment) (6 at rest; 8 with activity)  Location: R Knee  Management Techniques: Body mechanics;Relaxation;Repositioning;Ice    OBJECTIVE  Precautions: Bed/chair alarm  Fall Risk: High fall risk    WEIGHT BEARING RESTRICTION  Weight Bearing Restriction: R lower extremity        R Lower Extremity: Weight Bearing as Tolerated       AM-PAC ‘6-Clicks’ Inpatient Daily Activity Short Form  -   Putting on and taking off regular lower body clothing?: A Little (supervision)  -   Bathing (including washing, rinsing, drying)?: A Little (supervision)  -   Toileting, which includes using toilet, bedpan or urinal? : A Little (supervision)  -   Putting on and taking off regular upper body clothing?: None  -   Taking care of personal grooming such as brushing teeth?: None  -   Eating meals?: None    AM-PAC Score:  Score: 21  Approx Degree of Impairment: 32.79%  Standardized Score (AM-PAC Scale): 44.27      ADDITIONAL TESTS     NEUROLOGICAL FINDINGS        PLAN   Patient has been evaluated and presents with no skilled Occupational Therapy needs at this time.  Patient discharged from Occupational Therapy services.  Please re-order if a new functional limitation presents during this admission.      Patient Evaluation Complexity Level:   Occupational Profile/Medical History LOW - Brief history including review of medical or therapy records    Specific performance deficits impacting engagement in ADL/IADL LOW  1 - 3 performance deficits    Client Assessment/Performance Deficits LOW - No comorbidities nor modifications of tasks    Clinical Decision Making LOW - Analysis of occupational profile, problem-focused assessments, limited treatment options    Overall Complexity LOW     OT Session Time: 25 minutes  Self-Care Home Management: 14 minutes

## 2024-03-25 ENCOUNTER — APPOINTMENT (OUTPATIENT)
Dept: ULTRASOUND IMAGING | Age: 59
End: 2024-03-25

## 2024-03-25 LAB
ALBUMIN SERPL-MCNC: 3.2 G/DL (ref 3.6–5.1)
ALBUMIN/GLOB SERPL: 0.9 {RATIO} (ref 1–2.4)
ALP SERPL-CCNC: 130 UNITS/L (ref 45–117)
ALT SERPL-CCNC: 71 UNITS/L
ANION GAP SERPL CALC-SCNC: 8 MMOL/L (ref 7–19)
APTT PPP: 34 SEC (ref 22–32)
AST SERPL-CCNC: 52 UNITS/L
BASOPHILS # BLD: 0.1 K/MCL (ref 0–0.3)
BASOPHILS NFR BLD: 1 %
BILIRUB SERPL-MCNC: 1.2 MG/DL (ref 0.2–1)
BUN SERPL-MCNC: 11 MG/DL (ref 6–20)
BUN/CREAT SERPL: 8 (ref 7–25)
CALCIUM SERPL-MCNC: 9.2 MG/DL (ref 8.4–10.2)
CHLORIDE SERPL-SCNC: 102 MMOL/L (ref 97–110)
CO2 SERPL-SCNC: 29 MMOL/L (ref 21–32)
CREAT SERPL-MCNC: 1.33 MG/DL (ref 0.51–0.95)
DEPRECATED RDW RBC: 45 FL (ref 39–50)
EGFRCR SERPLBLD CKD-EPI 2021: 46 ML/MIN/{1.73_M2}
EOSINOPHIL # BLD: 0.2 K/MCL (ref 0–0.5)
EOSINOPHIL NFR BLD: 3 %
ERYTHROCYTE [DISTWIDTH] IN BLOOD: 13.1 % (ref 11–15)
FASTING DURATION TIME PATIENT: ABNORMAL H
GLOBULIN SER-MCNC: 3.4 G/DL (ref 2–4)
GLUCOSE SERPL-MCNC: 118 MG/DL (ref 70–99)
HCT VFR BLD CALC: 33.1 % (ref 36–46.5)
HGB BLD-MCNC: 10.3 G/DL (ref 12–15.5)
IMM GRANULOCYTES # BLD AUTO: 0.1 K/MCL (ref 0–0.2)
IMM GRANULOCYTES # BLD: 2 %
INR PPP: 1.1
LYMPHOCYTES # BLD: 1.5 K/MCL (ref 1–4)
LYMPHOCYTES NFR BLD: 20 %
MCH RBC QN AUTO: 30.6 PG (ref 26–34)
MCHC RBC AUTO-ENTMCNC: 31.1 G/DL (ref 32–36.5)
MCV RBC AUTO: 98.2 FL (ref 78–100)
MONOCYTES # BLD: 0.6 K/MCL (ref 0.3–0.9)
MONOCYTES NFR BLD: 8 %
NEUTROPHILS # BLD: 5 K/MCL (ref 1.8–7.7)
NEUTROPHILS NFR BLD: 66 %
NRBC BLD MANUAL-RTO: 0 /100 WBC
PLATELET # BLD AUTO: 339 K/MCL (ref 140–450)
POTASSIUM SERPL-SCNC: 3 MMOL/L (ref 3.4–5.1)
PROT SERPL-MCNC: 6.6 G/DL (ref 6.4–8.2)
PROTHROMBIN TIME: 11.6 SEC (ref 9.7–11.8)
RBC # BLD: 3.37 MIL/MCL (ref 4–5.2)
SODIUM SERPL-SCNC: 136 MMOL/L (ref 135–145)
WBC # BLD: 7.5 K/MCL (ref 4.2–11)

## 2024-03-25 PROCEDURE — 85025 COMPLETE CBC W/AUTO DIFF WBC: CPT

## 2024-03-25 PROCEDURE — 85610 PROTHROMBIN TIME: CPT

## 2024-03-25 PROCEDURE — 80053 COMPREHEN METABOLIC PANEL: CPT

## 2024-03-25 PROCEDURE — 36415 COLL VENOUS BLD VENIPUNCTURE: CPT

## 2024-03-25 PROCEDURE — 93971 EXTREMITY STUDY: CPT

## 2024-03-25 PROCEDURE — 99284 EMERGENCY DEPT VISIT MOD MDM: CPT

## 2024-03-25 PROCEDURE — 85730 THROMBOPLASTIN TIME PARTIAL: CPT

## 2024-03-26 ENCOUNTER — HOSPITAL ENCOUNTER (EMERGENCY)
Age: 59
Discharge: HOME | End: 2024-03-26
Attending: EMERGENCY MEDICINE

## 2024-03-26 VITALS
OXYGEN SATURATION: 99 % | RESPIRATION RATE: 20 BRPM | HEART RATE: 94 BPM | TEMPERATURE: 97.4 F | DIASTOLIC BLOOD PRESSURE: 88 MMHG | SYSTOLIC BLOOD PRESSURE: 129 MMHG

## 2024-03-26 DIAGNOSIS — S80.11XA HEMATOMA OF RIGHT LOWER EXTREMITY, INITIAL ENCOUNTER: Primary | ICD-10-CM

## 2024-03-26 LAB
RAINBOW EXTRA TUBES HOLD SPECIMEN: NORMAL

## 2024-03-26 PROCEDURE — 10002803 HB RX 637: Performed by: STUDENT IN AN ORGANIZED HEALTH CARE EDUCATION/TRAINING PROGRAM

## 2024-03-26 PROCEDURE — 99284 EMERGENCY DEPT VISIT MOD MDM: CPT | Performed by: EMERGENCY MEDICINE

## 2024-03-26 RX ORDER — HYDROCODONE BITARTRATE AND ACETAMINOPHEN 5; 325 MG/1; MG/1
1 TABLET ORAL ONCE
Status: COMPLETED | OUTPATIENT
Start: 2024-03-26 | End: 2024-03-26

## 2024-03-26 RX ADMIN — HYDROCODONE BITARTRATE AND ACETAMINOPHEN 1 TABLET: 5; 325 TABLET ORAL at 05:45

## 2024-03-26 ASSESSMENT — ENCOUNTER SYMPTOMS
WEAKNESS: 0
CHILLS: 0
ACTIVITY CHANGE: 1
WOUND: 0
FEVER: 0
NUMBNESS: 0
SHORTNESS OF BREATH: 0

## 2024-03-26 ASSESSMENT — PAIN SCALES - GENERAL: PAINLEVEL_OUTOF10: 9

## 2024-04-12 ENCOUNTER — HOSPITAL ENCOUNTER (EMERGENCY)
Facility: HOSPITAL | Age: 59
Discharge: HOME | End: 2024-04-12
Attending: EMERGENCY MEDICINE
Payer: MEDICARE

## 2024-04-12 VITALS
DIASTOLIC BLOOD PRESSURE: 103 MMHG | RESPIRATION RATE: 17 BRPM | WEIGHT: 200 LBS | HEIGHT: 62 IN | TEMPERATURE: 98 F | SYSTOLIC BLOOD PRESSURE: 188 MMHG | OXYGEN SATURATION: 96 % | BODY MASS INDEX: 36.8 KG/M2 | HEART RATE: 87 BPM

## 2024-04-12 DIAGNOSIS — K52.9 GASTROENTERITIS: Primary | ICD-10-CM

## 2024-04-12 LAB
ALBUMIN SERPL-MCNC: 3.3 G/DL (ref 3.4–5)
ALBUMIN/GLOB SERPL: 0.8 {RATIO} (ref 1–2)
ALP LIVER SERPL-CCNC: 110 U/L
ALT SERPL-CCNC: 18 U/L
ANION GAP SERPL CALC-SCNC: 7 MMOL/L (ref 0–18)
AST SERPL-CCNC: 15 U/L (ref 15–37)
BASOPHILS # BLD AUTO: 0.04 X10(3) UL (ref 0–0.2)
BASOPHILS NFR BLD AUTO: 0.5 %
BILIRUB SERPL-MCNC: 0.7 MG/DL (ref 0.1–2)
BUN BLD-MCNC: 8 MG/DL (ref 9–23)
CALCIUM BLD-MCNC: 9.4 MG/DL (ref 8.5–10.1)
CHLORIDE SERPL-SCNC: 106 MMOL/L (ref 98–112)
CO2 SERPL-SCNC: 26 MMOL/L (ref 21–32)
CREAT BLD-MCNC: 0.97 MG/DL
EGFRCR SERPLBLD CKD-EPI 2021: 67 ML/MIN/1.73M2 (ref 60–?)
EOSINOPHIL # BLD AUTO: 0.03 X10(3) UL (ref 0–0.7)
EOSINOPHIL NFR BLD AUTO: 0.4 %
ERYTHROCYTE [DISTWIDTH] IN BLOOD BY AUTOMATED COUNT: 13 %
GLOBULIN PLAS-MCNC: 4 G/DL (ref 2.8–4.4)
GLUCOSE BLD-MCNC: 106 MG/DL (ref 70–99)
HCT VFR BLD AUTO: 38.8 %
HGB BLD-MCNC: 13.4 G/DL
IMM GRANULOCYTES # BLD AUTO: 0.03 X10(3) UL (ref 0–1)
IMM GRANULOCYTES NFR BLD: 0.4 %
LIPASE SERPL-CCNC: 22 U/L (ref 13–75)
LYMPHOCYTES # BLD AUTO: 0.89 X10(3) UL (ref 1–4)
LYMPHOCYTES NFR BLD AUTO: 10.8 %
MCH RBC QN AUTO: 32.2 PG (ref 26–34)
MCHC RBC AUTO-ENTMCNC: 34.5 G/DL (ref 31–37)
MCV RBC AUTO: 93.3 FL
MONOCYTES # BLD AUTO: 0.38 X10(3) UL (ref 0.1–1)
MONOCYTES NFR BLD AUTO: 4.6 %
NEUTROPHILS # BLD AUTO: 6.9 X10 (3) UL (ref 1.5–7.7)
NEUTROPHILS # BLD AUTO: 6.9 X10(3) UL (ref 1.5–7.7)
NEUTROPHILS NFR BLD AUTO: 83.3 %
OSMOLALITY SERPL CALC.SUM OF ELEC: 287 MOSM/KG (ref 275–295)
PLATELET # BLD AUTO: 314 10(3)UL (ref 150–450)
POTASSIUM SERPL-SCNC: 3.4 MMOL/L (ref 3.5–5.1)
PROT SERPL-MCNC: 7.3 G/DL (ref 6.4–8.2)
RBC # BLD AUTO: 4.16 X10(6)UL
SODIUM SERPL-SCNC: 139 MMOL/L (ref 136–145)
WBC # BLD AUTO: 8.3 X10(3) UL (ref 4–11)

## 2024-04-12 PROCEDURE — 96375 TX/PRO/DX INJ NEW DRUG ADDON: CPT

## 2024-04-12 PROCEDURE — 80053 COMPREHEN METABOLIC PANEL: CPT | Performed by: EMERGENCY MEDICINE

## 2024-04-12 PROCEDURE — 83690 ASSAY OF LIPASE: CPT | Performed by: EMERGENCY MEDICINE

## 2024-04-12 PROCEDURE — 80053 COMPREHEN METABOLIC PANEL: CPT

## 2024-04-12 PROCEDURE — 85025 COMPLETE CBC W/AUTO DIFF WBC: CPT | Performed by: EMERGENCY MEDICINE

## 2024-04-12 PROCEDURE — S0028 INJECTION, FAMOTIDINE, 20 MG: HCPCS | Performed by: EMERGENCY MEDICINE

## 2024-04-12 PROCEDURE — 96374 THER/PROPH/DIAG INJ IV PUSH: CPT

## 2024-04-12 PROCEDURE — 99285 EMERGENCY DEPT VISIT HI MDM: CPT

## 2024-04-12 PROCEDURE — 99284 EMERGENCY DEPT VISIT MOD MDM: CPT

## 2024-04-12 PROCEDURE — 96361 HYDRATE IV INFUSION ADD-ON: CPT

## 2024-04-12 PROCEDURE — 85025 COMPLETE CBC W/AUTO DIFF WBC: CPT

## 2024-04-12 RX ORDER — LOSARTAN POTASSIUM 50 MG/1
50 TABLET ORAL ONCE
Status: COMPLETED | OUTPATIENT
Start: 2024-04-12 | End: 2024-04-12

## 2024-04-12 RX ORDER — LOPERAMIDE HYDROCHLORIDE 2 MG/1
2 TABLET ORAL AS NEEDED
Qty: 20 TABLET | Refills: 0 | Status: SHIPPED | OUTPATIENT
Start: 2024-04-12 | End: 2024-05-12

## 2024-04-12 RX ORDER — FAMOTIDINE 10 MG/ML
20 INJECTION, SOLUTION INTRAVENOUS ONCE
Status: COMPLETED | OUTPATIENT
Start: 2024-04-12 | End: 2024-04-12

## 2024-04-12 RX ORDER — DIPHENHYDRAMINE HYDROCHLORIDE 50 MG/ML
25 INJECTION INTRAMUSCULAR; INTRAVENOUS ONCE
Status: COMPLETED | OUTPATIENT
Start: 2024-04-12 | End: 2024-04-12

## 2024-04-12 RX ORDER — METOCLOPRAMIDE 10 MG/1
10 TABLET ORAL 3 TIMES DAILY PRN
Qty: 20 TABLET | Refills: 0 | Status: SHIPPED | OUTPATIENT
Start: 2024-04-12 | End: 2024-05-12

## 2024-04-12 RX ORDER — AMLODIPINE BESYLATE 5 MG/1
5 TABLET ORAL ONCE
Status: COMPLETED | OUTPATIENT
Start: 2024-04-12 | End: 2024-04-12

## 2024-04-12 RX ORDER — METOCLOPRAMIDE HYDROCHLORIDE 5 MG/ML
10 INJECTION INTRAMUSCULAR; INTRAVENOUS ONCE
Status: COMPLETED | OUTPATIENT
Start: 2024-04-12 | End: 2024-04-12

## 2024-04-12 RX ORDER — BENZONATATE 200 MG/1
200 CAPSULE ORAL 3 TIMES DAILY PRN
Qty: 30 CAPSULE | Refills: 0 | Status: SHIPPED | OUTPATIENT
Start: 2024-04-12 | End: 2024-05-12

## 2024-04-12 NOTE — ED INITIAL ASSESSMENT (HPI)
Abdominal pain , nausea and vomiting  since Monday . Seen in ER  in [place]   on Wednesday all investigations came normal  but she is not  able to keep anything down . Feels dizzy  and tired.

## 2024-04-12 NOTE — ED PROVIDER NOTES
Patient Seen in: [facility] Emergency Department      History     Chief Complaint   Patient presents with    Nausea/Vomiting/Diarrhea    Postop/Procedure Problem    Dizziness    Anxiety     Stated Complaint: right knee surgery 4 weeks ago.  + vomiting, intermittent dizziness.  seen in t*    Subjective:   HPI    58-year-old female with a past medical history as below including hypertension, anxiety/depression, fibromyalgia presents with nausea, vomiting and diarrhea starting 4 days ago.  She reports greater than 20 episodes of yellow-colored emesis daily.  Patient daughter states that is mainly dry heaving as she is nothing left in her stomach.  She reports greater than 10 episodes of watery/nonbloody diarrhea.  She reports some pain in her epigastric area which is worse after vomiting.  She states she went to an emergency department in [place] 2 days ago when she had labs and a CAT scan which were reportedly normal.  She states she was prescribed Zofran which has not been helping.  She reports feeling weak and lightheaded along with very anxious.  She denies fever, chills, cough or cold symptoms.  She denies eating any raw or undercooked food.  Denies recent antibiotic use.  Denies recent travel outside the country.    Objective:   Past Medical History:    Acute deep vein thrombosis (DVT) of proximal vein of lower extremity, unspecified laterality (HCC)    Anxiety    Arthritis    Back problem    neck fusion    BARD1 gene mutation positive    BRCA gene positive    Carrier of gene mutation for high risk of cancer    Deep vein thrombosis (HCC)    Left femur 8/2020    Depression    Difficult intubation    Disorder of liver    Fatty Liver    Esophageal reflux    Essential hypertension    Family history of malignant hyperthermia    Fibromyalgia    Fibromyalgia    Gastrointestinal hemorrhage, unspecified gastrointestinal hemorrhage type    High blood pressure    Malignant hyperthermia    SLOW TO AWAKEN    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    Migraines    Neuropathy    Obstructive apnea    DMG PSG AHI 28    Osteoarthritis    PONV (postoperative nausea and vomiting)    Postoperative pain    Sleep apnea    Use  cpap at times    Stress fracture of lower leg, initial encounter    Urge incontinence of urine    Urine incontinence    Visual impairment    readers              Past Surgical History:   Procedure Laterality Date    Arthroscopy of joint unlisted Right     KNEE    Arthroscopy, ankle, surgical; w/ankle arthrodesis      Breast reconstruction Bilateral 10/15/2018    Breast Reconstruction with Bilateral Tissue Expander Placement    Cholecystectomy      Endometrial ablation      Fracture surgery Left     ELBOW    Hysterectomy  2009    laparscopic /ovaries intact     Laparoscopy,diagnostic      Mastectomy left      Mastectomy right      Other Right     KNEE ARTHROSCOPY    Other surgical history  10/2018    bilateral simple mastectomies - Dr. Catie Vargas    Spine surgery procedure unlisted      C3-T1 FUSIONS    Total knee replacement Left 08/03/2020                Social History     Socioeconomic History    Marital status:     Number of children: 1   Occupational History    Occupation: disability   Tobacco Use    Smoking status: Never    Smokeless tobacco: Never   Vaping Use    Vaping status: Never Used   Substance and Sexual Activity    Alcohol use: Not Currently     Comment: very rare    Drug use: Never    Sexual activity: Not Currently     Partners: Male   Other Topics Concern     Service No    Caffeine Concern No    Hobby Hazards No    Stress Concern Yes    Special Diet No    Exercise No    Seat Belt Yes     Social Determinants of Health     Food Insecurity: No Food Insecurity (3/18/2024)    Food Insecurity     Food Insecurity: Never true   Transportation Needs: No Transportation Needs (3/18/2024)    Transportation Needs     Lack of Transportation: No    Received from [facility]  [facility]    Social Connections   Housing Stability: Low Risk  (3/18/2024)    Housing Stability     Housing Instability: No              Review of Systems    Positive for stated complaint: right knee surgery 4 weeks ago.  + vomiting, intermittent dizziness.  seen in t*  Other systems are as noted in HPI.  Constitutional and vital signs reviewed.      All other systems reviewed and negative except as noted above.    Physical Exam     ED Triage Vitals [04/12/24 1310]   /89   Pulse 94   Resp 17   Temp 97.9 °F (36.6 °C)   Temp src Temporal   SpO2 97 %   O2 Device None (Room air)       Current:BP (!) 188/103   Pulse 87   Temp 97.9 °F (36.6 °C) (Temporal)   Resp 17   Ht 157.5 cm (5' 2\")   Wt 90.7 kg   SpO2 96%   BMI 36.58 kg/m²         Physical Exam  Vitals and nursing note reviewed.   Constitutional:       Appearance: She is well-developed.   HENT:      Head: Normocephalic and atraumatic.      Mouth/Throat:      Mouth: Mucous membranes are moist.   Eyes:      General: No scleral icterus.  Cardiovascular:      Rate and Rhythm: Normal rate and regular rhythm.   Pulmonary:      Effort: Pulmonary effort is normal.      Breath sounds: Normal breath sounds.   Abdominal:      General: Bowel sounds are normal. There is no distension.      Palpations: Abdomen is soft.      Tenderness: There is abdominal tenderness. There is no guarding or rebound.      Comments: Epigastric tenderness   Skin:     General: Skin is warm and dry.   Neurological:      General: No focal deficit present.      Mental Status: She is alert and oriented to person, place, and time.      Cranial Nerves: No cranial nerve deficit.      Motor: No weakness.   Psychiatric:         Mood and Affect: Mood is anxious.         Behavior: Behavior normal.               ED Course     Labs Reviewed   COMP METABOLIC PANEL (14) - Abnormal; Notable for the following components:       Result Value    Glucose 106 (*)     Potassium 3.4 (*)     BUN 8  (*)     Albumin 3.3 (*)     A/G Ratio 0.8 (*)     All other components within normal limits   CBC W/ DIFFERENTIAL - Abnormal; Notable for the following components:    Lymphocyte Absolute 0.89 (*)     All other components within normal limits   LIPASE - Normal   CBC WITH DIFFERENTIAL WITH PLATELET    Narrative:     The following orders were created for panel order CBC With Differential With Platelet.  Procedure                               Abnormality         Status                     ---------                               -----------         ------                     CBC W/ DIFFERENTIAL[[id number]]          Abnormal            Final result                 Please view results for these tests on the individual orders.   URINALYSIS WITH CULTURE REFLEX   RAINBOW DRAW LAVENDER   RAINBOW DRAW LIGHT GREEN   RAINBOW DRAW BLUE   GI STOOL PANEL BY PCR   C. DIFFICILE(TOXIGENIC)PCR                      MDM   58-year-old female with a past medical history as below including hypertension, anxiety/depression, fibromyalgia presents with nausea, vomiting and diarrhea starting 4 days ago.      Differential includes but is not limited to viral syndrome, foodborne illness, gastroenteritis, pancreatitis, cholelithiasis dehydration, electrolyte abnormality,    Labs are unremarkable with normal renal function, WBC and hemoglobin.  Potassium is minimally low at 3.4, electrolytes otherwise normal.    Patient was treated with IV fluids, Reglan, Benadryl and Pepcid with significant improvement in symptoms.  She was able to tolerate p.o. fluids without any further vomiting.    Symptoms are likely due to viral gastroenteritis.  Instructed on symptomatic and supportive care.  Will DC home with Rx for Reglan and Imodium.  Instructed to push p.o. fluids and follow-up with PCP in 2 to 3 days.  Return precaution discussed.      Medical Decision Making  Amount and/or Complexity of Data Reviewed  Independent Historian: caregiver     Details:  Daughter, see HPI  Labs: ordered. Decision-making details documented in ED Course.    Risk  Prescription drug management.        Disposition and Plan     Clinical Impression:  1. Gastroenteritis         Disposition:  Discharge  4/12/2024  8:16 pm    Follow-up:  July Valencia MD  [address] [place] [address]  [address] [place] [id number]  [phone]    Follow up            Medications Prescribed:  Current Discharge Medication List        START taking these medications    Details   metoclopramide 10 MG Oral Tab Take 1 tablet (10 mg total) by mouth 3 (three) times daily as needed.  Qty: 20 tablet, Refills: 0      Loperamide HCl 2 MG Oral Tab Take 1 tablet (2 mg total) by mouth as needed for Diarrhea. Take 2 tablets initially.  Take 1 tablet after each unformed stool.  Max 8 tablets/day.  Qty: 20 tablet, Refills: 0      benzonatate 200 MG Oral Cap Take 1 capsule (200 mg total) by mouth 3 (three) times daily as needed for cough.  Qty: 30 capsule, Refills: 0

## (undated) DEVICE — PROXIMATE RH ROTATING HEAD SKIN STAPLERS (35 WIDE) CONTAINS 35 STAINLESS STEEL STAPLES: Brand: PROXIMATE

## (undated) DEVICE — PEN: MARKING STD PT 100/CS: Brand: MEDICAL ACTION INDUSTRIES

## (undated) DEVICE — CAUTERY BLADE 2IN INS E1455

## (undated) DEVICE — ABDOMINAL BINDER: Brand: DEROYAL

## (undated) DEVICE — TOTAL KNEE CDS: Brand: MEDLINE INDUSTRIES, INC.

## (undated) DEVICE — BETADINE SOLUTION 8 OZ BTL

## (undated) DEVICE — PLASTC TOOMEY SYRNG DISP

## (undated) DEVICE — SUPER SPONGES,MEDIUM: Brand: KERLIX

## (undated) DEVICE — 3M™ IOBAN™ 2 ANTIMICROBIAL INCISE DRAPE 6650EZ: Brand: IOBAN™ 2

## (undated) DEVICE — SUTURE ETHILON 3-0 FS-1

## (undated) DEVICE — INTENDED FOR TISSUE SEPARATION, AND OTHER PROCEDURES THAT REQUIRE A SHARP SURGICAL BLADE TO PUNCTURE OR CUT.: Brand: BARD-PARKER ® STAINLESS STEEL BLADES

## (undated) DEVICE — Device: Brand: STABLECUT®

## (undated) DEVICE — UNIVERSAL STERIBUMP® STERILE (5/CASE): Brand: UNIVERSAL STERIBUMP®

## (undated) DEVICE — ADHESIVE SKIN TOP FOR WND CLSR DERMBND ADV

## (undated) DEVICE — COVER STND 54X23IN MAYO REINF

## (undated) DEVICE — SOL  .9 3000ML

## (undated) DEVICE — DISPOSABLE GRASPER: Brand: EPIX LAPAROSCOPIC GRASPER

## (undated) DEVICE — KENDALL SCD EXPRESS SLEEVES, KNEE LENGTH, MEDIUM: Brand: KENDALL SCD

## (undated) DEVICE — Device

## (undated) DEVICE — DEVICE FAT TISSUE COLL REVOLVE

## (undated) DEVICE — 6 ML SYRINGE LUER-LOCK TIP: Brand: MONOJECT

## (undated) DEVICE — DRAPE SHEET LG

## (undated) DEVICE — HOOD: Brand: FLYTE

## (undated) DEVICE — PENCIL ES BTTN SWCH W/ TIP HOLSTER E-Z CLN

## (undated) DEVICE — 1010 S-DRAPE TOWEL DRAPE 10/BX: Brand: STERI-DRAPE™

## (undated) DEVICE — WRAP COOLING KNEE W/ICE PILLOW

## (undated) DEVICE — EYE SPEARS: Brand: WECK-CEL

## (undated) DEVICE — ENCORE® LATEX ACCLAIM SIZE 6.5, STERILE LATEX POWDER-FREE SURGICAL GLOVE: Brand: ENCORE

## (undated) DEVICE — YANKAUER SUCTION INSTRUMENT NO CONTROL VENT, BULB TIP, CLEAR: Brand: YANKAUER

## (undated) DEVICE — CONMED SCOPE SAVER BITE BLOCK, 20X27 MM: Brand: SCOPE SAVER

## (undated) DEVICE — SYRINGE 3ML LL TIP

## (undated) DEVICE — SUTURE MONOCRYL 4-0 PS-2

## (undated) DEVICE — STOPCOCK IV 4 WAY BD

## (undated) DEVICE — 3M™ IOBAN™ 2 ANTIMICROBIAL INCISE DRAPE 6651EZ: Brand: IOBAN™ 2

## (undated) DEVICE — SUTURE PLAIN GUT 5-0 PC-1

## (undated) DEVICE — STANDARD HYPODERMIC NEEDLE,POLYPROPYLENE HUB: Brand: MONOJECT

## (undated) DEVICE — SUT COAT VCRL 2-0 27IN CP-1 ABSRB UD 36MM 1/2

## (undated) DEVICE — LIGACLIP MCA MULTIPLE CLIP APPLIERS, 20 MEDIUM CLIPS: Brand: LIGACLIP

## (undated) DEVICE — 450 ML BOTTLE OF 0.05% CHLORHEXIDINE GLUCONATE IN 99.95% STERILE WATER FOR IRRIGATION, USP AND APPLICATOR.: Brand: IRRISEPT ANTIMICROBIAL WOUND LAVAGE

## (undated) DEVICE — GOWN SUR 2XL LEV 4 BLU W/ WHT V NK BND AERO

## (undated) DEVICE — LIGACLIP MCA MULTIPLE CLIP APPLIERS, 20 SMALL CLIPS: Brand: LIGACLIP

## (undated) DEVICE — ATTUNE PINNING SYSTEM: Brand: ATTUNE

## (undated) DEVICE — SUTURE VICRYL 2-0 CT-1

## (undated) DEVICE — CHLORAPREP 26ML APPLICATOR

## (undated) DEVICE — DRAIN BLAKE ROUND 15FR

## (undated) DEVICE — GOWN,SIRUS,FABRIC-REINFORCED,X-LARGE: Brand: MEDLINE

## (undated) DEVICE — SOL LACT RINGERS 1000ML

## (undated) DEVICE — BANDAGE COHESIVE W4INXL5YD TAN E POR SLF ADH

## (undated) DEVICE — BLADE 11 SHRP BP SS SRG STRL

## (undated) DEVICE — LAMINECTOMY ARM CRADLE FOAM POSITIONER: Brand: CARDINAL HEALTH

## (undated) DEVICE — SYRINGE 50ML LL TIP

## (undated) DEVICE — SOL  .9 1000ML BTL

## (undated) DEVICE — RETRACTOR LONE STAR STAYS BLNT

## (undated) DEVICE — TOWEL,OR,DSP,ST,WHITE,DLX,4/PK,20PK/CS: Brand: MEDLINE

## (undated) DEVICE — 35 ML SYRINGE REGULAR TIP: Brand: MONOJECT

## (undated) DEVICE — TROCAR: Brand: KII® SLEEVE

## (undated) DEVICE — GAMMEX® PI HYBRID SIZE 6.5, STERILE POWDER-FREE SURGICAL GLOVE, POLYISOPRENE AND NEOPRENE BLEND: Brand: GAMMEX

## (undated) DEVICE — SUTURE PROLENE 1 CT-1

## (undated) DEVICE — CATH IV 24G .75IN WNG YLW

## (undated) DEVICE — GLOVE SURG SENSICARE SZ 7-1/2

## (undated) DEVICE — 3M(TM) TEGADERM(TM) TRANSPARENT FILM DRESSING FRAME STYLE 9505W: Brand: 3M™ TEGADERM™

## (undated) DEVICE — DRAIN RESERVOIR RELIAVAC 100CC

## (undated) DEVICE — SUT STRATAFIX SYMMTRC PDS+ 1 18IN CTX ABSRB V

## (undated) DEVICE — CG INFILTRATION TUBING: Brand: CG INFILTRATION TUBING

## (undated) DEVICE — USE ITEM #176901

## (undated) DEVICE — PLASTIC BREAST CDS-LF: Brand: MEDLINE INDUSTRIES, INC.

## (undated) DEVICE — SYRINGE MED 20ML STD CLR PLAS LL TIP N CTRL

## (undated) DEVICE — SPONGE LAP 18X18 XRAY STRL

## (undated) DEVICE — CLIPPER BLADE 3M

## (undated) DEVICE — CONTAINER SPEC STR 4OZ GRY LID

## (undated) DEVICE — SUT VCRL 1 27IN CPX ABSRB UD L48MM 1/2 CIR

## (undated) DEVICE — LAPAROTOMY SPONGE - RF AND X-RAY DETECTABLE PRE-WASHED: Brand: SITUATE

## (undated) DEVICE — WRAP COMPR UNIV KNEE HOT CLD GEL MICWV AND

## (undated) DEVICE — LAP CHOLE: Brand: MEDLINE INDUSTRIES, INC.

## (undated) DEVICE — SLEEVE COMPR MD KNEE LEN SGL USE KENDALL SCD

## (undated) DEVICE — ATTAHJA VAC WITH 22MM CONNECTR

## (undated) DEVICE — STERILE HOOK LOCK LATEX FREE ELASTIC BANDAGE 6INX5YD: Brand: HOOK LOCK™

## (undated) DEVICE — TOWEL OR BLU 16X26 STRL

## (undated) DEVICE — PSI-TEC TUBING: Brand: PSI-TEC TUBING

## (undated) DEVICE — MAJOR GENERAL: Brand: MEDLINE INDUSTRIES, INC.

## (undated) DEVICE — COVER LT HNDL RIG FOR SUR CAM DISP

## (undated) DEVICE — SYRINGE 10ML LL CONTRL SYRINGE

## (undated) DEVICE — PROXIMATE SKIN STAPLERS (35 WIDE) CONTAINS 35 STAINLESS STEEL STAPLES (FIXED HEAD): Brand: PROXIMATE

## (undated) DEVICE — DISPOSABLE LAPAROSCOPIC CLIP APPLIER WITH 20 CLIPS.: Brand: EPIX® UNIVERSAL CLIP APPLIER

## (undated) DEVICE — STERILE POLYISOPRENE POWDER-FREE SURGICAL GLOVES: Brand: PROTEXIS

## (undated) DEVICE — FLEXIBLE YANKAUER,MEDIUM TIP, NO VACUUM CONTROL: Brand: ARGYLE

## (undated) DEVICE — DRAPE 82X48IN MSCP MSCP OPM

## (undated) DEVICE — SUTURE VICRYL 0 J340H

## (undated) DEVICE — PTFE COATED BLADE 4': Brand: MEDLINE

## (undated) DEVICE — OINTMENT BACITRACIN PKT

## (undated) DEVICE — DRAIN RELIAVAC 100CC

## (undated) DEVICE — CRADLE PSTN TLC ARM FM ADJ

## (undated) DEVICE — MICRO CLIP GOLD

## (undated) DEVICE — TRAY CATH BDX 16FR 350ML FL

## (undated) DEVICE — DISPOSABLE TOURNIQUET CUFF SINGLE BLADDER, DUAL PORT AND QUICK CONNECT CONNECTOR: Brand: COLOR CUFF

## (undated) DEVICE — REM POLYHESIVE ADULT PATIENT RETURN ELECTRODE: Brand: VALLEYLAB

## (undated) DEVICE — 3M™ TEGADERM™ TRANSPARENT FILM DRESSING, 1626W, 4 IN X 4-3/4 IN (10 CM X 12 CM), 50 EACH/CARTON, 4 CARTON/CASE: Brand: 3M™ TEGADERM™

## (undated) DEVICE — SUTURE PDS II 4-0 PS-2

## (undated) DEVICE — 3M™ STERI-STRIP™ REINFORCED ADHESIVE SKIN CLOSURES, R1548, 1 IN X 5 IN (25 MM X 125 MM), 4 STRIPS/ENVELOPE: Brand: 3M™ STERI-STRIP™

## (undated) DEVICE — OCCLUSIVE GAUZE STRIP OVERWRAP,3% BISMUTH TRIBROMOPHENATE IN PETROLATUM BLEND: Brand: XEROFORM

## (undated) DEVICE — DERMABOND LIQUID ADHESIVE

## (undated) DEVICE — APPLICATOR PREP 26ML CHG 2% ISO ALC 70%

## (undated) DEVICE — TROCARS: Brand: KII® BALLOON BLUNT TIP SYSTEM

## (undated) DEVICE — POWDER HEMSTAT 3GM OXIDIZED REGENERATED CELOS

## (undated) DEVICE — [HIGH FLOW INSUFFLATOR,  DO NOT USE IF PACKAGE IS DAMAGED,  KEEP DRY,  KEEP AWAY FROM SUNLIGHT,  PROTECT FROM HEAT AND RADIOACTIVE SOURCES.]: Brand: PNEUMOSURE

## (undated) DEVICE — BLAKE SILICONE DRAIN, 15 FR ROUND, HUBLESS WITH 3/16" TROCAR: Brand: BLAKE

## (undated) DEVICE — SUTURE ETHILON 3-0 669H

## (undated) DEVICE — CSTM UNIVERSAL DRAPE PK: Brand: MEDLINE INDUSTRIES, INC.

## (undated) DEVICE — SUTURE VCRL SZ 0 L27IN ABSRB VLT L26MM UR-6

## (undated) DEVICE — BRA ZIPPERED SYTLE 958 X-LG

## (undated) DEVICE — PLUMEPORT ACTIV LAPAROSCOPIC SMOKE FILTRATION DEVICE: Brand: PLUMEPORT ACTIVE

## (undated) DEVICE — TROCAR: Brand: KII FIOS FIRST ENTRY

## (undated) DEVICE — SUTURE VICRYL 3-0 SH

## (undated) DEVICE — DRESSING BIOPATCH 1X4 CNTR

## (undated) DEVICE — 60 ML SYRINGE LUER-LOCK TIP: Brand: MONOJECT

## (undated) DEVICE — SUTURE MCRYL 3-0 27IN ABSRB UD 19MM PS-2 3/8

## (undated) DEVICE — 3M™ STERI-DRAPE™ INSTRUMENT POUCH 1018: Brand: STERI-DRAPE™

## (undated) DEVICE — VIOLET BRAIDED (POLYGLACTIN 910), SYNTHETIC ABSORBABLE SUTURE: Brand: COATED VICRYL

## (undated) DEVICE — ABSORBABLE HEMOSTAT (OXIDIZED REGENERATED CELLULOSE): Brand: SURGICEL

## (undated) DEVICE — SOLUTION IRRIG 3000ML 0.9% NACL FLX CONT

## (undated) DEVICE — DRAPE,U/SHT,SPLIT,FILM,60X84,STERILE: Brand: MEDLINE

## (undated) DEVICE — SPECIMEN CONTAINER,POSITIVE SEAL INDICATOR, OR PACKAGED: Brand: PRECISION

## (undated) DEVICE — SUTURE PROLENE 8-0 BV-130-5

## (undated) DEVICE — BOWL BNE CEM MIX DISP QUIK-VAC

## (undated) DEVICE — LINE MNTR ADLT SET O2 INTMD

## (undated) DEVICE — DRAPE SLUSH/WARMER W/DISC

## (undated) DEVICE — HOOD, PEEL-AWAY: Brand: FLYTE

## (undated) DEVICE — 3M™ IOBAN™ 2 ANTIMICROBIAL INCISE DRAPE 6648EZ: Brand: IOBAN™ 2

## (undated) DEVICE — VIOPTIX DISPOSABLE SENSOR

## (undated) DEVICE — CONVERTORS STOCKINETTE: Brand: CONVERTORS

## (undated) DEVICE — ZIMMER® STERILE DISPOSABLE TOURNIQUET CUFF WITH PLC, DUAL PORT, SINGLE BLADDER, 34 IN. (86 CM)

## (undated) DEVICE — SOLUTION IRRIG 1000ML 0.9% NACL USP BTL

## (undated) DEVICE — MEDI-VAC NON-CONDUCTIVE SUCTION TUBING: Brand: CARDINAL HEALTH

## (undated) DEVICE — DRESSING FOAM TOPIFOAM

## (undated) DEVICE — VIAL LABORATORY SPY

## (undated) DEVICE — LIGHT HANDLE

## (undated) DEVICE — GAMMEX® PI HYBRID SIZE 7.5, STERILE POWDER-FREE SURGICAL GLOVE, POLYISOPRENE AND NEOPRENE BLEND: Brand: GAMMEX

## (undated) DEVICE — 9534HP TRANSPARENT DRSG W/FRAME: Brand: 3M™ TEGADERM™

## (undated) DEVICE — WIPE WITH WICK AND CORNEAL SHIELD: Brand: MEROCEL

## (undated) DEVICE — SUTURE PROLENE 4-0 RB-1

## (undated) DEVICE — SUTURE PDS II 2-0 CT-2

## (undated) DEVICE — DECANTER BAG 9": Brand: MEDLINE INDUSTRIES, INC.

## (undated) DEVICE — PAD KNEE POS PROTCT MEM GEL FOAM BOOT AND

## (undated) DEVICE — CONTAINER,SPECIMEN,PNEU TUBE,4OZ,OR STRL: Brand: MEDLINE

## (undated) DEVICE — SUCTION SARNS MICRO SUCKER

## (undated) DEVICE — SUCTION CANISTER, 3000CC,SAFELINER: Brand: DEROYAL

## (undated) DEVICE — SUTURE VICRYL 2-0 FSL

## (undated) DEVICE — 3M™ BAIR HUGGER® UNDERBODY BLANKET, FULL ACCESS, 10 PER CASE 63500: Brand: BAIR HUGGER™

## (undated) DEVICE — UNDYED BRAIDED (POLYGLACTIN 910), SYNTHETIC ABSORBABLE SUTURE: Brand: COATED VICRYL

## (undated) DEVICE — 3 ML SYRINGE LUER-LOCK TIP: Brand: MONOJECT

## (undated) DEVICE — Device: Brand: POWER-FLO®

## (undated) DEVICE — FORCEP RADIAL JAW 4

## (undated) DEVICE — BANDAGE ROLL,100% COTTON, 6 PLY, LARGE: Brand: KERLIX

## (undated) DEVICE — APPLICATOR ENDOSCP FOR ADJUNCTIVE HEMSTAS

## (undated) DEVICE — GOWN SURG AERO BLUE PERF XLG

## (undated) DEVICE — GEL AQUASONIC 100 20GR

## (undated) DEVICE — VEST SRG 5 XL CUP R/L

## (undated) DEVICE — GLOVE BIOGEL M SURG SZ 6-1/2

## (undated) DEVICE — BREAST-HERNIA-PORT CDS-LF: Brand: MEDLINE INDUSTRIES, INC.

## (undated) DEVICE — SUTURE SILK 2-0 FSL

## (undated) DEVICE — Device: Brand: CUSTOM PROCEDURE KIT

## (undated) DEVICE — LIGACLIP MCA MULTIPLE CLIP APPLIERS, 30 MEDIUM CLIPS: Brand: LIGACLIP

## (undated) DEVICE — SUTURE PROLENE 4-0 PS-2

## (undated) DEVICE — GLOVE SUR 7.5 SENSICARE PI PIP CRM PWD F

## (undated) DEVICE — CLIP HMST 30 CRTDG SPRFN BLU

## (undated) DEVICE — TISSUE RETRIEVAL SYSTEM: Brand: INZII RETRIEVAL SYSTEM

## (undated) DEVICE — PENCIL ESURG 10FT 3/32IN SS

## (undated) DEVICE — STOCKINETTE,IMPERVIOUS,12X48,STERILE: Brand: MEDLINE

## (undated) DEVICE — Device: Brand: PLUMEPEN

## (undated) DEVICE — Device: Brand: DEFENDO AIR/WATER/SUCTION AND BIOPSY VALVE

## (undated) DEVICE — SUTURE ETHIBOND 1 OS-6

## (undated) DEVICE — 2T11 #2 PDO 36 X 36: Brand: 2T11 #2 PDO 36 X 36

## (undated) DEVICE — CASED DISP BIPOLAR CORD

## (undated) NOTE — LETTER
[facility]  [address] [facility], [address] [place] [address]:  [phone]  FAX:  [phone]    10/26/18    Patient: Mercy Copeland  : 10/18/1965     Please allow

## (undated) NOTE — LETTER
9/3/2020          [address] [place] [address],[address] [place] [id number]    Dear Stevan Palomo,       Here are the biopsy/pathology findings from your recent EGD (Upper  Endoscopy): negative for H pylori baceria.       If you need any further assistance, please

## (undated) NOTE — LETTER
[address] [place] [address] Rd, [facility], [place]     AUTHORIZATION FOR SURGICAL OPERATION OR PROCEDURE    I hereby authorize Dr. Adolfo Hahn MD, and Dr. Jennifer Nguyen. MD my Physician(s) and whomever may be designated as the [employer] 4. I consent to the photographing of procedure(s) to be performed for the purposes of advancing medicine, science and/or education, provided my identity is not revealed.  If the procedure has been videotaped, the physician/surgeon will obtain the original v (Witness signature)                                                                                                  (Date)                                (Time)  STATEMENT OF PHYSICIAN My signature below affirms that prior to the time of the procedure;  I

## (undated) NOTE — LETTER
[address], [place]  Authorization for Invasive Procedures  1.  I hereby authorize Dr. Zuleika Cano , my physician and whomever may be designated as the doctor's assistant, to perform the following operation and/or procedure:  Abdomi performed for the purposes of advancing medicine, science, and/or education, provided my identity is not revealed. If the procedure has been videotaped, the physician/surgeon will obtain the original videotape.  The hospital will not be responsible for stor My signature below affirms that prior to the time of the procedure, I have explained to the patient and/or her legal representative, the risks and benefits involved in the proposed treatment and any reasonable alternative to the proposed treatment.  I have

## (undated) NOTE — LETTER
[address], [place]  Authorization for Invasive Procedures  1.  I hereby authorize Dr. Lynne Khan , my physician and whomever may be designated as the doctor's assistant, to perform the following operation and/or procedure:  Seroma performed for the purposes of advancing medicine, science, and/or education, provided my identity is not revealed. If the procedure has been videotaped, the physician/surgeon will obtain the original videotape.  The hospital will not be responsible for stor My signature below affirms that prior to the time of the procedure, I have explained to the patient and/or her legal representative, the risks and benefits involved in the proposed treatment and any reasonable alternative to the proposed treatment.  I have

## (undated) NOTE — ED AVS SNAPSHOT
Karan Millan   MRN: [id number]    Department:  [facility] Emergency Department   Date of Visit:  10/14/2019           Disclosure     Insurance plans vary and the physician(s) referred by the ER may not be covered by your plan.  Please contact your i tell this physician (or your personal doctor if your instructions are to return to your personal doctor) about any new or lasting problems. The primary care or specialist physician will see patients referred from the [facility] Emergency Department.  Kade Leahy

## (undated) NOTE — LETTER
[facility] Testing Department  Phone: [phone]  OUTSIDE TESTING RESULT REQUEST      TO:  DR Mario Murillo Date: 10/9/18    FAX #: [phone]     IMPORTANT: FOR YOUR IMMEDIATE ATTENTION  Please FAX all test results listed below to: 6

## (undated) NOTE — ED AVS SNAPSHOT
Alana Calix   MRN: [id number]    Department:  [facility] Emergency Department   Date of Visit:  7/25/2019           Disclosure     Insurance plans vary and the physician(s) referred by the ER may not be covered by your plan.  Please contact your within the next three months to obtain basic health screening including reassessment of your blood pressure.     IF THERE IS ANY CHANGE OR WORSENING OF YOUR CONDITION, CALL YOUR PRIMARY CARE PHYSICIAN AT ONCE OR RETURN IMMEDIATELY TO THE EMERGENCY DEPARTMEN

## (undated) NOTE — IP AVS SNAPSHOT
Patient Demographics     Address  [address] [place] [address] 12  [facility] [address] Phone  [phone] [facility]  [phone] [facility] E-mail Address  [email or web address]      Emergency Contact(s)     Name Relation Home Work Mobile    Arlene,Edwin OTHER   193- o Remove entire wrapping and old dressing (if Gauze) after showering. Pat dry if necessary WITH A CLEAN TOWEL and cover incision with dry sterile gauze and paper tape. For other types of dressings, follow surgeon’s orders.                            GAUZE Medication  Anticoagulants = blood thinners (Xarelto, Eliquis, Lovenox, Coumadin, or Aspirin)  ? Pill or shot form depending on what your physician orders. ? IF placed on Coumadin, you may also need lab work done for monitoring. ?  You will bleed easier ? Have realistic goals and keep a positive outlook. ? Deep breathing and relaxation techniques and distractions can help! If you focus on something else, you do not experience the pain the same.  Take advantage of everything available to your to help cont surgery and that stress can affect all your other health issues (such as high blood pressure, diabetes, CHF, afib, and asthma just to name a few).   We don’t want those other health issues to cause you to get readmitted to the hospital; much better for you ? TEMPORARY HANDICAP PARKING APPLICATION  (good for 3-6 months)  – At Surgeon or PCP visit, request they fill out the form, then go to [facility] (only time you do not wait in a long line there). Some [facility] offices provide the same service.  (Louise Whitaker Commonly known as:  NEURONTIN  Next dose due:   Today at 4 pm      TAKE 1 CAPSULE BY MOUTH THREE TIMES DAILY AND 2 CAPSULES NIGHTLY AS DIRECTED   Maggy Araujo MD         hydrochlorothiazide 12.5 MG Tabs  Commonly known as:  HYDRODIURIL  Next dose due: 585315220 acetaminophen (TYLENOL EXTRA STRENGTH) tab 1,000 mg 08/03/20 1741 Given      [id number] acetaminophen (TYLENOL EXTRA STRENGTH) tab 1,000 mg 08/03/20 2031 Given      [id number] acetaminophen (TYLENOL EXTRA STRENGTH) tab 1,000 mg 08/04/20 0851 Given [id number] oxyCODONE HCl (OXY-IR) cap/tab 10 mg 08/04/20 0211 Given      [id number] oxyCODONE HCl (OXY-IR) cap/tab 10 mg 08/04/20 0616 Given      [id number] oxyCODONE HCl (OXY-IR) cap/tab 10 mg 08/04/20 1105 Given              Recent Vital Signs       Most R for anxiety, fibromyalgia, HTN  who presents sp TKA. They tolerated the procedure well without any immediate complications. Specifically, they deny N/V, lightheadness, chest pain, SOB, cough or fever.   Their pain is currently controlled on current regim • LAPAROSCOPY,DIAGNOSTIC     • MASTECTOMY LEFT     • MASTECTOMY RIGHT     • MICRO FLAP Bilateral 5/20/2019    Performed by Clyde Fleischer, MD at [address] [place] [address] OR   • OTHER Right     KNEE ARTHROSCOPY   • OTHER SURGICAL HISTORY  10/2018    bilateral simple mastect Neck: Supple, symmetrical, trachea midline, no cervical or supraclavicular lymph adenopathy, thyroid: no enlargment/tenderness/nodules appreciated   Lungs:   Clear to auscultation bilaterally. Normal effort   Chest wall:  No tenderness or deformity.    [facility] Physical Therapy Note signed by Asia Cotton PT at 8/3/2020  3:12 PM  Version 1 of 1    Author:  Asia Cotton PT Service:  Rehab Author Type:  Physical Therapist    Filed:  8/3/2020  3:12 PM Date of Service:  8/3/2020  3:03 PM Status:  Signed Performed by Steffen Louise MD at [facility] MAIN OR   • ENDOMETRIAL ABLATION     • FAT GRAFTING Bilateral 11/14/2019    Performed by Oswaldo Harrell MD at [address] [place] [address] MAIN OR   • FRACTURE SURGERY Left     ELBOW   • HYSTERECTOMY  2009    laparscopic Mago Aripeka intact    • UE limited ROM and strength due to prior cervical/thoracic surgery    Lower extremity ROM is within functional limits R LE WNL, L knee ROM limited    Lower extremity strength is within functional limits R LE WNL, L LE pt able to perform quad set and SLR sitting EOB. Pt instructed in proper mechanics and hand placement with RW, but pt insisted on \"my way, I have done it for years', which is to put both elbows and hands on the RW to push up from the bed. Min A for sit<>stand.  Pt educated in gait sequencing may benefit from DC to home with HHPT and intermittent supervision from friend. Based on this evaluation, patient's clinical presentation is stable and overall the evaluation complexity is considered low.   These impairments and comorbidities manifest th PT orders received, RN approved PT session, Attempted to see pt for PT however, pt continues to have numbness in the distal medial aspect of leg, unable to perform quad set or SLR. Will attempt to see pt later. CM[CM.1]     Attribution Key    CM. 1 - Umer Woodward

## (undated) NOTE — LETTER
[address] [place] [address]  Authorization for Invasive Procedures  1.  I hereby authorize  Dr. Kristen Serra / Milagro Issa , my physician and whomever may be designated as the doctor's assistant, to perform the following operation and/or procedure: performed for the purposes of advancing medicine, science, and/or education, provided my identity is not revealed. If the procedure has been videotaped, the physician/surgeon will obtain the original videotape.  The hospital will not be responsible for stor My signature below affirms that prior to the time of the procedure, I have explained to the patient and/or her legal representative, the risks and benefits involved in the proposed treatment and any reasonable alternative to the proposed treatment.  I have

## (undated) NOTE — LETTER
[facility], [place]  Authorization for Invasive Procedures  1.  I hereby authorize Dr. Roseann Ngo , my physician and whomever may be designated as the doctor's assistant, to perform the following operation and/or procedure:  Yulies performed for the purposes of advancing medicine, science, and/or education, provided my identity is not revealed. If the procedure has been videotaped, the physician/surgeon will obtain the original videotape.  The hospital will not be responsible for stor My signature below affirms that prior to the time of the procedure, I have explained to the patient and/or her legal representative, the risks and benefits involved in the proposed treatment and any reasonable alternative to the proposed treatment.  I have

## (undated) NOTE — LETTER
[address], [place]  Authorization for Invasive Procedures  1.  I hereby authorize  Dr. Ivory Damon  my physician and whomever may be designated as the doctor's assistant, to perform the following operation and/or procedure Abscess Drain performed for the purposes of advancing medicine, science, and/or education, provided my identity is not revealed. If the procedure has been videotaped, the physician/surgeon will obtain the original videotape.  The hospital will not be responsible for stor My signature below affirms that prior to the time of the procedure, I have explained to the patient and/or her legal representative, the risks and benefits involved in the proposed treatment and any reasonable alternative to the proposed treatment.  I have